# Patient Record
Sex: FEMALE | Race: BLACK OR AFRICAN AMERICAN | NOT HISPANIC OR LATINO | Employment: OTHER | URBAN - METROPOLITAN AREA
[De-identification: names, ages, dates, MRNs, and addresses within clinical notes are randomized per-mention and may not be internally consistent; named-entity substitution may affect disease eponyms.]

---

## 2021-06-21 ENCOUNTER — OFFICE VISIT (OUTPATIENT)
Dept: INTERNAL MEDICINE | Facility: CLINIC | Age: 71
End: 2021-06-21
Payer: COMMERCIAL

## 2021-06-21 VITALS
HEIGHT: 67 IN | BODY MASS INDEX: 23.39 KG/M2 | RESPIRATION RATE: 24 BRPM | DIASTOLIC BLOOD PRESSURE: 116 MMHG | HEART RATE: 80 BPM | OXYGEN SATURATION: 100 % | WEIGHT: 149 LBS | TEMPERATURE: 98.4 F | SYSTOLIC BLOOD PRESSURE: 197 MMHG

## 2021-06-21 DIAGNOSIS — K62.5 BRIGHT RED BLOOD PER RECTUM: ICD-10-CM

## 2021-06-21 DIAGNOSIS — H54.7 DECREASED VISION: ICD-10-CM

## 2021-06-21 DIAGNOSIS — Z12.31 ENCOUNTER FOR SCREENING MAMMOGRAM FOR MALIGNANT NEOPLASM OF BREAST: ICD-10-CM

## 2021-06-21 DIAGNOSIS — Z00.00 PREVENTATIVE HEALTH CARE: Primary | ICD-10-CM

## 2021-06-21 DIAGNOSIS — I10 ESSENTIAL HYPERTENSION: ICD-10-CM

## 2021-06-21 DIAGNOSIS — Z12.11 SPECIAL SCREENING FOR MALIGNANT NEOPLASMS, COLON: ICD-10-CM

## 2021-06-21 LAB
BASOPHILS # BLD AUTO: 0 10E9/L (ref 0–0.2)
BASOPHILS NFR BLD AUTO: 0.3 %
DIFFERENTIAL METHOD BLD: ABNORMAL
EOSINOPHIL # BLD AUTO: 0.1 10E9/L (ref 0–0.7)
EOSINOPHIL NFR BLD AUTO: 1.3 %
ERYTHROCYTE [DISTWIDTH] IN BLOOD BY AUTOMATED COUNT: 16 % (ref 10–15)
HCT VFR BLD AUTO: 38.4 % (ref 35–47)
HGB BLD-MCNC: 12.1 G/DL (ref 11.7–15.7)
LYMPHOCYTES # BLD AUTO: 2.7 10E9/L (ref 0.8–5.3)
LYMPHOCYTES NFR BLD AUTO: 34.4 %
MCH RBC QN AUTO: 26.4 PG (ref 26.5–33)
MCHC RBC AUTO-ENTMCNC: 31.5 G/DL (ref 31.5–36.5)
MCV RBC AUTO: 84 FL (ref 78–100)
MONOCYTES # BLD AUTO: 0.7 10E9/L (ref 0–1.3)
MONOCYTES NFR BLD AUTO: 9.4 %
NEUTROPHILS # BLD AUTO: 4.2 10E9/L (ref 1.6–8.3)
NEUTROPHILS NFR BLD AUTO: 54.6 %
PLATELET # BLD AUTO: 243 10E9/L (ref 150–450)
RBC # BLD AUTO: 4.59 10E12/L (ref 3.8–5.2)
WBC # BLD AUTO: 7.7 10E9/L (ref 4–11)

## 2021-06-21 PROCEDURE — 84443 ASSAY THYROID STIM HORMONE: CPT | Performed by: INTERNAL MEDICINE

## 2021-06-21 PROCEDURE — 80053 COMPREHEN METABOLIC PANEL: CPT | Performed by: INTERNAL MEDICINE

## 2021-06-21 PROCEDURE — 85025 COMPLETE CBC W/AUTO DIFF WBC: CPT | Performed by: INTERNAL MEDICINE

## 2021-06-21 PROCEDURE — 36415 COLL VENOUS BLD VENIPUNCTURE: CPT | Performed by: INTERNAL MEDICINE

## 2021-06-21 PROCEDURE — G0438 PPPS, INITIAL VISIT: HCPCS | Performed by: INTERNAL MEDICINE

## 2021-06-21 PROCEDURE — 80061 LIPID PANEL: CPT | Performed by: INTERNAL MEDICINE

## 2021-06-21 RX ORDER — LISINOPRIL 10 MG/1
10 TABLET ORAL DAILY
Qty: 90 TABLET | Refills: 1 | Status: ON HOLD | OUTPATIENT
Start: 2021-06-21 | End: 2022-01-19

## 2021-06-21 SDOH — HEALTH STABILITY: MENTAL HEALTH: HOW OFTEN DO YOU HAVE A DRINK CONTAINING ALCOHOL?: NOT ASKED

## 2021-06-21 SDOH — HEALTH STABILITY: MENTAL HEALTH: HOW OFTEN DO YOU HAVE 6 OR MORE DRINKS ON ONE OCCASION?: NOT ASKED

## 2021-06-21 SDOH — HEALTH STABILITY: MENTAL HEALTH: HOW MANY STANDARD DRINKS CONTAINING ALCOHOL DO YOU HAVE ON A TYPICAL DAY?: NOT ASKED

## 2021-06-21 ASSESSMENT — ENCOUNTER SYMPTOMS
CONSTIPATION: 0
PALPITATIONS: 0
SORE THROAT: 0
NERVOUS/ANXIOUS: 0
DIZZINESS: 0
HEMATURIA: 0
JOINT SWELLING: 0
FEVER: 0
HEADACHES: 0
DIARRHEA: 0
SHORTNESS OF BREATH: 0
PARESTHESIAS: 1
NAUSEA: 0
MYALGIAS: 0
HEARTBURN: 0
DYSURIA: 0
EYE PAIN: 0
BREAST MASS: 0
WEAKNESS: 0
FREQUENCY: 0
HEMATOCHEZIA: 1
ARTHRALGIAS: 0
CHILLS: 0
ABDOMINAL PAIN: 0
COUGH: 0

## 2021-06-21 ASSESSMENT — ACTIVITIES OF DAILY LIVING (ADL): CURRENT_FUNCTION: TRANSPORTATION REQUIRES ASSISTANCE

## 2021-06-21 ASSESSMENT — MIFFLIN-ST. JEOR: SCORE: 1210.55

## 2021-06-21 NOTE — LETTER
June 22, 2021      Rolanda Molina  [id number]   [facility] [address]        Dear ,    We are writing to inform you of your test results.    Labs are within acceptable limits.    Resulted Orders   CBC with platelets and differential   Result Value Ref Range    WBC 7.7 4.0 - 11.0 10e9/L    RBC Count 4.59 3.8 - 5.2 10e12/L    Hemoglobin 12.1 11.7 - 15.7 g/dL    Hematocrit 38.4 35.0 - 47.0 %    MCV 84 78 - 100 fl    MCH 26.4 (L) 26.5 - 33.0 pg    MCHC 31.5 31.5 - 36.5 g/dL    RDW 16.0 (H) 10.0 - 15.0 %    Platelet Count 243 150 - 450 10e9/L    % Neutrophils 54.6 %    % Lymphocytes 34.4 %    % Monocytes 9.4 %    % Eosinophils 1.3 %    % Basophils 0.3 %    Absolute Neutrophil 4.2 1.6 - 8.3 10e9/L    Absolute Lymphocytes 2.7 0.8 - 5.3 10e9/L    Absolute Monocytes 0.7 0.0 - 1.3 10e9/L    Absolute Eosinophils 0.1 0.0 - 0.7 10e9/L    Absolute Basophils 0.0 0.0 - 0.2 10e9/L    Diff Method Automated Method    TSH with free T4 reflex   Result Value Ref Range    TSH 3.98 0.40 - 4.00 mU/L   Comprehensive metabolic panel (BMP + Alb, Alk Phos, ALT, AST, Total. Bili, TP)   Result Value Ref Range    Sodium 141 133 - 144 mmol/L    Potassium 3.5 3.4 - 5.3 mmol/L    Chloride 109 94 - 109 mmol/L    Carbon Dioxide 24 20 - 32 mmol/L    Anion Gap 8 3 - 14 mmol/L    Glucose 97 70 - 99 mg/dL      Comment:      Fasting specimen    Urea Nitrogen 12 7 - 30 mg/dL    Creatinine 0.94 0.52 - 1.04 mg/dL    GFR Estimate 60 (L) >60 mL/min/[1.73_m2]      Comment:      Non  GFR Calc  Starting 12/18/2018, serum creatinine based estimated GFR (eGFR) will be   calculated using the Chronic Kidney Disease Epidemiology Collaboration   (CKD-EPI) equation.      GFR Estimate If Black 69 >60 mL/min/[1.73_m2]      Comment:       GFR Calc  Starting 12/18/2018, serum creatinine based estimated GFR (eGFR) will be   calculated using the Chronic Kidney Disease Epidemiology Collaboration   (CKD-EPI) equation.       Calcium 9.0 8.5 - 10.1 mg/dL    Bilirubin Total 0.4 0.2 - 1.3 mg/dL    Albumin 3.8 3.4 - 5.0 g/dL    Protein Total 7.8 6.8 - 8.8 g/dL    Alkaline Phosphatase 99 40 - 150 U/L    ALT 18 0 - 50 U/L    AST 19 0 - 45 U/L   Lipid Profile (Chol, Trig, HDL, LDL calc)   Result Value Ref Range    Cholesterol 199 <200 mg/dL    Triglycerides 157 (H) <150 mg/dL      Comment:      Borderline high:  150-199 mg/dl  High:             200-499 mg/dl  Very high:       >499 mg/dl  Fasting specimen      HDL Cholesterol 71 >49 mg/dL    LDL Cholesterol Calculated 97 <100 mg/dL      Comment:      Desirable:       <100 mg/dl    Non HDL Cholesterol 128 <130 mg/dL       If you have any questions or concerns, please call the clinic at the number listed above.       Sincerely,      Ira Leong MD

## 2021-06-21 NOTE — PROGRESS NOTES
"SUBJECTIVE:   Rolanda Molina is a 70 year old female who presents for Preventive Visit.    Establish care. Fasting.    Patient has been advised of split billing requirements and indicates understanding: Yes   Are you in the first 12 months of your Medicare coverage?  No    Healthy Habits:     In general, how would you rate your overall health?  Good    Frequency of exercise:  None    Do you usually eat at least 4 servings of fruit and vegetables a day, include whole grains    & fiber and avoid regularly eating high fat or \"junk\" foods?  Yes    Taking medications regularly:  Not Applicable    Medication side effects:  Not applicable    Ability to successfully perform activities of daily living:  Transportation requires assistance    Home Safety:  No safety concerns identified    Hearing Impairment:  Difficulty following a conversation in a noisy restaurant or crowded room, feel that people are mumbling or not speaking clearly and need to ask people to speak up or repeat themselves    In the past 6 months, have you been bothered by leaking of urine? Yes    In general, how would you rate your overall mental or emotional health?  Good      PHQ-2 Total Score: 0    Additional concerns today:  No    Do you feel safe in your environment? Yes    Have you ever done Advance Care Planning? (For example, a Health Directive, POLST, or a discussion with a medical provider or your loved ones about your wishes): No, advance care planning information given to patient to review.  Patient declined advance care planning discussion at this time.       Fall risk  Fallen 2 or more times in the past year?: No  Any fall with injury in the past year?: No    Cognitive Screening   1) Repeat 3 items (Leader, Season, Table)    2) Clock draw: NORMAL  3) 3 item recall: Recalls 1 object   Results: NORMAL clock, 1-2 items recalled: COGNITIVE IMPAIRMENT LESS LIKELY    Mini-CogTM Copyright [employer]OW[employer]. Licensed by the author for use in [facility] " Services; reprinted with permission (soob@.[facility]). All rights reserved.      Do you have sleep apnea, excessive snoring or daytime drowsiness?: no    Reviewed and updated as needed this visit by clinical staff                 Reviewed and updated as needed this visit by Provider                Social History     Tobacco Use     Smoking status: Not on file   Substance Use Topics     Alcohol use: Not on file         No flowsheet data found.            Current providers sharing in care for this patient include:   Patient Care Team:  No Ref-Primary, Physician as PCP - General    The following health maintenance items are reviewed in Epic and correct as of today:  There are no preventive care reminders to display for this patient.  BP Readings from Last 3 Encounters:   06/21/21 (!) 197/116    Wt Readings from Last 3 Encounters:   06/21/21 67.6 kg (149 lb)                     Review of Systems   Constitutional: Negative for chills and fever.   HENT: Positive for hearing loss. Negative for congestion, ear pain and sore throat.    Eyes: Positive for visual disturbance. Negative for pain.   Respiratory: Negative for cough and shortness of breath.    Cardiovascular: Negative for chest pain, palpitations and peripheral edema.   Gastrointestinal: Positive for hematochezia. Negative for abdominal pain, constipation, diarrhea, heartburn and nausea.   Breasts:  Negative for tenderness, breast mass and discharge.   Genitourinary: Negative for dysuria, frequency, genital sores, hematuria, pelvic pain, urgency, vaginal bleeding and vaginal discharge.   Musculoskeletal: Negative for arthralgias, joint swelling and myalgias.   Skin: Negative for rash.   Neurological: Positive for paresthesias. Negative for dizziness, weakness and headaches.   Psychiatric/Behavioral: Negative for mood changes. The patient is not nervous/anxious.      Has been 15 years since she has seen an MD. Has never had a mammogram or colonoscopy. Has had a Tdap in  the last 20 years refuses any kind of vaccination today.     Has occasional bright red blood per rectum when she has a BM on the paper when she wipes.     OBJECTIVE:   There were no vitals taken for this visit. There is no height or weight on file to calculate BMI.  Physical Exam  GENERAL: healthy, alert and no distress  EYES: Eyes grossly normal to inspection, PERRL and conjunctivae and sclerae normal  NECK: no adenopathy, no asymmetry, masses, or scars and thyroid normal to palpation  RESP: lungs clear to auscultation - no rales, rhonchi or wheezes  CV: regular rate and rhythm, normal S1 S2, no S3 or S4, no murmur, click or rub, no peripheral edema and peripheral pulses strong  ABDOMEN: soft, nontender, no hepatosplenomegaly, no masses and bowel sounds normal  MS: no gross musculoskeletal defects noted, no edema  SKIN: no suspicious lesions or rashes  NEURO: Normal strength and tone, mentation intact and speech normal  PSYCH: mentation appears normal, affect normal/bright      ASSESSMENT / PLAN:   1. Preventative health care     - *MA Screening Digital Bilateral; Future    2. Essential hypertension  Will start Lisinopril and get screening labs  - lisinopril (ZESTRIL) 10 MG tablet; Take 1 tablet (10 mg) by mouth daily  Dispense: 90 tablet; Refill: 1  - CBC with platelets and differential  - TSH with free T4 reflex  - Comprehensive metabolic panel (BMP + Alb, Alk Phos, ALT, AST, Total. Bili, TP)  - Lipid Profile (Chol, Trig, HDL, LDL calc)    3. Bright red blood per rectum  Unlikely to be anything major but she did agree to colonoscopy     4. Special screening for malignant neoplasms, colon     - GASTROENTEROLOGY ADULT REF PROCEDURE ONLY; Future    5. Decreased vision  Will refer to   - EYE ADULT REFERRAL; Future  - EYE ADULT REFERRAL; Future    6. Encounter for screening mammogram for malignant neoplasm of breast   Due for  - *MA Screening Digital Bilateral; Future    Patient has been advised of split billing  requirements and indicates understanding: Yes  COUNSELING:  Reviewed preventive health counseling, as reflected in patient instructions       Regular exercise       Healthy diet/nutrition    There is no height or weight on file to calculate BMI.        She has no history on file for tobacco.      Appropriate preventive services were discussed with this patient, including applicable screening as appropriate for cardiovascular disease, diabetes, osteopenia/osteoporosis, and glaucoma.  As appropriate for age/gender, discussed screening for colorectal cancer, prostate cancer, breast cancer, and cervical cancer. Checklist reviewing preventive services available has been given to the patient.    Reviewed patients plan of care and provided an AVS. The Basic Care Plan (routine screening as documented in Health Maintenance) for Rolanda meets the Care Plan requirement. This Care Plan has been established and reviewed with the Patient.    Counseling Resources:  ATP IV Guidelines  Pooled Cohorts Equation Calculator  Breast Cancer Risk Calculator  Breast Cancer: Medication to Reduce Risk  FRAX Risk Assessment  ICSI Preventive Guidelines  Dietary Guidelines for Americans, 2010  USDA's MyPlate  ASA Prophylaxis  Lung CA Screening    Ira Leong MD  [facility]    Identified Health Risks:

## 2021-06-22 LAB
ALBUMIN SERPL-MCNC: 3.8 G/DL (ref 3.4–5)
ALP SERPL-CCNC: 99 U/L (ref 40–150)
ALT SERPL W P-5'-P-CCNC: 18 U/L (ref 0–50)
ANION GAP SERPL CALCULATED.3IONS-SCNC: 8 MMOL/L (ref 3–14)
AST SERPL W P-5'-P-CCNC: 19 U/L (ref 0–45)
BILIRUB SERPL-MCNC: 0.4 MG/DL (ref 0.2–1.3)
BUN SERPL-MCNC: 12 MG/DL (ref 7–30)
CALCIUM SERPL-MCNC: 9 MG/DL (ref 8.5–10.1)
CHLORIDE SERPL-SCNC: 109 MMOL/L (ref 94–109)
CHOLEST SERPL-MCNC: 199 MG/DL
CO2 SERPL-SCNC: 24 MMOL/L (ref 20–32)
CREAT SERPL-MCNC: 0.94 MG/DL (ref 0.52–1.04)
GFR SERPL CREATININE-BSD FRML MDRD: 60 ML/MIN/{1.73_M2}
GLUCOSE SERPL-MCNC: 97 MG/DL (ref 70–99)
HDLC SERPL-MCNC: 71 MG/DL
LDLC SERPL CALC-MCNC: 97 MG/DL
NONHDLC SERPL-MCNC: 128 MG/DL
POTASSIUM SERPL-SCNC: 3.5 MMOL/L (ref 3.4–5.3)
PROT SERPL-MCNC: 7.8 G/DL (ref 6.8–8.8)
SODIUM SERPL-SCNC: 141 MMOL/L (ref 133–144)
TRIGL SERPL-MCNC: 157 MG/DL
TSH SERPL DL<=0.005 MIU/L-ACNC: 3.98 MU/L (ref 0.4–4)

## 2021-06-23 DIAGNOSIS — Z11.59 ENCOUNTER FOR SCREENING FOR OTHER VIRAL DISEASES: ICD-10-CM

## 2021-06-25 ENCOUNTER — HOSPITAL ENCOUNTER (OUTPATIENT)
Dept: MAMMOGRAPHY | Facility: CLINIC | Age: 71
Discharge: HOME | End: 2021-06-25
Attending: INTERNAL MEDICINE | Admitting: INTERNAL MEDICINE
Payer: COMMERCIAL

## 2021-06-25 DIAGNOSIS — Z00.00 PREVENTATIVE HEALTH CARE: ICD-10-CM

## 2021-06-25 DIAGNOSIS — Z12.31 ENCOUNTER FOR SCREENING MAMMOGRAM FOR MALIGNANT NEOPLASM OF BREAST: ICD-10-CM

## 2021-06-25 PROCEDURE — 77063 BREAST TOMOSYNTHESIS BI: CPT

## 2021-07-17 DIAGNOSIS — Z11.59 ENCOUNTER FOR SCREENING FOR OTHER VIRAL DISEASES: ICD-10-CM

## 2021-08-27 ENCOUNTER — LAB (OUTPATIENT)
Dept: LAB | Facility: CLINIC | Age: 71
End: 2021-08-27
Attending: INTERNAL MEDICINE
Payer: COMMERCIAL

## 2021-08-27 DIAGNOSIS — Z11.59 ENCOUNTER FOR SCREENING FOR OTHER VIRAL DISEASES: ICD-10-CM

## 2021-08-27 PROCEDURE — U0003 INFECTIOUS AGENT DETECTION BY NUCLEIC ACID (DNA OR RNA); SEVERE ACUTE RESPIRATORY SYNDROME CORONAVIRUS 2 (SARS-COV-2) (CORONAVIRUS DISEASE [COVID-19]), AMPLIFIED PROBE TECHNIQUE, MAKING USE OF HIGH THROUGHPUT TECHNOLOGIES AS DESCRIBED BY CMS-2020-01-R: HCPCS

## 2021-08-28 LAB — SARS-COV-2 RNA RESP QL NAA+PROBE: NEGATIVE

## 2021-08-30 ENCOUNTER — HOSPITAL ENCOUNTER (OUTPATIENT)
Facility: CLINIC | Age: 71
Discharge: HOME | End: 2021-08-30
Attending: INTERNAL MEDICINE | Admitting: INTERNAL MEDICINE
Payer: COMMERCIAL

## 2021-08-30 VITALS
TEMPERATURE: 97.3 F | OXYGEN SATURATION: 98 % | DIASTOLIC BLOOD PRESSURE: 104 MMHG | BODY MASS INDEX: 23.39 KG/M2 | RESPIRATION RATE: 16 BRPM | HEART RATE: 86 BPM | HEIGHT: 67 IN | WEIGHT: 149 LBS | SYSTOLIC BLOOD PRESSURE: 153 MMHG

## 2021-08-30 LAB — COLONOSCOPY: NORMAL

## 2021-08-30 PROCEDURE — 250N000011 HC RX IP 250 OP 636: Performed by: INTERNAL MEDICINE

## 2021-08-30 PROCEDURE — 88305 TISSUE EXAM BY PATHOLOGIST: CPT | Mod: TC | Performed by: INTERNAL MEDICINE

## 2021-08-30 PROCEDURE — 45382 COLONOSCOPY W/CONTROL BLEED: CPT | Performed by: INTERNAL MEDICINE

## 2021-08-30 PROCEDURE — 45385 COLONOSCOPY W/LESION REMOVAL: CPT | Performed by: INTERNAL MEDICINE

## 2021-08-30 PROCEDURE — 99153 MOD SED SAME PHYS/QHP EA: CPT | Performed by: INTERNAL MEDICINE

## 2021-08-30 PROCEDURE — 258N000003 HC RX IP 258 OP 636: Performed by: INTERNAL MEDICINE

## 2021-08-30 PROCEDURE — G0500 MOD SEDAT ENDO SERVICE >5YRS: HCPCS | Performed by: INTERNAL MEDICINE

## 2021-08-30 PROCEDURE — 45380 COLONOSCOPY AND BIOPSY: CPT | Mod: PT,XU | Performed by: INTERNAL MEDICINE

## 2021-08-30 RX ORDER — LIDOCAINE 40 MG/G
CREAM TOPICAL
Status: DISCONTINUED | OUTPATIENT
Start: 2021-08-30 | End: 2021-08-30 | Stop reason: HOSPADM

## 2021-08-30 RX ORDER — ONDANSETRON 2 MG/ML
4 INJECTION INTRAMUSCULAR; INTRAVENOUS
Status: DISCONTINUED | OUTPATIENT
Start: 2021-08-30 | End: 2021-08-30 | Stop reason: HOSPADM

## 2021-08-30 RX ORDER — FENTANYL CITRATE 50 UG/ML
25 INJECTION, SOLUTION INTRAMUSCULAR; INTRAVENOUS
Status: DISCONTINUED | OUTPATIENT
Start: 2021-08-30 | End: 2021-08-30 | Stop reason: HOSPADM

## 2021-08-30 RX ORDER — ONDANSETRON 4 MG/1
4 TABLET, ORALLY DISINTEGRATING ORAL EVERY 6 HOURS PRN
Status: CANCELLED | OUTPATIENT
Start: 2021-08-30

## 2021-08-30 RX ORDER — PROCHLORPERAZINE MALEATE 5 MG
5 TABLET ORAL EVERY 6 HOURS PRN
Status: CANCELLED | OUTPATIENT
Start: 2021-08-30

## 2021-08-30 RX ORDER — ONDANSETRON 2 MG/ML
4 INJECTION INTRAMUSCULAR; INTRAVENOUS EVERY 6 HOURS PRN
Status: CANCELLED | OUTPATIENT
Start: 2021-08-30

## 2021-08-30 RX ORDER — NALOXONE HYDROCHLORIDE 0.4 MG/ML
0.2 INJECTION, SOLUTION INTRAMUSCULAR; INTRAVENOUS; SUBCUTANEOUS
Status: CANCELLED | OUTPATIENT
Start: 2021-08-30

## 2021-08-30 RX ORDER — FENTANYL CITRATE 50 UG/ML
25-50 INJECTION, SOLUTION INTRAMUSCULAR; INTRAVENOUS
Status: COMPLETED | OUTPATIENT
Start: 2021-08-30 | End: 2021-08-30

## 2021-08-30 RX ORDER — FLUMAZENIL 0.1 MG/ML
0.2 INJECTION, SOLUTION INTRAVENOUS
Status: DISCONTINUED | OUTPATIENT
Start: 2021-08-30 | End: 2021-08-30 | Stop reason: HOSPADM

## 2021-08-30 RX ORDER — EPINEPHRINE 1 MG/ML
0.1 INJECTION, SOLUTION, CONCENTRATE INTRAVENOUS
Status: DISCONTINUED | OUTPATIENT
Start: 2021-08-30 | End: 2021-08-30 | Stop reason: HOSPADM

## 2021-08-30 RX ORDER — ATROPINE SULFATE 0.4 MG/ML
0.4 AMPUL (ML) INJECTION
Status: DISCONTINUED | OUTPATIENT
Start: 2021-08-30 | End: 2021-08-30 | Stop reason: HOSPADM

## 2021-08-30 RX ORDER — FLUMAZENIL 0.1 MG/ML
0.2 INJECTION, SOLUTION INTRAVENOUS
Status: CANCELLED | OUTPATIENT
Start: 2021-08-30 | End: 2021-08-30

## 2021-08-30 RX ORDER — NALOXONE HYDROCHLORIDE 0.4 MG/ML
0.4 INJECTION, SOLUTION INTRAMUSCULAR; INTRAVENOUS; SUBCUTANEOUS
Status: CANCELLED | OUTPATIENT
Start: 2021-08-30

## 2021-08-30 RX ORDER — SIMETHICONE 40MG/0.6ML
133 SUSPENSION, DROPS(FINAL DOSAGE FORM)(ML) ORAL
Status: DISCONTINUED | OUTPATIENT
Start: 2021-08-30 | End: 2021-08-30 | Stop reason: HOSPADM

## 2021-08-30 RX ADMIN — MIDAZOLAM 2 MG: 1 INJECTION INTRAMUSCULAR; INTRAVENOUS at 10:55

## 2021-08-30 RX ADMIN — MIDAZOLAM 2 MG: 1 INJECTION INTRAMUSCULAR; INTRAVENOUS at 11:18

## 2021-08-30 RX ADMIN — FENTANYL CITRATE 100 MCG: 50 INJECTION, SOLUTION INTRAMUSCULAR; INTRAVENOUS at 10:55

## 2021-08-30 RX ADMIN — SODIUM CHLORIDE 500 ML: 9 INJECTION, SOLUTION INTRAVENOUS at 10:54

## 2021-08-30 ASSESSMENT — MIFFLIN-ST. JEOR: SCORE: 1213.49

## 2021-08-30 NOTE — DISCHARGE INSTRUCTIONS
The patient has received a copy of the Provation  report the doctor has written and discharge instructions have been discussed with the patient and responsible adult.  All questions were addressed and answered prior to patient discharge.    Understanding Colon and Rectal Polyps     The colon has a smooth lining composed of millions of cells.     The colon (also called the large intestine) is a muscular tube that forms the last part of the digestive tract. It absorbs water and stores food waste. The colon is about 4 to 6 feet long. The rectum is the last 6 inches of the colon. The colon and rectum have a smooth lining composed of millions of cells. Changes in these cells can lead to growths in the colon that can become cancerous and should be removed.     When the Colon Lining Changes  Changes that occur in the cells that line the colon or rectum can lead to growths called polyps. Over a period of years, polyps can turn cancerous. Removing polyps early may prevent cancer from ever forming.      Polyps  Polyps are fleshy clumps of tissue that form on the lining of the colon or rectum. Small polyps are usually benign (not cancerous). However, over time, cells in a polyp can change and become cancerous. The larger a polyp grows, the more likely this is to happen. Also, certain types of polyps known as adenomatous polyps are considered premalignant. This means that they will almost always become cancerous if they re not removed.          Cancer  Almost all colorectal cancers start when polyp cells begin growing abnormally. As a cancerous tumor grows, it may involve more and more of the colon or rectum. In time, cancer can also grow beyond the colon or rectum and spread to nearby organs or to glands called lymph nodes. The cells can also travel to other parts of the body. This is known as metastasis. The earlier a cancerous tumor is removed, the better the chance of preventing its spread.        [id number] Malissa Gar,  [address] [place] [id number]. All rights reserved. This information is not intended as a substitute for professional medical care. Always follow your healthcare professional's instructions.    HIGH FIBER DIET  Fiber is present in all fruits, vegetables, cereals and grains. Fiber passes through the body undigested. A high fiber diet helps food move through the intestinal tract. The added bulk is helpful in preventing constipation. In people with diverticulosis it serves to clean out the pouches along the colon wall while preventing new ones from forming. A high fiber diet also reduces the risk of colon cancer, decreases blood cholesterol and prevents high blood sugar in people with diabetes.    The foods listed below are high in fiber and should be included in your diet. If you are not used to high fiber foods, start with 1 or 2 foods from this list. Every 3-4 days add a new one to your diet until you are eating 4 high fiber foods per day. This should give you 20-35 Gm of fiber/day. It is also important to drink a lot of water when you are on this diet (6-8 glasses a day). Water causes the fiber to swell and increases the benefit.    FOODS HIGH IN DIETARY FIBER:  BREADS: Made with 100% whole wheat flour; leora, wheat or rye crackers; tortillas, bran muffins  CEREALS: Whole grain cereal with bran (Chex, Raisin Bran, Corn Bran), oatmeal, rolled oats, granola, wheat flakes, brown rice  NUTS: Any nuts  FRUITS: All fresh fruits along with edible skins, (bananas, citrus fruit, mangoes, pears, prunes, raisins, apples, pineapple, apricot, melon, jams and marmalades), fruit juices (especially prune juice)  VEGETABLES: All types, preferably raw or lightly cooked: especially, celery, eggplant, potatoes, spinach, broccoli, brussel sprouts, winter squash, carrots, cauliflower, soybeans, lentils, fresh and dried beans of all kinds  OTHER: Popcorn, any spices      [id number] Malissa Gar, [address] [place] [address],  [employer] [id number]. All rights reserved. This information is not intended as a substitute for professional medical care. Always follow your healthcare professional's instructions.

## 2021-08-30 NOTE — PROVIDER NOTIFICATION
Dr. Vital notified, at pt bedside of elevated of BP.  Pt denies headache or any symptoms.  Okay to discharge home per  And take home BP medication.  Pt. And son updated to take BP medication, pt and son verbalizes understanding, states she usually takes it at noon.

## 2021-08-30 NOTE — LETTER
August 16, 2021      Rolanda Molina  [address]   [facility] [address]        Dear Rolanda,       Please be aware that coverage of these services is subject to the terms and limitations of your health insurance plan.  Call member services at your health plan with any benefit or coverage questions.    Thank you for choosing [facility]. You are scheduled for the following service(s):    Date:  Monday August 30, 2021             Procedure:  COLONOSCOPY  Doctor:        Federico Vital MD   Arrival Time:  10:10am *Enter and check in at the Main Hospital Entrance*  Procedure Time:  10:40am      Location:   [facility]        Endoscopy Department, First Floor         [address] [place], [place] [address] [phone] or [phone] () to reschedule        MIRALAX -GATORADE  PREP  Colonoscopy is the most accurate test to detect colon polyps and colon cancer; and the only test where polyps can be removed. During this procedure, a doctor examines the lining of your large intestine and rectum through a flexible tube.   Transportation  You must arrange for a ride for the day of your procedure with a responsible adult. A taxi , Uber, etc, is not an option unless you are accompanied by a responsible adult. If you fail to arrange transportation with a responsible adult, your procedure will be cancelled and rescheduled.    Purchase the  following supplies at your local pharmacy:  - 2 (two) bisacodyl tablets: each tablet contains 5 mg.  (Dulcolax  laxative NOT Dulcolax  stool softener)   - 1 (one) 8.3 oz bottle of Polyethylene Glycol (PEG) 3350 Powder   (MiraLAX , Smooth LAX , ClearLAX  or equivalent)  - 64 oz Gatorade    Regular Gatorade, Gatorade G2 , Powerade , Powerade Zero  or Pedialyte  is acceptable. Red colored flavors are not allowed; all other colors (yellow, green, orange, purple and blue) are okay. It is also okay to buy two 2.12 oz  packets of powdered Gatorade that can be mixed with water to a total volume of 64 oz of liquid.  - 1 (one) 10 oz bottle of Magnesium Citrate (Red colored flavors are not allowed)  It is also okay for you to use a 0.5 oz package of powdered magnesium citrate (17 g) mixed with 10 oz of water.      PREPARATION FOR COLONOSCOPY    7 days before:    Discontinue fiber supplements and medications containing iron. This includes Metamucil  and Fibercon ; and multivitamins with iron.    3 days before:    Begin a low-fiber diet. A low-fiber diet helps making the cleanout more effective.     Examples of a low-fiber diet include (but are not limited to): white bread, white rice, pasta, crackers, fish, chicken, eggs, ground beef, creamy peanut butter, cooked/steamed/boiled vegetables, canned fruit, bananas, melons, milk, plain yogurt cheese, salad dressing and other condiments.     The following are not allowed on a low-fiber diet: seeds, nuts, popcorn, bran, whole wheat, corn, quinoa, raw fruits and vegetables, berries and dried fruit, beans and lentils.    For additional details on low-fiber diet, please refer to the table on the last page.    2 days before:    Continue the low-fiber diet.     Drink at least 8 glasses of water throughout the day.     Stop eating solid foods at 11:45 pm.    1 day before:    In the morning: begin a clear liquid diet (liquids you can see through).     Examples of a clear liquid diet include: water, clear broth or bouillon, Gatorade, Pedialyte or Powerade, carbonated and non-carbonated soft drinks (Sprite , 7-Up , ginger ale), strained fruit juices without pulp (apple, white grape, white cranberry), Jell-O  and popsicles.     The following are not allowed on a clear liquid diet: red liquids, alcoholic beverages, dairy products (milk, creamer, and yogurt), protein shakes, creamy broths, juice with pulp and chewing tobacco.    At noon: take 2 (two) bisacodyl tablets     At 4 (and no later than 6pm):  start drinking the Miralax-Gatorade preparation (8.3 oz of Miralax mixed with 64 oz of Gatorade in a large pitcher). Drink 1(one) 8 oz glass every 15 minutes thereafter, until the mixture is gone.    COLON CLEANSING TIPS: drink adequate amounts of fluids before and after your colon cleansing to prevent dehydration. Stay near a toilet because you will have diarrhea. Even if you are sitting on the toilet, continue to drink the cleansing solution every 15 minutes. If you feel nauseous or vomit, rinse your mouth with water, take a 15 to 30-minute-break and then continue drinking the solution. You will be uncomfortable until the stool has flushed from your colon (in about 2 to 4 hours). You may feel chilled.    Day of your procedure  You may take all of your morning medications including blood pressure medications, blood thinners (if you have not been instructed to stop these by our office), methadone, anti-seizure medications with sips of water 3 hours prior to your procedure or earlier. Do not take insulin or vitamins prior to your procedure. Continue the clear liquid diet.       4 hours prior: drink 10 oz of magnesium citrate. It may be easier to drink it with a straw.    STOP consuming all liquids after that.     Do not take anything by mouth during this time.     Allow extra time to travel to your procedure as you may need to stop and use a restroom along the way.    You are ready for the procedure, if you followed all instructions and your stool is no longer formed, but clear or yellow liquid. If you are unsure whether your colon is clean, please call our office at [phone] before you leave for your appointment.    Bring the following to your procedure:  - Insurance Card/Photo ID.   - List of current medications including over-the-counter medications and supplements.   - Your rescue inhaler if you currently use one to control asthma.    Canceling or rescheduling your appointment:   If you must cancel or  reschedule your appointment, please call [phone] as soon as possible.      COLONOSCOPY PRE-PROCEDURE CHECKLIST    If you have diabetes, ask your regular doctor for diet and medication restrictions.  If you take an anticoagulant or anti-platelet medication (such as Coumadin , Lovenox , Pradaxa , Xarelto , Eliquis , etc.), please call your primary doctor for advice on holding this medication.  If you take aspirin you may continue to do so.  If you are or may be pregnant, please discuss the risks and benefits of this procedure with your doctor.        What happens during a colonoscopy?    Plan to spend up to two hours, starting at registration time, at the endoscopy center the day of your procedure. The colonoscopy takes an average of 15 to 30 minutes. Recovery time is about 30 minutes.      Before the exam:    You will change into a gown.    Your medical history and medication list will be reviewed with you, unless that has been done over the phone prior to the procedure.     A nurse will insert an intravenous (IV) line into your hand or arm.    The doctor will meet with you and will give you a consent form to sign.  During the exam:     Medicine will be given through the IV line to help you relax.     Your heart rate and oxygen levels will be monitored. If your blood pressure is low, you may be given fluids through the IV line.     The doctor will insert a flexible hollow tube, called a colonoscope, into your rectum. The scope will be advanced slowly through the large intestine (colon).    You may have a feeling of fullness or pressure.     If an abnormal tissue or a polyp is found, the doctor may remove it through the endoscope for closer examination, or biopsy. Tissue removal is painless    After the exam:           Any tissue samples removed during the exam will be sent to a lab for evaluation. It may take 5-7 working days for you to be notified of the results.     A nurse will provide you with complete  discharge instructions before you leave the endoscopy center. Be sure to ask the nurse for specific instructions if you take blood thinners such as Aspirin, Coumadin or Plavix.     The doctor will prepare a full report for you and for the physician who referred you for the procedure.     Your doctor will talk with you about the initial results of your exam.      Medication given during the exam will prohibit you from driving for the rest of the day.     Following the exam, you may resume your normal diet. Your first meal should be light, no greasy foods. Avoid alcohol until the next day.     You may resume your regular activities the day after the procedure.         LOW-FIBER DIET    Foods RECOMMENDED Foods to AVOID   Breads, Cereal, Rice and Pasta:   White bread, rolls, biscuits, croissant and tolu toast.   Waffles, Bulgarian toast and pancakes.   White rice, noodles, pasta, macaroni and peeled cooked potatoes.   Plain crackers and saltines.   Cooked cereals: farina, cream of rice.   Cold cereals: Puffed Rice , Rice Krispies , Corn Flakes  and Special K    Breads, Cereal, Rice and Pasta:   Breads or rolls with nuts, seeds or fruit.   Whole wheat, pumpernickel, rye breads and cornbread.   Potatoes with skin, brown or wild rice, and kasha (buckwheat).     Vegetables:   Tender cooked and canned vegetables without seeds: carrots, asparagus tips, green or wax beans, pumpkin, spinach, lima beans. Vegetables:   Raw or steamed vegetables.   Vegetables with seeds.   Sauerkraut.   Winter squash, peas, broccoli, Brussel sprouts, cabbage, onions, cauliflower, baked beans, peas and corn.   Fruits:   Strained fruit juice.   Canned fruit, except pineapple.   Ripe bananas and melon. Fruits:   Prunes and prune juice.   Raw fruits.   Dried fruits: figs, dates and raisins.   Milk/Dairy:   Milk: plain or flavored.   Yogurt, custard and ice cream.   Cheese and cottage cheese Milk/Dairy:     Meat and other proteins:   ground, well-cooked  tender beef, lamb, ham, veal, pork, fish, poultry and organ meats.   Eggs.   Peanut butter without nuts. Meat and other proteins:   Tough, fibrous meats with gristle.   Dry beans, peas and lentils.   Peanut butter with nuts.   Tofu.   Fats, Snack, Sweets, Condiments and Beverages:   Margarine, butter, oils, mayonnaise, sour cream and salad dressing, plain gravy.   Sugar, hard candy, clear jelly, honey and syrup.   Spices, cooked herbs, bouillon, broth and soups made with allowed vegetable, ketchup and mustard.   Coffee, tea and carbonated drinks.   Plain cakes, cookies and pretzels.   Gelatin, plain puddings, custard, ice cream, sherbet and popsicles. Fats, Snack, Sweets, Condiments and Beverages:   Nuts, seeds and coconut.   Jam, marmalade and preserves.   Pickles, olives, relish and horseradish.   All desserts containing nuts, seeds, dried fruit and coconut; or made from whole grains or bran.   Candy made with nuts or seeds.   Popcorn.         DIRECTIONS TO THE ENDOSCOPY DEPARTMENT    From the north ([facility])  Take [address] South, exit on [address] [address]. Get into the left hand thee, turn left (east), go one-half mile to [address] and turn left. Go north to the second stoplight, take a right on [address] and follow it to the Main Hospital entrance.    From the south ([facility])  Take 35N to the [address]E split and exit on [address] [address]. On [address] [address], turn left (west) to [address]. Turn right (north) on [address]. Go north to the second stoplight, take a right on [address] and follow it to the Main Hospital entrance.    From the east via 35E ([facility])  Take [address] south to [address] [address] exit. Turn right on [address] [address]. Go west to [address]. Turn right (north) on [address]. Go to the second stoplight, take a right on [address] to the Main Hospital entrance.    From the east via [address] ([facility])  [address] to [address]. Turn left (south) on [address] to [address], turn left (east) on [address] and follow it to the Main Hospital entrance.    From the west via [address] ([place], [place])  Take [address] east to [address]. Turn right (south) on [address] to [address], turn left (east) on [address] and follow it to the Main Hospital entrance.

## 2021-08-31 LAB
PATH REPORT.COMMENTS IMP SPEC: NORMAL
PATH REPORT.COMMENTS IMP SPEC: NORMAL
PATH REPORT.FINAL DX SPEC: NORMAL
PATH REPORT.GROSS SPEC: NORMAL
PATH REPORT.MICROSCOPIC SPEC OTHER STN: NORMAL
PATH REPORT.RELEVANT HX SPEC: NORMAL
PHOTO IMAGE: NORMAL

## 2021-08-31 PROCEDURE — 88305 TISSUE EXAM BY PATHOLOGIST: CPT | Mod: 26 | Performed by: PATHOLOGY

## 2022-01-17 ENCOUNTER — APPOINTMENT (OUTPATIENT)
Dept: GENERAL RADIOLOGY | Facility: CLINIC | Age: 72
End: 2022-01-17
Attending: EMERGENCY MEDICINE
Payer: COMMERCIAL

## 2022-01-17 ENCOUNTER — APPOINTMENT (OUTPATIENT)
Dept: CT IMAGING | Facility: CLINIC | Age: 72
End: 2022-01-17
Attending: EMERGENCY MEDICINE
Payer: COMMERCIAL

## 2022-01-17 ENCOUNTER — HOSPITAL ENCOUNTER (OUTPATIENT)
Facility: CLINIC | Age: 72
Setting detail: OBSERVATION
Discharge: HOME HEALTH | End: 2022-01-19
Attending: EMERGENCY MEDICINE | Admitting: INTERNAL MEDICINE
Payer: COMMERCIAL

## 2022-01-17 DIAGNOSIS — M62.81 GENERALIZED MUSCLE WEAKNESS: ICD-10-CM

## 2022-01-17 DIAGNOSIS — N17.9 AKI (ACUTE KIDNEY INJURY) (H): ICD-10-CM
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
## 2022-01-17 DIAGNOSIS — U07.1 INFECTION DUE TO 2019 NOVEL CORONAVIRUS: ICD-10-CM

## 2022-01-17 DIAGNOSIS — R31.9 URINARY TRACT INFECTION WITH HEMATURIA, SITE UNSPECIFIED: ICD-10-CM

## 2022-01-17 DIAGNOSIS — N39.0 URINARY TRACT INFECTION WITH HEMATURIA, SITE UNSPECIFIED: ICD-10-CM

## 2022-01-17 DIAGNOSIS — R74.01 TRANSAMINITIS: ICD-10-CM

## 2022-01-17 LAB
ALBUMIN SERPL-MCNC: 3.3 G/DL (ref 3.4–5)
ALBUMIN UR-MCNC: 70 MG/DL
ALP SERPL-CCNC: 107 U/L (ref 40–150)
ALT SERPL W P-5'-P-CCNC: 100 U/L (ref 0–50)
ANION GAP SERPL CALCULATED.3IONS-SCNC: 8 MMOL/L (ref 3–14)
APPEARANCE UR: ABNORMAL
AST SERPL W P-5'-P-CCNC: 194 U/L (ref 0–45)
BACTERIA #/AREA URNS HPF: ABNORMAL /HPF
BASOPHILS # BLD MANUAL: 0 10E3/UL (ref 0–0.2)
BASOPHILS NFR BLD MANUAL: 0 %
BILIRUB SERPL-MCNC: 0.8 MG/DL (ref 0.2–1.3)
BILIRUB UR QL STRIP: NEGATIVE
BUN SERPL-MCNC: 79 MG/DL (ref 7–30)
BURR CELLS BLD QL SMEAR: SLIGHT
CALCIUM SERPL-MCNC: 9.5 MG/DL (ref 8.5–10.1)
CHLORIDE BLD-SCNC: 107 MMOL/L (ref 94–109)
CO2 SERPL-SCNC: 20 MMOL/L (ref 20–32)
COLOR UR AUTO: YELLOW
CREAT SERPL-MCNC: 2.1 MG/DL (ref 0.52–1.04)
CRP SERPL-MCNC: 112 MG/L (ref 0–8)
D DIMER PPP FEU-MCNC: 1.99 UG/ML FEU (ref 0–0.5)
EOSINOPHIL # BLD MANUAL: 0 10E3/UL (ref 0–0.7)
EOSINOPHIL NFR BLD MANUAL: 0 %
ERYTHROCYTE [DISTWIDTH] IN BLOOD BY AUTOMATED COUNT: 18.3 % (ref 10–15)
ETHANOL SERPL-MCNC: <0.01 G/DL
FLUAV RNA SPEC QL NAA+PROBE: NEGATIVE
FLUBV RNA RESP QL NAA+PROBE: NEGATIVE
GFR SERPL CREATININE-BSD FRML MDRD: 24 ML/MIN/1.73M2
GLUCOSE BLD-MCNC: 128 MG/DL (ref 70–99)
GLUCOSE UR STRIP-MCNC: NEGATIVE MG/DL
HCT VFR BLD AUTO: 35.8 % (ref 35–47)
HGB BLD-MCNC: 12.8 G/DL (ref 11.7–15.7)
HGB UR QL STRIP: ABNORMAL
HOLD SPECIMEN: NORMAL
HYALINE CASTS: 1 /LPF
KETONES UR STRIP-MCNC: NEGATIVE MG/DL
LACTATE SERPL-SCNC: 1.8 MMOL/L (ref 0.7–2)
LEUKOCYTE ESTERASE UR QL STRIP: ABNORMAL
LYMPHOCYTES # BLD MANUAL: 1.9 10E3/UL (ref 0.8–5.3)
LYMPHOCYTES NFR BLD MANUAL: 26 %
MAGNESIUM SERPL-MCNC: 3.1 MG/DL (ref 1.6–2.3)
MCH RBC QN AUTO: 30.6 PG (ref 26.5–33)
MCHC RBC AUTO-ENTMCNC: 35.8 G/DL (ref 31.5–36.5)
MCV RBC AUTO: 86 FL (ref 78–100)
METAMYELOCYTES # BLD MANUAL: 0.1 10E3/UL
METAMYELOCYTES NFR BLD MANUAL: 2 %
MONOCYTES # BLD MANUAL: 0.7 10E3/UL (ref 0–1.3)
MONOCYTES NFR BLD MANUAL: 10 %
MUCOUS THREADS #/AREA URNS LPF: PRESENT /LPF
NEUTROPHILS # BLD MANUAL: 4.6 10E3/UL (ref 1.6–8.3)
NEUTROPHILS NFR BLD MANUAL: 62 %
NITRATE UR QL: NEGATIVE
PH UR STRIP: 5.5 [PH] (ref 5–7)
PLAT MORPH BLD: ABNORMAL
PLATELET # BLD AUTO: 319 10E3/UL (ref 150–450)
POTASSIUM BLD-SCNC: 3.9 MMOL/L (ref 3.4–5.3)
PROT SERPL-MCNC: 9.1 G/DL (ref 6.8–8.8)
RBC # BLD AUTO: 4.18 10E6/UL (ref 3.8–5.2)
RBC MORPH BLD: ABNORMAL
RBC URINE: 11 /HPF
SARS-COV-2 RNA RESP QL NAA+PROBE: POSITIVE
SODIUM SERPL-SCNC: 135 MMOL/L (ref 133–144)
SP GR UR STRIP: 1.02 (ref 1–1.03)
SQUAMOUS EPITHELIAL: 1 /HPF
T4 FREE SERPL-MCNC: 1.3 NG/DL (ref 0.76–1.46)
TROPONIN I SERPL HS-MCNC: 7 NG/L
TSH SERPL DL<=0.005 MIU/L-ACNC: 4.09 MU/L (ref 0.4–4)
UROBILINOGEN UR STRIP-MCNC: NORMAL MG/DL
WBC # BLD AUTO: 7.4 10E3/UL (ref 4–11)
WBC CLUMPS #/AREA URNS HPF: PRESENT /HPF
WBC URINE: 141 /HPF

## 2022-01-17 PROCEDURE — 84439 ASSAY OF FREE THYROXINE: CPT | Performed by: EMERGENCY MEDICINE

## 2022-01-17 PROCEDURE — 96361 HYDRATE IV INFUSION ADD-ON: CPT

## 2022-01-17 PROCEDURE — 250N000011 HC RX IP 250 OP 636: Performed by: INTERNAL MEDICINE

## 2022-01-17 PROCEDURE — 87636 SARSCOV2 & INF A&B AMP PRB: CPT | Performed by: EMERGENCY MEDICINE

## 2022-01-17 PROCEDURE — 258N000003 HC RX IP 258 OP 636: Performed by: INTERNAL MEDICINE

## 2022-01-17 PROCEDURE — 93005 ELECTROCARDIOGRAM TRACING: CPT

## 2022-01-17 PROCEDURE — 81001 URINALYSIS AUTO W/SCOPE: CPT | Performed by: EMERGENCY MEDICINE

## 2022-01-17 PROCEDURE — 83605 ASSAY OF LACTIC ACID: CPT | Performed by: EMERGENCY MEDICINE

## 2022-01-17 PROCEDURE — C9803 HOPD COVID-19 SPEC COLLECT: HCPCS

## 2022-01-17 PROCEDURE — 82077 ASSAY SPEC XCP UR&BREATH IA: CPT | Performed by: EMERGENCY MEDICINE

## 2022-01-17 PROCEDURE — 84443 ASSAY THYROID STIM HORMONE: CPT | Performed by: EMERGENCY MEDICINE

## 2022-01-17 PROCEDURE — 85027 COMPLETE CBC AUTOMATED: CPT | Performed by: EMERGENCY MEDICINE

## 2022-01-17 PROCEDURE — 80053 COMPREHEN METABOLIC PANEL: CPT | Performed by: EMERGENCY MEDICINE

## 2022-01-17 PROCEDURE — 83735 ASSAY OF MAGNESIUM: CPT | Performed by: EMERGENCY MEDICINE

## 2022-01-17 PROCEDURE — 84484 ASSAY OF TROPONIN QUANT: CPT | Performed by: EMERGENCY MEDICINE

## 2022-01-17 PROCEDURE — 70450 CT HEAD/BRAIN W/O DYE: CPT

## 2022-01-17 PROCEDURE — 86140 C-REACTIVE PROTEIN: CPT | Performed by: INTERNAL MEDICINE

## 2022-01-17 PROCEDURE — 250N000011 HC RX IP 250 OP 636: Performed by: EMERGENCY MEDICINE

## 2022-01-17 PROCEDURE — 258N000003 HC RX IP 258 OP 636: Performed by: EMERGENCY MEDICINE

## 2022-01-17 PROCEDURE — 71045 X-RAY EXAM CHEST 1 VIEW: CPT

## 2022-01-17 PROCEDURE — 99220 PR INITIAL OBSERVATION CARE,LEVEL III: CPT | Performed by: INTERNAL MEDICINE

## 2022-01-17 PROCEDURE — 87086 URINE CULTURE/COLONY COUNT: CPT | Performed by: EMERGENCY MEDICINE

## 2022-01-17 PROCEDURE — 85379 FIBRIN DEGRADATION QUANT: CPT | Performed by: INTERNAL MEDICINE

## 2022-01-17 PROCEDURE — 36415 COLL VENOUS BLD VENIPUNCTURE: CPT | Performed by: INTERNAL MEDICINE

## 2022-01-17 PROCEDURE — 36415 COLL VENOUS BLD VENIPUNCTURE: CPT | Performed by: EMERGENCY MEDICINE

## 2022-01-17 PROCEDURE — 96365 THER/PROPH/DIAG IV INF INIT: CPT

## 2022-01-17 PROCEDURE — 96372 THER/PROPH/DIAG INJ SC/IM: CPT | Performed by: INTERNAL MEDICINE

## 2022-01-17 PROCEDURE — 99285 EMERGENCY DEPT VISIT HI MDM: CPT | Mod: 25

## 2022-01-17 RX ORDER — ACETAMINOPHEN 325 MG/1
650 TABLET ORAL EVERY 6 HOURS PRN
Status: DISCONTINUED | OUTPATIENT
Start: 2022-01-17 | End: 2022-01-19 | Stop reason: HOSPADM

## 2022-01-17 RX ORDER — CEFTRIAXONE 1 G/1
1 INJECTION, POWDER, FOR SOLUTION INTRAMUSCULAR; INTRAVENOUS ONCE
Status: COMPLETED | OUTPATIENT
Start: 2022-01-17 | End: 2022-01-17

## 2022-01-17 RX ORDER — LANOLIN ALCOHOL/MO/W.PET/CERES
3 CREAM (GRAM) TOPICAL
Status: DISCONTINUED | OUTPATIENT
Start: 2022-01-17 | End: 2022-01-19 | Stop reason: HOSPADM

## 2022-01-17 RX ORDER — LIDOCAINE 40 MG/G
CREAM TOPICAL
Status: DISCONTINUED | OUTPATIENT
Start: 2022-01-17 | End: 2022-01-19 | Stop reason: HOSPADM

## 2022-01-17 RX ORDER — HEPARIN SODIUM 5000 [USP'U]/.5ML
5000 INJECTION, SOLUTION INTRAVENOUS; SUBCUTANEOUS EVERY 12 HOURS
Status: DISCONTINUED | OUTPATIENT
Start: 2022-01-17 | End: 2022-01-19 | Stop reason: HOSPADM

## 2022-01-17 RX ORDER — ONDANSETRON 2 MG/ML
4 INJECTION INTRAMUSCULAR; INTRAVENOUS EVERY 6 HOURS PRN
Status: DISCONTINUED | OUTPATIENT
Start: 2022-01-17 | End: 2022-01-19 | Stop reason: HOSPADM

## 2022-01-17 RX ORDER — ONDANSETRON 4 MG/1
4 TABLET, ORALLY DISINTEGRATING ORAL EVERY 6 HOURS PRN
Status: DISCONTINUED | OUTPATIENT
Start: 2022-01-17 | End: 2022-01-19 | Stop reason: HOSPADM

## 2022-01-17 RX ORDER — ACETAMINOPHEN 650 MG/1
650 SUPPOSITORY RECTAL EVERY 6 HOURS PRN
Status: DISCONTINUED | OUTPATIENT
Start: 2022-01-17 | End: 2022-01-19 | Stop reason: HOSPADM

## 2022-01-17 RX ORDER — SODIUM CHLORIDE 9 MG/ML
INJECTION, SOLUTION INTRAVENOUS CONTINUOUS
Status: DISCONTINUED | OUTPATIENT
Start: 2022-01-17 | End: 2022-01-17

## 2022-01-17 RX ORDER — CEFTRIAXONE 1 G/1
1 INJECTION, POWDER, FOR SOLUTION INTRAMUSCULAR; INTRAVENOUS EVERY 24 HOURS
Status: DISCONTINUED | OUTPATIENT
Start: 2022-01-18 | End: 2022-01-19

## 2022-01-17 RX ORDER — MAGNESIUM HYDROXIDE/ALUMINUM HYDROXICE/SIMETHICONE 120; 1200; 1200 MG/30ML; MG/30ML; MG/30ML
30 SUSPENSION ORAL EVERY 4 HOURS PRN
Status: DISCONTINUED | OUTPATIENT
Start: 2022-01-17 | End: 2022-01-19 | Stop reason: HOSPADM

## 2022-01-17 RX ADMIN — CEFTRIAXONE 1 G: 1 INJECTION, POWDER, FOR SOLUTION INTRAMUSCULAR; INTRAVENOUS at 21:56

## 2022-01-17 RX ADMIN — SODIUM CHLORIDE 1000 ML: 9 INJECTION, SOLUTION INTRAVENOUS at 19:22

## 2022-01-17 RX ADMIN — SODIUM CHLORIDE 500 ML: 9 INJECTION, SOLUTION INTRAVENOUS at 23:35

## 2022-01-17 RX ADMIN — HEPARIN SODIUM 5000 UNITS: 5000 INJECTION INTRAVENOUS; SUBCUTANEOUS at 23:36

## 2022-01-17 RX ADMIN — SODIUM CHLORIDE 1000 ML: 9 INJECTION, SOLUTION INTRAVENOUS at 20:08

## 2022-01-17 ASSESSMENT — ACTIVITIES OF DAILY LIVING (ADL)
ADLS_ACUITY_SCORE: 9

## 2022-01-17 ASSESSMENT — ENCOUNTER SYMPTOMS
VOMITING: 0
DIARRHEA: 1
DYSURIA: 0
FEVER: 0
HEMATURIA: 0
COUGH: 0
WEAKNESS: 1
DIZZINESS: 0
ABDOMINAL PAIN: 0
APPETITE CHANGE: 0

## 2022-01-17 NOTE — LETTER
*Please review asap, patient discharged last evening    Anabel Cobb RN, BSN CTS  Care Coordinator  [facility]   [phone]

## 2022-01-18 PROBLEM — R31.9 URINARY TRACT INFECTION WITH HEMATURIA, SITE UNSPECIFIED: Status: ACTIVE | Noted: 2022-01-18

## 2022-01-18 PROBLEM — N39.0 URINARY TRACT INFECTION WITH HEMATURIA, SITE UNSPECIFIED: Status: ACTIVE | Noted: 2022-01-18

## 2022-01-18 LAB
ANION GAP SERPL CALCULATED.3IONS-SCNC: 9 MMOL/L (ref 3–14)
ATRIAL RATE - MUSE: 87 BPM
BUN SERPL-MCNC: 57 MG/DL (ref 7–30)
CALCIUM SERPL-MCNC: 9.2 MG/DL (ref 8.5–10.1)
CHLORIDE BLD-SCNC: 112 MMOL/L (ref 94–109)
CO2 SERPL-SCNC: 19 MMOL/L (ref 20–32)
CREAT SERPL-MCNC: 1.21 MG/DL (ref 0.52–1.04)
CRP SERPL-MCNC: 103 MG/L (ref 0–8)
DIASTOLIC BLOOD PRESSURE - MUSE: NORMAL MMHG
ERYTHROCYTE [DISTWIDTH] IN BLOOD BY AUTOMATED COUNT: 16.7 % (ref 10–15)
GFR SERPL CREATININE-BSD FRML MDRD: 47 ML/MIN/1.73M2
GLUCOSE BLD-MCNC: 102 MG/DL (ref 70–99)
HCT VFR BLD AUTO: 37.4 % (ref 35–47)
HGB BLD-MCNC: 12.4 G/DL (ref 11.7–15.7)
INTERPRETATION ECG - MUSE: NORMAL
MCH RBC QN AUTO: 27.7 PG (ref 26.5–33)
MCHC RBC AUTO-ENTMCNC: 33.2 G/DL (ref 31.5–36.5)
MCV RBC AUTO: 84 FL (ref 78–100)
P AXIS - MUSE: 46 DEGREES
PLATELET # BLD AUTO: 290 10E3/UL (ref 150–450)
POTASSIUM BLD-SCNC: 4 MMOL/L (ref 3.4–5.3)
PR INTERVAL - MUSE: 130 MS
QRS DURATION - MUSE: 72 MS
QT - MUSE: 362 MS
QTC - MUSE: 435 MS
R AXIS - MUSE: 16 DEGREES
RBC # BLD AUTO: 4.48 10E6/UL (ref 3.8–5.2)
SODIUM SERPL-SCNC: 140 MMOL/L (ref 133–144)
SYSTOLIC BLOOD PRESSURE - MUSE: NORMAL MMHG
T AXIS - MUSE: 46 DEGREES
VENTRICULAR RATE- MUSE: 87 BPM
WBC # BLD AUTO: 6.5 10E3/UL (ref 4–11)

## 2022-01-18 PROCEDURE — 99225 PR SUBSEQUENT OBSERVATION CARE,LEVEL II: CPT | Performed by: INTERNAL MEDICINE

## 2022-01-18 PROCEDURE — 99207 PR CDG-MDM COMPONENT: MEETS MODERATE - DOWN CODED: CPT | Performed by: INTERNAL MEDICINE

## 2022-01-18 PROCEDURE — 250N000011 HC RX IP 250 OP 636: Performed by: INTERNAL MEDICINE

## 2022-01-18 PROCEDURE — 85048 AUTOMATED LEUKOCYTE COUNT: CPT | Performed by: INTERNAL MEDICINE

## 2022-01-18 PROCEDURE — G0378 HOSPITAL OBSERVATION PER HR: HCPCS

## 2022-01-18 PROCEDURE — 96376 TX/PRO/DX INJ SAME DRUG ADON: CPT

## 2022-01-18 PROCEDURE — 96372 THER/PROPH/DIAG INJ SC/IM: CPT | Performed by: INTERNAL MEDICINE

## 2022-01-18 PROCEDURE — 82310 ASSAY OF CALCIUM: CPT | Performed by: INTERNAL MEDICINE

## 2022-01-18 PROCEDURE — 86140 C-REACTIVE PROTEIN: CPT | Performed by: INTERNAL MEDICINE

## 2022-01-18 PROCEDURE — 36415 COLL VENOUS BLD VENIPUNCTURE: CPT | Performed by: INTERNAL MEDICINE

## 2022-01-18 RX ADMIN — CEFTRIAXONE 1 G: 1 INJECTION, POWDER, FOR SOLUTION INTRAMUSCULAR; INTRAVENOUS at 22:22

## 2022-01-18 RX ADMIN — HEPARIN SODIUM 5000 UNITS: 5000 INJECTION INTRAVENOUS; SUBCUTANEOUS at 11:03

## 2022-01-18 ASSESSMENT — ACTIVITIES OF DAILY LIVING (ADL)
ADLS_ACUITY_SCORE: 11
ADLS_ACUITY_SCORE: 9
ADLS_ACUITY_SCORE: 15
ADLS_ACUITY_SCORE: 11
ADLS_ACUITY_SCORE: 15
ADLS_ACUITY_SCORE: 11

## 2022-01-18 ASSESSMENT — MIFFLIN-ST. JEOR: SCORE: 1026.6

## 2022-01-18 NOTE — ED NOTES
[facility]  ED Nurse Handoff Report    Rolanda Molina is a 71 year old female   ED Chief complaint: Generalized Weakness  . ED Diagnosis:   Final diagnoses:   None     Allergies:   Allergies   Allergen Reactions     Penicillins      Swelling of arms and legs       Code Status: Full Code  Activity level - Baseline/Home:  Independent. Activity Level - Current:   Assist X 1. Lift room needed: No. Bariatric: No   Needed: No   Isolation: Yes. Infection: Not Applicable  COVID r/o and special precautions.     Vital Signs:   Vitals:    01/17/22 1908 01/17/22 2000   BP: (!) 135/103 134/58   Pulse: 98 90   Resp:  16   Temp: 97.4  F (36.3  C)    TempSrc: Oral    SpO2: 99% 99%       Cardiac Rhythm:  ,      Pain level:    Patient confused: Yes. Patient Falls Risk: Yes.   Elimination Status: Has voided   Patient Report - Initial Complaint: Gen weakness, fall. Focused Assessment: AMS, covid, DOLORES   Tests Performed:   Labs Ordered and Resulted from Time of ED Arrival to Time of ED Departure   COMPREHENSIVE METABOLIC PANEL - Abnormal       Result Value    Sodium 135      Potassium 3.9      Chloride 107      Carbon Dioxide (CO2) 20      Anion Gap 8      Urea Nitrogen 79 (*)     Creatinine 2.10 (*)     Calcium 9.5      Glucose 128 (*)     Alkaline Phosphatase 107       (*)      (*)     Protein Total 9.1 (*)     Albumin 3.3 (*)     Bilirubin Total 0.8      GFR Estimate 24 (*)    MAGNESIUM - Abnormal    Magnesium 3.1 (*)    TSH WITH FREE T4 REFLEX - Abnormal    TSH 4.09 (*)    INFLUENZA A/B & SARS-COV2 PCR MULTIPLEX - Abnormal    Influenza A PCR Negative      Influenza B PCR Negative      SARS CoV2 PCR Positive (*)    CBC WITH PLATELETS AND DIFFERENTIAL - Abnormal    WBC Count 7.4      RBC Count 4.18      Hemoglobin 12.8      Hematocrit 35.8      MCV 86      MCH 30.6      MCHC 35.8      RDW 18.3 (*)     Platelet Count 319     DIFFERENTIAL - Abnormal    % Neutrophils 62      % Lymphocytes 26       % Monocytes 10      % Eosinophils 0      % Basophils 0      % Metamyelocytes 2      Absolute Neutrophils 4.6      Absolute Lymphocytes 1.9      Absolute Monocytes 0.7      Absolute Eosinophils 0.0      Absolute Basophils 0.0      Absolute Metamyelocytes 0.1 (*)     RBC Morphology Confirmed RBC Indices      Platelet Assessment        Value: Automated Count Confirmed. Platelet morphology is normal.    [place] Cells Slight (*)    LACTIC ACID WHOLE BLOOD - Normal    Lactic Acid 1.8     TROPONIN I - Normal    Troponin I High Sensitivity 7     ETHYL ALCOHOL LEVEL - Normal    Alcohol ethyl <0.01     T4 FREE - Normal    Free T4 1.30     ROUTINE UA WITH MICROSCOPIC REFLEX TO CULTURE     XR Chest Port 1 View    (Results Pending)   Head CT w/o contrast    (Results Pending)     . Abnormal Results: See above.   Treatments provided: IVF  Family Comments: NA  OBS brochure/video discussed/provided to patient:  No  ED Medications:   Medications   0.9% sodium chloride BOLUS (1,000 mLs Intravenous New Bag 1/17/22 1922)     Followed by   sodium chloride 0.9% infusion (has no administration in time range)   0.9% sodium chloride BOLUS (1,000 mLs Intravenous New Bag 1/17/22 2008)     Drips infusing:  Yes  For the majority of the shift, the patient's behavior Green. Interventions performed were NA.    Sepsis treatment initiated: No     Patient tested for COVID 19 prior to admission: YES    ED Nurse Name/Phone Number: Josephine Schwartz RN,   8:25 PM    RECEIVING UNIT ED HANDOFF REVIEW    Above ED Nurse Handoff Report was reviewed: Yes  Reviewed by: Veor Gr RN on January 18, 2022 at 11:02 AM

## 2022-01-18 NOTE — H&P
History and Physical     Rolanda Molina MRN# [id number]   YOB: 1950 Age: 71 year old      Date of Admission:  1/17/2022    Primary care provider: No Ref-Primary, Physician          Assessment and Plan:     Summary of Stay: Rolanda Molina is a 71 year old female with a history of htn admitted on 1/17/2022 with generalized weakness with DOLORES, UA consistent with UTI and she is positive for COVID without respiratory compromise    She is not vaccinated    She's been feeling poorly for the past week or a little longer.  Began with a headache and tactile fevers.  No cp/cough/sob.  No loss of taste/smell.  2-3 days ago she's developed lots of n/v/d without abdominal pain.  There's no hematemesis, stools are not bloody but they are very dark.  She's had poor po as well     She's gotten weaker and weaker and fell on the day of admission and couldn't get up, her grandchild had to help her.  She did not pass out or hit her head.  She's not sure why she fell     She denies any dysuria/urgency/frequency to me    In the ER VS notable for mild hypertension,l she's afebrile, sats  % but slightly tachyepneic in the mid 20's.  BMP with DOLORES 79/2.1, moderately elevated LFTs ast/alt 194/100.  Trop wnl 4. UA is quite inflammatory       Problem List:   DOLORES  Looks pre-renal based on bun/creat ratio.  ACE I may have played a role as well.   -stop lisinopril  -give total of 1 L NS  -recheck bmp in am   -follow I/O    COVID  She has no cough/sob/hypoxia.  She is having n/v/d which might be her manifestation of the illness She denies knowing of any exposure,  She is not vaccinated but wants to be now. She does not meet criteria for dex/remdesivir, CXR is not impressive but per rads read is consistent with COVID  -monitor for symptoms, currently doesn't seem to have many although weakness might be a part of it.  CRP and D-dimer are high 112/2.0 respectively but without any respiratory sx or hypoxia do not feel CT scan  warranted  -judicious IVF or risk unmasking respiratory failure    UTI  She is asx but high risk given recent diarrhea.  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 She denies significant drinking to me and etoh     Hypertension pta regimen lisinopril 10 mg every day  -hold for now in light of DOLORES    DVT Prophylaxis: Pneumatic Compression Devices  Code Status: Full Code  Functional Status: lives alone in an apt, independent in ADLs  Ornelas: will monitor for retention with bladder scan  Access:  PIV          We are operating under sub optimal conditions in the setting of a world wide pandemic, hospitals are running at full capacity with limited ICU bed availability. We are providing the best possible patient care with limited resources.           Chief Complaint:     Generalized weakness and a fall        History of Present Illness:   : Rolanda Molina is a 71 year old female with a history of htn admitted on 1/17/2022 with generalized weakness with DOLORES, UA consistent with UTI and she is positive for COVID without respiratory compromise    She is not vaccinated    She's been feeling poorly for the past week or a little longer.  Began with a headache and tactile fevers.  No cp/cough/sob.  No loss of taste/smell.  2-3 days ago she's developed lots of n/v/d without abdominal pain.  There's no hematemesis, stools are not bloody but they are very dark.  She's had poor po as well     She's gotten weaker and weaker and fell on the day of admission and couldn't get up, her grandchild had to help her.  She did not pass out or hit her head.  She's not sure why she fell     She denies any dysuria/urgency/frequency to me    In the ER  VS notable for mild hypertension,l she's afebrile, sats  % but slightly tachyepneic in the mid 20's.  BMP with DOLORES 79/2.1, moderately elevated LFTs ast/alt 194/100.  Trop wnl 4. UA is quite inflammatory     The history is obtained in discussion with the ER provider Dr Vergara and the patient with fair reliability     Epic and Care everywhere were extensively reviewed        Past Medical History:     Past Medical History:   Diagnosis Date     Hypertension              Past Surgical History:     Past Surgical History:   Procedure Laterality Date     COLONOSCOPY N/A 8/30/2021    Procedure: Colonoscopy, With Polypectomy And Biopsy;  Surgeon: Federico Vital MD;  Location:  GI             Social History:     Social History     Tobacco Use     Smoking status: Current Every Day Smoker     Smokeless tobacco: Never Used     Tobacco comment: starting smoking at age 26   Substance Use Topics     Alcohol use: Yes      Code Status: Full Code  Functional Status: lives alone in an apt, independent in ADLs       Family History:     Family History   Problem Relation Age of Onset     Diabetes Mother         Type II     Thyroid Cancer Mother      Colon Cancer No family hx of             Allergies:     Allergies   Allergen Reactions     Penicillins      Swelling of arms and legs             Medications:     Lisinopril           Review of Systems:     A Comprehensive greater than 10 system review of systems was carried out.  Pertinent positives and negatives are noted above.  Otherwise negative for contributory information.           Physical Exam:   Blood pressure (!) 146/104, pulse 85, temperature 97.4  F (36.3  C), temperature source Oral, resp. rate 25, SpO2 100 %.  Exam:    General:  Pleasant nad looks stated age  HEENT:  Head nc/at sclera clear PERRL O/P:  Mask in place  Neck is supple  Lungs: cta b nl effort   CV:  RRR no m/r/g no le edema  Abd:  S/nt/nd no r/g  Neuro:  Cn 2-12 grossly intact and hudson  Alert and  oriented affect appropriate   Skin:  W/d no c/c               Data:          Lab Results   Component Value Date     01/17/2022     06/21/2021    Lab Results   Component Value Date    CHLORIDE 107 01/17/2022    CHLORIDE 109 06/21/2021    Lab Results   Component Value Date    BUN 79 01/17/2022    BUN 12 06/21/2021      Lab Results   Component Value Date    POTASSIUM 3.9 01/17/2022    POTASSIUM 3.5 06/21/2021    Lab Results   Component Value Date    CO2 20 01/17/2022    CO2 24 06/21/2021    Lab Results   Component Value Date    CR 2.10 01/17/2022    CR 0.94 06/21/2021        Lab Results   Component Value Date    WBC 7.4 01/17/2022    HGB 12.8 01/17/2022    HCT 35.8 01/17/2022    MCV 86 01/17/2022     01/17/2022     Lab Results   Component Value Date    DD 1.99 (H) 01/17/2022     Lab Results   Component Value Date     (H) 01/17/2022     (H) 01/17/2022    ALKPHOS 107 01/17/2022    BILITOTAL 0.8 01/17/2022       D dimer 2.0         Imaging:     Recent Results (from the past 24 hour(s))   XR Chest Port 1 View    Narrative    EXAM: XR CHEST PORT 1 VIEW  LOCATION: [facility]  DATE/TIME: 1/17/2022 8:30 PM    INDICATION: Weakness. COVID positive.  COMPARISON: None.      Impression    IMPRESSION: Mild bilateral mid to lower lung opacities typical of COVID. No pleural effusion or pneumothorax. Normal heart size.       Head CT w/o contrast    Narrative    EXAM: CT HEAD W/O CONTRAST  LOCATION: [facility]  DATE/TIME: 1/17/2022 8:59 PM    INDICATION: Injury. Pain. Fell from standing.  COMPARISON: None.  TECHNIQUE: Routine CT Head without IV contrast. Multiplanar reformats. Dose reduction techniques were used.    FINDINGS:  INTRACRANIAL CONTENTS: No intracranial hemorrhage, extraaxial collection, or mass effect.  No CT evidence of acute infarct. Severe presumed chronic small vessel ischemic changes. Generalized atrophy of moderate degree. Cavum  septum pellucidum et cavum   vergae. Chronic appearing lacunar infarcts in the basal ganglia bilaterally. Prominent ventricles relative to sulci, however there is slight prominence of the sylvian fissures. This can be seen in a variant of normal pressure hydrocephalus called   D.E.S.H. Correlate clinically with any signs or symptoms of NPH.    VISUALIZED ORBITS/SINUSES/MASTOIDS: No intraorbital abnormality. No paranasal sinus mucosal disease. No middle ear or mastoid effusion.    BONES/SOFT TISSUES: No acute abnormality.      Impression    IMPRESSION:  1.  Negative for acute intracranial process.    2.  Chronic ischemic changes and moderate ventricular enlargement as above.

## 2022-01-18 NOTE — PHARMACY-ADMISSION MEDICATION HISTORY
Admission medication history interview status for this patient is complete. See [facility] admission navigator for allergy information, prior to admission medications and immunization status.     Medication history interview done, indicate source(s): Patient  Medication history resources (including written lists, pill bottles, clinic record):None    Changes made to PTA medication list:  Added: none  Changed: none  Reported as Not Taking: none  Removed: none    Actions taken by pharmacist (provider contacted, etc):None     Additional medication history information:None    Medication reconciliation/reorder completed by provider prior to medication history?  N   (Y/N)     Prior to Admission medications    Medication Sig Last Dose Taking? Auth Provider   lisinopril (ZESTRIL) 10 MG tablet Take 1 tablet (10 mg) by mouth daily 1/17/2022 at Unknown time Yes Ira Leong MD

## 2022-01-18 NOTE — ED TRIAGE NOTES
BIBA from home, lives by self  Increased weakness x3 days  Fall today around 1200, no injury. Grandchildren helped pt up. Normally able to ambulate without assistance.

## 2022-01-18 NOTE — UTILIZATION REVIEW
"  Admission Status; Secondary Review Determination         Under the authority of the Utilization Management Committee, the utilization review process indicated a secondary review on the above patient.  The review outcome is based on review of the medical records, discussions with staff, and applying clinical experience noted on the date of the review.          (x) Observation Status Appropriate - This patient does not meet hospital inpatient criteria and is placed in observation status. If this patient's primary payer is Medicare and was admitted as an inpatient, Condition Code 44 should be used and patient status changed to \"observation\".     RATIONALE FOR DETERMINATION   71 year old female with a history of htn admitted on 1/17/2022 with generalized weakness with DOLORES, UA consistent with UTI and she is positive for COVID without respiratory symptoms.  She is not vaccinated  There was concern for acute kidney injury on admission with creatinine above 2 with IV fluid overnight creatinine this morning is 1.2 which is close to her baseline of 0.94.  Continues to be on room air without significant respiratory symptoms.  There is concern for abnormal UA and possible UTI however she has no evidence of systemic inflammatory response or symptoms per attending physician.  The severity of illness, intensity of service provided, expected LOS and risk for adverse outcome make the care appropriate for further observation; however, doesn't meet criteria for hospital inpatient admission. Dr Sepulveda  notified of this determination.    This document was produced using voice recognition software.      The information on this document is developed by the utilization review team in order for the business office to ensure compliance.  This only denotes the appropriateness of proper admission status and does not reflect the quality of care rendered.         The definitions of Inpatient Status and Observation Status used in making the " determination above are those provided in the CMS Coverage Manual, Chapter 1 and Chapter 6, section 70.4.      Sincerely,     TANJA FAN MD    System Medical Director  Utilization Management  [facility].

## 2022-01-18 NOTE — PROGRESS NOTES
A/O x4, incontinent of urine. Denies flank pain, dysuria. No SOB, infrequent/mild cough per patient. COVID+ precautions. Pt transferring to room 624.

## 2022-01-18 NOTE — PROGRESS NOTES
[facility]    Hospitalist Progress Note    Date of Service (when I saw the patient): 01/18/2022    Assessment & Plan   Rolanda Molina is a 71 year old female who was admitted on 1/17/2022.  Summary of Stay: Rolanda Molina is a 71 year old female with a history of htn admitted on 1/17/2022 with generalized weakness with DOLORES, UA consistent with UTI and she is positive for COVID without respiratory compromise     She is not vaccinated     She's been feeling poorly for the past week or a little longer.  Began with a headache and tactile fevers.  No cp/cough/sob.  No loss of taste/smell.  2-3 days ago she's developed lots of n/v/d without abdominal pain.  There's no hematemesis, stools are not bloody but they are very dark.  She's had poor po as well      She's gotten weaker and weaker and fell on the day of admission and couldn't get up, her grandchild had to help her.  She did not pass out or hit her head.  She's not sure why she fell      She denies any dysuria/urgency/frequency to me     In the ER VS notable for mild hypertension,l she's afebrile, sats  % but slightly tachyepneic in the mid 20's.  BMP with DOLORES 79/2.1, moderately elevated LFTs ast/alt 194/100.  Trop wnl 4. UA is quite inflammatory         Problem List:   DOLORES  Looks pre-renal based on bun/creat ratio.  ACE I may have played a role as well.   -stop lisinopril  -given  total of 1 L NS  -recheck bmp in am   -follow I/O    Creatinine improved from 2.10 on admission to 1.21 today  Encourage p.o. fluid intake    Stopped IV fluids       COVID  She has no cough/sob/hypoxia.  She is having n/v/d which might be her manifestation of the illness She denies knowing of any exposure,  She is not vaccinated but wants to be now. She does not meet criteria for dex/remdesivir, CXR is not impressive but per rads read is consistent with COVID  -monitor for symptoms, currently doesn't seem to have many although weakness might be a part  of it.  CRP and D-dimer are high 112/2.0 respectively but without any respiratory sx or hypoxia do not feel CT scan warranted  -judicious IVF or risk unmasking respiratory failure     UTI  She is asx but high risk given recent diarrhea.  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 She denies significant drinking to me and etoh      Hypertension pta regimen lisinopril 10 mg every day  -hold for now in light of DOLORES     DVT Prophylaxis: Pneumatic Compression Devices  Code Status: Full Code  Functional Status: lives alone in an apt, independent in ADLs  Ornelas: will monitor for retention with bladder scan  Access:  PIV    Discharge disposition; in the next 1 to 2 days    Ingris Sepulveda MD  [phone] (P)      Interval History   Patient is resting comfortably in bed.  Denies any cough or shortness of breath currently.  No acute events since yesterday    -Data reviewed today: I reviewed all new labs and imaging results over the last 24 hours. I personally reviewed all the imaging since admission    Physical Exam   Temp: 97.4  F (36.3  C) Temp src: Temporal BP: 118/89 Pulse: 83   Resp: 18 SpO2: 99 % O2 Device: None (Room air)    Vitals:    01/18/22 1144   Weight: 50.5 kg (111 lb 4.8 oz)     Vital Signs with Ranges  Temp:  [97.4  F (36.3  C)-98.1  F (36.7  C)] 97.4  F (36.3  C)  Pulse:  [77-98] 83  Resp:  [16-26] 18  BP: (118-146)/() 118/89  SpO2:  [99 %-100 %] 99 %  No intake/output data recorded.    Constitutional: Awake, alert, cooperative, no apparent distress  Respiratory: Clear to auscultation bilaterally, no crackles or wheezing  Cardiovascular: Regular rate and rhythm, normal  S1 and S2, and no murmur noted  GI: Normal bowel sounds, soft, non-distended, non-tender  Skin/Integumen: No rashes, no cyanosis, no edema  Other:     Medications       cefTRIAXone  1 g Intravenous Q24H     heparin ANTICOAGULANT  5,000 Units Subcutaneous Q12H     sodium chloride (PF)  3 mL Intracatheter Q8H       Data   Recent Labs   Lab 01/18/22  0832 01/17/22  1916   WBC 6.5 7.4   HGB 12.4 12.8   MCV 84 86    319    135   POTASSIUM 4.0 3.9   CHLORIDE 112* 107   CO2 19* 20   BUN 57* 79*   CR 1.21* 2.10*   ANIONGAP 9 8   DONNA 9.2 9.5   * 128*   ALBUMIN  --  3.3*   PROTTOTAL  --  9.1*   BILITOTAL  --  0.8   ALKPHOS  --  107   ALT  --  100*   AST  --  194*       Recent Results (from the past 24 hour(s))   XR Chest Port 1 View    Narrative    EXAM: XR CHEST PORT 1 VIEW  LOCATION: [facility]  DATE/TIME: 1/17/2022 8:30 PM    INDICATION: Weakness. COVID positive.  COMPARISON: None.      Impression    IMPRESSION: Mild bilateral mid to lower lung opacities typical of COVID. No pleural effusion or pneumothorax. Normal heart size.       Head CT w/o contrast    Narrative    EXAM: CT HEAD W/O CONTRAST  LOCATION: [facility]  DATE/TIME: 1/17/2022 8:59 PM    INDICATION: Injury. Pain. Fell from standing.  COMPARISON: None.  TECHNIQUE: Routine CT Head without IV contrast. Multiplanar reformats. Dose reduction techniques were used.    FINDINGS:  INTRACRANIAL CONTENTS: No intracranial hemorrhage, extraaxial collection, or mass effect.  No CT evidence of acute infarct. Severe presumed chronic small vessel ischemic changes. Generalized atrophy of moderate degree. Cavum septum pellucidum et cavum   vergae. Chronic appearing lacunar infarcts in the basal ganglia bilaterally. Prominent ventricles relative to sulci, however there is slight prominence of the sylvian fissures. This can be seen in a variant of normal pressure hydrocephalus called   D.E.S.H. Correlate clinically  with any signs or symptoms of NPH.    VISUALIZED ORBITS/SINUSES/MASTOIDS: No intraorbital abnormality. No paranasal sinus mucosal disease. No middle ear or mastoid effusion.    BONES/SOFT TISSUES: No acute abnormality.      Impression    IMPRESSION:  1.  Negative for acute intracranial process.    2.  Chronic ischemic changes and moderate ventricular enlargement as above.

## 2022-01-18 NOTE — PLAN OF CARE
Pt arrived from ED on a cart, assisted into bed with assist of 2 and gait belt. VS wnl and 99% on r/a. SL in right AC, external cath in place on arrival, hooked up to wall cannister. . PCDs applied to her calves. Oriented to room, equipment, orders and floor routines. Provided lunch.  saw pt and ordered PT eval since pt c/o being weak. Abx due at 2200. Can go home if safe to be up and around.

## 2022-01-18 NOTE — ED PROVIDER NOTES
History   Chief Complaint:  Generalized Weakness     The history is provided by the patient and the EMS personnel.      Rolanda Molina is a 71 year old female with history of hypertension who presents with generalized weakness. Three days ago, the patient began experiencing increasing weakness. Today she fell but did not hit her head, experience head trauma or sustain any injuries. She is unsure why she fell and states she did not feel dizzy prior to the fall. She had to be helped off the ground by her grandchildren. Additionally, she notes she has had dark diarrhea for the past week. She denies any fever, cough, change in appetite, emesis, abdominal pain, dysuria or hematuria. Of note, she lives alone. She states she almost always takes her medications. She is not Covid vaccinated and has not had Covid.     Review of Systems   Constitutional: Negative for appetite change and fever.   Respiratory: Negative for cough.    Gastrointestinal: Positive for diarrhea. Negative for abdominal pain and vomiting.   Genitourinary: Negative for dysuria and hematuria.   Neurological: Positive for weakness (generalized). Negative for dizziness.   All other systems reviewed and are negative.    Allergies:  Penicillins    Medications:  Zestril    Past Medical History:    Hypertension    Past Surgical History:    Colonoscopy    Family History:    Mother: Type 2 diabetes, Thyroid cancer    Social History:  The patient was accompanied to the ED by EMS.    Physical Exam     Patient Vitals for the past 24 hrs:   BP Temp Temp src Pulse Resp SpO2   01/17/22 2045 (!) 146/104 -- -- 85 25 100 %   01/17/22 2030 (!) 144/99 -- -- 84 26 100 %   01/17/22 2000 134/58 -- -- 90 16 99 %   01/17/22 1908 (!) 135/103 97.4  F (36.3  C) Oral 98 -- 99 %       Physical Exam  VS: Reviewed per above  HENT: Mucous membranes moist, no nuchal rigidity  EYES: sclera anicteric  CV: Rate as noted, regular rhythm.   RESP: Effort normal. Breath sounds are normal  bilaterally.  GI: no tenderness/rebound/guarding, not distended.  NEURO: GCS 14 (mild disorientation per her RN), cranial nerves II through XII are intact, 5 out of 5 strength in all 4 extremities, sensation is intact light touch in all 4 extremities  MSK: No deformity of the extremities  SKIN: Warm and dry      Emergency Department Course   ECG:  ECG taken at 1928, ECG read at 1936  Normal sinus rhythm  Normal ECG  Rate 87 bpm. KY interval 130 ms. QRS duration 72 ms. QT/QTc 362/435 ms. P-R-T axes 46 16 46.    Imaging:  Head CT w/o contrast   Final Result   IMPRESSION:   1.  Negative for acute intracranial process.      2.  Chronic ischemic changes and moderate ventricular enlargement as above.      XR Chest Port 1 View   Final Result   IMPRESSION: Mild bilateral mid to lower lung opacities typical of COVID. No pleural effusion or pneumothorax. Normal heart size.                Laboratory:  UA with Microscopic: Blood Small (A), Protein Albumin 70 (A), Leukocyte Esterase Large (A),  (H), RBC 11 (H), Bacteria Few (A), Mucous Urine Present (A) o/w Negative    CBC: WBC 7.4, HGB 12.8,   CMP: Glucose 128 (H), Urea Nitrogen 79 (H), GFR 24 (L), Albumin 3.3 (L), Protein Total 9.1 (H),  (H),  (H) o/w WNL (Creatinine 2.10 (H))    Troponin (Collected 1916): 7    Lactic acid (Collected 1916): 1.8    Magnesium: 3.1 ((H)    TSH with Free T4 Reflex: 4.09 (H)    T4 Free: 1.30    Alcohol level blood: <0.01    Manual Differential: Absolute Metamyelocytes 0.1 (H), [place] Cells Slight (A)    Symptomatic COVID-19 Virus (Coronavirus) by PCR Nasopharyngeal swab: Positive (A)    Influenza A/B Antigen: Negative    Urine Culture Aerobic Bacterial: Pending    Emergency Department Course:  Reviewed:  I reviewed nursing notes, vitals, past medical history and care everywhere    Assessments:  1906 I obtained history and examined the patient as noted above.     1914 I spoke with the patient's daughter regarding the  patient.    2116 I rechecked and updated the patient regarding the imaging results, laboratory results and the plan for care.    Consults:   2029 I spoke with the hospitalist, Dr. Saldana, regarding placement for the patient.    Interventions:  1922 NS 1L IV Bolus  2008 NS 1L IV Bolus    Disposition:  The patient was admitted to the hospital under the care of Dr. Saldana.     Impression & Plan   Covid-19  Rolanda Molina was evaluated during a global COVID-19 pandemic, which necessitated consideration that the patient might be at risk for infection with the SARS-CoV-2 virus that causes COVID-19.   Applicable protocols for evaluation were followed during the patient's care.   COVID-19 was considered as part of the patient's evaluation. The plan for testing is:  a test was obtained during this visit.    Medical Decision Making:  Patient presents to the ER for evaluation of generalized weakness, diarrhea, fall, poor p.o. intake.  On arrival vital signs are reassuring.  On exam I do not see external signs of trauma.  She is awake and alert.  I did not detect any confusion although her nurse felt that she was slightly disoriented with serial interactions.  COVID testing is positive.  I suspect this is the cause of her symptoms.  Labs reveal DOLORES, likely prerenal in nature due to history of diarrhea and poor p.o. intake from family.  There is also nonspecific transaminitis.  No abdominal pain.  Urinalysis is concerning for UTI and that she was given Rocephin.  Chest x-ray shows bilateral opacities concerning for COVID-19 infection.  Patient denies chest pain or shortness of breath or cough.  CT of the head is unremarkable.  IV fluids were initially started but she only received 600 cc approximately.  After discussion with hospitalist service, plan for stopping further fluids in the ER at this time due to superimposed COVID-19 infection.    Diagnosis:    ICD-10-CM    1. Infection due to 2019 novel coronavirus  U07.1    2. DOLORES  (acute kidney injury) (H)  N17.9    3. Fall, initial encounter  W19.XXXA    4. Generalized muscle weakness  M62.81    5. Transaminitis  R74.01    6. Urinary tract infection with hematuria, site unspecified  N39.0     R31.9        Scribe Disclosure:  I, Luis Alberto Sarkar, am serving as a scribe at 7:04 PM on 1/17/2022 to document services personally performed by Tyler Vergara MD based on my observations and the provider's statements to me.      Tyler Vergara MD  01/17/22 213

## 2022-01-18 NOTE — ED NOTES
Bed: ED15  Expected date: 1/17/22  Expected time: 6:58 PM  Means of arrival: Ambulance  Comments:  BV3  71F  Increasing weakness/fall

## 2022-01-19 ENCOUNTER — APPOINTMENT (OUTPATIENT)
Dept: PHYSICAL THERAPY | Facility: CLINIC | Age: 72
End: 2022-01-19
Attending: INTERNAL MEDICINE
Payer: COMMERCIAL

## 2022-01-19 VITALS
RESPIRATION RATE: 20 BRPM | WEIGHT: 111.3 LBS | DIASTOLIC BLOOD PRESSURE: 81 MMHG | SYSTOLIC BLOOD PRESSURE: 135 MMHG | TEMPERATURE: 97 F | BODY MASS INDEX: 17.89 KG/M2 | HEART RATE: 82 BPM | OXYGEN SATURATION: 100 % | HEIGHT: 66 IN

## 2022-01-19 LAB
ANION GAP SERPL CALCULATED.3IONS-SCNC: 6 MMOL/L (ref 3–14)
BUN SERPL-MCNC: 30 MG/DL (ref 7–30)
CALCIUM SERPL-MCNC: 9.5 MG/DL (ref 8.5–10.1)
CHLORIDE BLD-SCNC: 110 MMOL/L (ref 94–109)
CO2 SERPL-SCNC: 21 MMOL/L (ref 20–32)
CREAT SERPL-MCNC: 0.86 MG/DL (ref 0.52–1.04)
CRP SERPL-MCNC: 109 MG/L (ref 0–8)
GFR SERPL CREATININE-BSD FRML MDRD: 71 ML/MIN/1.73M2
GLUCOSE BLD-MCNC: 110 MG/DL (ref 70–99)
POTASSIUM BLD-SCNC: 4.8 MMOL/L (ref 3.4–5.3)
SODIUM SERPL-SCNC: 137 MMOL/L (ref 133–144)

## 2022-01-19 PROCEDURE — 97530 THERAPEUTIC ACTIVITIES: CPT | Mod: GP

## 2022-01-19 PROCEDURE — G0378 HOSPITAL OBSERVATION PER HR: HCPCS

## 2022-01-19 PROCEDURE — 99217 PR OBSERVATION CARE DISCHARGE: CPT | Performed by: INTERNAL MEDICINE

## 2022-01-19 PROCEDURE — 96376 TX/PRO/DX INJ SAME DRUG ADON: CPT

## 2022-01-19 PROCEDURE — 80048 BASIC METABOLIC PNL TOTAL CA: CPT | Performed by: INTERNAL MEDICINE

## 2022-01-19 PROCEDURE — 99225 PR SUBSEQUENT OBSERVATION CARE,LEVEL II: CPT | Performed by: INTERNAL MEDICINE

## 2022-01-19 PROCEDURE — 97162 PT EVAL MOD COMPLEX 30 MIN: CPT | Mod: GP

## 2022-01-19 PROCEDURE — 250N000011 HC RX IP 250 OP 636: Performed by: INTERNAL MEDICINE

## 2022-01-19 PROCEDURE — 86140 C-REACTIVE PROTEIN: CPT | Performed by: INTERNAL MEDICINE

## 2022-01-19 PROCEDURE — 36415 COLL VENOUS BLD VENIPUNCTURE: CPT | Performed by: INTERNAL MEDICINE

## 2022-01-19 PROCEDURE — 97116 GAIT TRAINING THERAPY: CPT | Mod: GP

## 2022-01-19 PROCEDURE — 96372 THER/PROPH/DIAG INJ SC/IM: CPT | Performed by: INTERNAL MEDICINE

## 2022-01-19 RX ORDER — CEFUROXIME AXETIL 500 MG/1
500 TABLET ORAL 2 TIMES DAILY
Qty: 8 TABLET | Refills: 0 | Status: SHIPPED | OUTPATIENT
Start: 2022-01-19 | End: 2022-01-23

## 2022-01-19 RX ORDER — CEFTRIAXONE 1 G/1
1 INJECTION, POWDER, FOR SOLUTION INTRAMUSCULAR; INTRAVENOUS EVERY 24 HOURS
Status: DISCONTINUED | OUTPATIENT
Start: 2022-01-19 | End: 2022-01-19

## 2022-01-19 RX ORDER — CEFTRIAXONE 1 G/1
1 INJECTION, POWDER, FOR SOLUTION INTRAMUSCULAR; INTRAVENOUS EVERY 24 HOURS
Status: COMPLETED | OUTPATIENT
Start: 2022-01-19 | End: 2022-01-19

## 2022-01-19 RX ADMIN — CEFTRIAXONE 1 G: 1 INJECTION, POWDER, FOR SOLUTION INTRAMUSCULAR; INTRAVENOUS at 16:32

## 2022-01-19 RX ADMIN — HEPARIN SODIUM 5000 UNITS: 5000 INJECTION INTRAVENOUS; SUBCUTANEOUS at 12:23

## 2022-01-19 RX ADMIN — HEPARIN SODIUM 5000 UNITS: 5000 INJECTION INTRAVENOUS; SUBCUTANEOUS at 00:17

## 2022-01-19 NOTE — PLAN OF CARE
"PRIMARY DIAGNOSIS: \"GENERIC\" NURSING  OUTPATIENT/OBSERVATION GOALS TO BE MET BEFORE DISCHARGE:  1. ADLs back to baseline: No    Activity and level of assistance: Up with maximum assistance. A-2 GB, and walker.  2. Pain status: Pain free.    3. Return to near baseline physical activity: No     Discharge Planner Nurse   Safe discharge environment identified: No  Barriers to discharge: Yes       Entered by: Natalia Norman 01/19/2022 8:00 AM     Please review provider order for any additional goals.   Nurse to notify provider when observation goals have been met and patient is ready for discharge.    Pt A&Ox3, VSS on RA at %. Denied pain. Has an infreq. Dry nonproductive cough. Rocephin infused.   "

## 2022-01-19 NOTE — PROGRESS NOTES
01/19/22 0900   Quick Adds   Quick Adds Certification   Type of Visit Initial PT Evaluation       Present no   Living Environment   People in home alone   Current Living Arrangements apartment   Home Accessibility no concerns   Transportation Anticipated family or friend will provide   Living Environment Comments Patient lives alone in an apartment.  She sleeps in a standard bed.  Patient has a tub with a grab bar.  Patient does not drive but states she has grandchildren that assist with transportation.   Self-Care   Usual Activity Tolerance moderate   Current Activity Tolerance fair   Regular Exercise No   Equipment Currently Used at Home none   Activity/Exercise/Self-Care Comment Patient reports independence with dressing, bathing, and toileting tasks.  Patient ambulates in the home without assistive device.  Patient still gets her own groceries.  Patient does her own cooking.       Disability/Function   Walking or Climbing Stairs Difficulty no   Dressing/Bathing Difficulty no   Toileting issues no   Doing Errands Independently Difficulty (such as shopping) no   Fall history within last six months yes   Number of times patient has fallen within last six months 1   General Information   Onset of Illness/Injury or Date of Surgery 01/17/22   Referring Physician Ingris Sepulveda MD   Patient/Family Therapy Goals Statement (PT) Return home   Pertinent History of Current Problem (include personal factors and/or comorbidities that impact the POC) 71 year old female with a history of htn admitted on 1/17/2022 with generalized weakness with DOLORES, UA consistent with UTI and she is positive for COVID without respiratory compromise.   Existing Precautions/Restrictions fall   Cognition   Orientation Status (Cognition) oriented x 4   Affect/Mental Status (Cognition) WFL   Follows Commands (Cognition) WFL   Pain Assessment   Patient Currently in Pain No   Integumentary/Edema   Integumentary/Edema Comments  Age-related skin changes   Posture    Posture Forward head position;Protracted shoulders   Range of Motion (ROM)   ROM Quick Adds ROM WFL   Strength   Manual Muscle Testing Quick Adds Deficits observed during functional mobility   Strength Comments Generalized lower extremity weakness, grossly 4/5   Bed Mobility   Comment (Bed Mobility) Bed mobility not formally assessed as patient starting and ending session seated in recliner chair.   Transfers   Transfer Safety Comments Patient performs x6 sit<>stand transfers from recliner chair with FWW and CGA.  Patient cued for safe hand placement pushing from chair armrests.  Patient with difficulty achieving full upright posture despite verbal and tactile cues for hip extension.  Patient initally pushing posterior legs against chair when standing.  Patient with poor eccentric control during stand>sit despite cues to reach back for chair armrest.  Patient reporting increased lower extremity fatigue with repeated sit<>stand transfers but vitals remain stable on room air.   Gait/Stairs (Locomotion)   Distance in Feet (Required for LE Total Joints) 25'   Comment (Gait/Stairs) Patient ambulating 25' in room with FWW and CGA.  Patient demonstrating step-to gait pattern with decreased step length (shorter on right lower extremity) and significantly slowed gait speed.  Gait distance limited by fatigue.     Balance   Balance Comments Impaired dynamic balance   Sensory Examination   Sensory Perception patient reports no sensory changes   Clinical Impression   Criteria for Skilled Therapeutic Intervention yes, treatment indicated   PT Diagnosis (PT) Impaired gait   Influenced by the following impairments Generalized lower extremity weakness, impaired dynamic balance, poor activity tolerance   Functional limitations due to impairments Impaired independence with bed mobility, transfers, and gait   Clinical Presentation Evolving/Changing   Clinical Presentation Rationale Clinical  judgement, PMH, social support, insight into deficits   Clinical Decision Making (Complexity) moderate complexity   Therapy Frequency (PT) 5x/week   Predicted Duration of Therapy Intervention (days/wks) 5 days   Planned Therapy Interventions (PT) balance training;bed mobility training;gait training;home exercise program;patient/family education;strengthening;transfer training;progressive activity/exercise   Anticipated Equipment Needs at Discharge (PT) walker, rolling  (Order at discharge)   Risk & Benefits of therapy have been explained evaluation/treatment results reviewed;care plan/treatment goals reviewed;risks/benefits reviewed;participants voiced agreement with care plan;participants included;patient   PT Discharge Planning    PT Discharge Recommendation (DC Rec) Transitional Care Facility;home with assist;home with home care physical therapy   PT Rationale for DC Rec Patient presents significantly below independent baseline, requires Ax1 for transfers and gait due to lower extremity weakness, impaired balance, and poor activity tolerance. Patient lives alone but states her sister is flying in from out of state and may be able to stay with her for several weeks. Recommend continued IP PT and TCU at discharge to progress independence with functional mobility tasks.  Discharge home may be possible if patient has 24/7 assistance x1 available, FWW, and HH PT to progress safety with ADLs and mobility tasks.  Patient would requires HH PT because she is not a community ambulator so leaving the home does pose significant and taxing effort.   Therapy Certification   Start of care date 01/19/22   Certification date from 01/19/22   Certification date to 01/26/22   Medical Diagnosis Impaired gait   Total Evaluation Time   Total Evaluation Time (Minutes) 10

## 2022-01-19 NOTE — UTILIZATION REVIEW
Concurrent stay review; Secondary Review Determination     Under the authority of the Utilization Management Committee, the utilization review process indicated a secondary review on the above patient.  The review outcome is based on review of the medical records, discussions with staff, and applying clinical experience noted on the date of the review.          (x) Observation Status Appropriate - Concurrent stay review    RATIONALE FOR DETERMINATION   72 yo female who is unvaccinated for covid-19 who presented with weakness and dehydration, found to have covid-19 infection. No signs or symptoms of sepsis nor hypoxia. Reviewed 1/18/22 by physician in utilization review.     As of 1/19/2022: no clinical changes. Her respiratory status has remained stable and still hasn't needed supplemental oxygen nor met criteria for remdesivir or dexamethasone treatment. Her main problem is weakness due to viral infection and need for home with 24 hour home assistance vs TCU placement. Medically stable for discharge when disposition finalized.      Patient is clinically improving and there is no clear indication to change patient's status to inpatient. The severity of illness, intensity of service provided, expected LOS and risk for adverse outcome make the care appropriate for observation.    This document was produced using voice recognition software     The information on this document is developed by the utilization review team in order for the business office to ensure compliance.  This only denotes the appropriateness of proper admission status and does not reflect the quality of care rendered.         The definitions of Inpatient Status and Observation Status used in making the determination above are those provided in the CMS Coverage Manual, Chapter 1 and Chapter 6, section 70.4.      Sincerely,   Joelle Garcia MD  Utilization Review  Physician Advisor  [facility].

## 2022-01-19 NOTE — PROGRESS NOTES
[facility]      OUTPATIENT PHYSICAL THERAPY EVALUATION  PLAN OF TREATMENT FOR OUTPATIENT REHABILITATION  (COMPLETE FOR INITIAL CLAIMS ONLY)  Patient's Last Name, First Name, M.I.  YOB: 1950  Rolanda Molina                        Provider's Name  [facility] Medical Record No.  [id number]                               Onset Date:  01/17/22   Start of Care Date:  01/19/22      Type:     _X_PT   ___OT   ___SLP Medical Diagnosis:  Impaired gait                        PT Diagnosis:  Impaired gait   Visits from SOC:  1   _________________________________________________________________________________  Plan of Treatment/Functional Goals    Planned Interventions: balance training,bed mobility training,gait training,home exercise program,patient/family education,strengthening,transfer training,progressive activity/exercise     Goals: See Physical Therapy Goals on Care Plan in [employer] electronic health record.    Therapy Frequency: 5x/week  Predicted Duration of Therapy Intervention: 5 days  _________________________________________________________________________________    I CERTIFY THE NEED FOR THESE SERVICES FURNISHED UNDER        THIS PLAN OF TREATMENT AND WHILE UNDER MY CARE     (Physician co-signature of this document indicates review and certification of the therapy plan).                Certification date from: 01/19/22, Certification date to: 01/26/22    Referring Physician: Ingris Sepulveda MD            Initial Assessment        See Physical Therapy evaluation dated 01/19/22 in Epic electronic health record.

## 2022-01-19 NOTE — ACP (ADVANCE CARE PLANNING)
SPIRITUAL HEALTH SERVICES Progress Note   RH Advance Directive Education    Responded to a consult order for Advance Care Planning (ACP) education for Rolanda Molina.  ACP materials provided.  Pt preparing to discharge, per RN.    Red Iniguez M.Div., [facility]  Staff   Phone [phone]

## 2022-01-19 NOTE — PROGRESS NOTES
Care Management Discharge Note    Discharge Date: 01/19/2022       Discharge Disposition: Home Care      Discharge Transportation: family or friend will provide    Private pay costs discussed: Not applicable      Additional Information:  Pt discharging today needing home care services. Pt covid + would be unable to find TCU placement. Home care referral sent to [facility]. AVS updated and spoke with daughter Bushra who will be able to pick pt up today after work, her cell phone number is [phone].    Anabel Cobb RN, BSN CTS  Care Coordinator  [facility]   [phone]

## 2022-01-19 NOTE — DISCHARGE INSTRUCTIONS
Your home care referral was sent to [facility]. They will call you to set up your initial visit. Their contact number is [phone].

## 2022-01-19 NOTE — PLAN OF CARE
Evening RN ([phone])    Patient vital signs are at baseline: Yes  Patient able to ambulate as they were prior to admission or with assist devices provided by therapies during their stay:  No,  Reason:  Requiring A2 for safe transfers short distances.  Patient MUST void prior to discharge:  Yes  Patient able to tolerate oral intake:  Yes  Pain has adequate pain control using Oral analgesics:  Yes      Pt A/O x4.  VSS and afebrile.  Pt denies pain.  CMS intact.  Skin ok.  Up A2 with walker and gait belt - fairly weak with any movement, short distances only.  Voiding adequately.  Tolerating regular diet well.  IV Rocephin as antibiotic.  Plan is home on discharge.  Will continue to monitor.

## 2022-01-19 NOTE — PLAN OF CARE
"PRIMARY DIAGNOSIS: \"GENERIC\" NURSING  OUTPATIENT/OBSERVATION GOALS TO BE MET BEFORE DISCHARGE:  ADLs back to baseline: No  Activity and level of assistance: Up with maximum assistance. A-2 w/ GB and walker.   1. Pain status: Pain free.    2. Return to near baseline physical activity: No     Discharge Planner Nurse   Safe discharge environment identified: No  Barriers to discharge: Yes       Entered by: Natalia Norman 01/19/2022 7:57 AM     Please review provider order for any additional goals.   Nurse to notify provider when observation goals have been met and patient is ready for discharge.  "

## 2022-01-19 NOTE — PROGRESS NOTES
[facility]    Hospitalist Progress Note    Date of Service (when I saw the patient): 01/19/2022    Assessment & Plan   Rolanda Molina is a 71 year old female who was admitted on 1/17/2022.  Summary of Stay: Rolanda Molina is a 71 year old female with a history of htn admitted on 1/17/2022 with generalized weakness with DOLORES, UA consistent with UTI and she is positive for COVID without respiratory compromise     She is not vaccinated     She's been feeling poorly for the past week or a little longer.  Began with a headache and tactile fevers.  No cp/cough/sob.  No loss of taste/smell.  2-3 days ago she's developed lots of n/v/d without abdominal pain.  There's no hematemesis, stools are not bloody but they are very dark.  She's had poor po as well      She's gotten weaker and weaker and fell on the day of admission and couldn't get up, her grandchild had to help her.  She did not pass out or hit her head.  She's not sure why she fell      She denies any dysuria/urgency/frequency to me     In the ER VS notable for mild hypertension,l she's afebrile, sats  % but slightly tachyepneic in the mid 20's.  BMP with DOLORES 79/2.1, moderately elevated LFTs ast/alt 194/100.  Trop wnl 4. UA is quite inflammatory         Problem List:   DOLORES  Looks pre-renal based on bun/creat ratio.  ACE I may have played a role as well.   -stop lisinopril  -given  total of 1 L NS  -recheck bmp in am   -follow I/O    Creatinine improved from 2.10 on admission to 1.21-0.8  today  Encourage p.o. fluid intake    Stopped IV fluids       COVID  She has no cough/sob/hypoxia.  She is having n/v/d which might be her manifestation of the illness She denies knowing of any exposure,  She is not vaccinated but wants to be now. She does not meet criteria for dex/remdesivir, CXR is not impressive but per rads read is consistent with COVID  -monitor for symptoms, currently doesn't seem to have many although weakness might be a  part of it.  CRP and D-dimer are high 112/2.0 respectively but without any respiratory sx or hypoxia do not feel CT scan warranted  -judicious IVF or risk unmasking respiratory failure     UTI  She is asx but high risk given recent diarrhea.  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 She denies significant drinking to me and etoh      Hypertension pta regimen lisinopril 10 mg every day  -hold for now in light of DOLORES     DVT Prophylaxis: Pneumatic Compression Devices  Code Status: Full Code  Functional Status: lives alone in an apt, independent in ADLs  Ornelas: will monitor for retention with bladder scan  Access:  PIV    Discharge disposition; pt needs 24/7 asistance with family called and updated family and the daughter said she will discuss it with her siblings and let us know about their plan     Home with family versus TCU pending family's decision     Ingris Sepulveda MD  [phone] (P)      Interval History   Patient is resting comfortably in bed.  Denies any cough or shortness of breath currently.  No acute events since yesterday. Very weak and needs help and asistance with walking     -Data reviewed today: I reviewed all new labs and imaging results over the last 24 hours. I personally reviewed all the imaging since admission    Physical Exam   Temp: 97.3  F (36.3  C) Temp src: Temporal BP: 123/69 Pulse: 86   Resp: 20 SpO2: 100 % O2 Device: None (Room air)    Vitals:    01/18/22 1144   Weight: 50.5 kg (111 lb 4.8 oz)     Vital Signs with Ranges  Temp:  [97.3  F (36.3  C)-97.8  F (36.6  C)] 97.3  F (36.3  C)  Pulse:  [86-94] 86  Resp:  [18-22] 20  BP: (123-136)/(69-84)  123/69  SpO2:  [96 %-100 %] 100 %  I/O last 3 completed shifts:  In: 240 [P.O.:240]  Out: 250 [Urine:250]    Constitutional: Awake, alert, cooperative, no apparent distress  Respiratory: Clear to auscultation bilaterally, no crackles or wheezing  Cardiovascular: Regular rate and rhythm, normal S1 and S2, and no murmur noted  GI: Normal bowel sounds, soft, non-distended, non-tender  Skin/Integumen: No rashes, no cyanosis, no edema  Other:     Medications       cefTRIAXone  1 g Intravenous Q24H     heparin ANTICOAGULANT  5,000 Units Subcutaneous Q12H     sodium chloride (PF)  3 mL Intracatheter Q8H       Data   Recent Labs   Lab 01/19/22  0731 01/18/22  0832 01/17/22  1916   WBC  --  6.5 7.4   HGB  --  12.4 12.8   MCV  --  84 86   PLT  --  290 319    140 135   POTASSIUM 4.8 4.0 3.9   CHLORIDE 110* 112* 107   CO2 21 19* 20   BUN 30 57* 79*   CR 0.86 1.21* 2.10*   ANIONGAP 6 9 8   DONNA 9.5 9.2 9.5   * 102* 128*   ALBUMIN  --   --  3.3*   PROTTOTAL  --   --  9.1*   BILITOTAL  --   --  0.8   ALKPHOS  --   --  107   ALT  --   --  100*   AST  --   --  194*       No results found for this or any previous visit (from the past 24 hour(s)).

## 2022-01-20 ENCOUNTER — PATIENT OUTREACH (OUTPATIENT)
Dept: CARE COORDINATION | Facility: CLINIC | Age: 72
End: 2022-01-20
Payer: MEDICARE

## 2022-01-20 ENCOUNTER — TELEPHONE (OUTPATIENT)
Dept: INTERNAL MEDICINE | Facility: CLINIC | Age: 72
End: 2022-01-20
Payer: MEDICARE

## 2022-01-20 LAB — BACTERIA UR CULT: ABNORMAL

## 2022-01-20 NOTE — PROGRESS NOTES
Pt discharged at 1830. IV removed. Bleeding stopped. VSS. Patient toileted and dressed. Paperwork signed. ACP documents given and explained. Medications sent with. Family pickup.

## 2022-01-20 NOTE — PROGRESS NOTES
Clinic Care Coordination Contact  [facility]/Voicemail       Clinical Data: Care Coordinator Outreach  Outreach attempted x 1.  Left message on patient's voicemail with call back information and requested return call.    Plan: Care Coordinator will try to reach patient again in 1-2 business days.    ALY Levi. Public Health  Community Health Worker  [facility]  Clinic Care Coordination  [phone]

## 2022-01-20 NOTE — LETTER
[facility] CARE COORDINATION  [facility]  January 21, 2022    Rolanda Molina  [address]   [place] [address]      Dear Rolanda,    I am a clinic community health worker who works with Ira Leong MD at the [facility]. I wanted to introduce myself and provide you with my contact information for you to be able to call me with any questions or concerns. Below is a description of clinic care coordination and how I can further assist you.      The clinic care coordination team is made up of a registered nurse,  and community health worker who understand the health care system. The goal of clinic care coordination is to help you manage your health and improve access to the health care system in the most efficient manner. The team can assist you in meeting your health care goals by providing education, coordinating services, strengthening the communication among your providers and supporting you with any resource needs.    Please feel free to contact me at [phone] with any questions or concerns. We are focused on providing you with the highest-quality healthcare experience possible and that all starts with you.     Sincerely,     ALY Levi. Public Health  Community Health Worker  [facility]  Clinic Care Coordination  [phone]

## 2022-01-20 NOTE — TELEPHONE ENCOUNTER
Margarita RN with [facility] [phone]  Patient was discharge and a RN can not get to see patient until 1-. Need verbal ok

## 2022-01-20 NOTE — DISCHARGE SUMMARY
[facility]  Discharge Summary        Rolanda Molina MRN# [id number]   YOB: 1950 Age: 71 year old     Date of Admission:  1/17/2022  Date of Discharge:  1/20/2022  Admitting Physician:  Ingris Sepulveda MD  Discharge Physician: Ingris Sepulveda MD  Discharging Service: Hospitalist     Primary Provider: Ira Leong  Primary Care Physician Phone Number: [phone]         Discharge Diagnoses/Problem Oriented Hospital Course (Providers):    Rolanda Molina was admitted on 1/17/2022 by Ingris Sepulveda MD and I would refer you to their history and physical.  The following problems were addressed during her hospitalization:    Roalnda Molina is a 71 year old female who was admitted on 1/17/2022.  Summary of Stay: Rolanda Molina is a 71 year old female with a history of htn admitted on 1/17/2022 with generalized weakness with DOLORES, UA consistent with UTI and she is positive for COVID without respiratory compromise     She is not vaccinated     She's been feeling poorly for the past week or a little longer.  Began with a headache and tactile fevers.  No cp/cough/sob.  No loss of taste/smell.  2-3 days ago she's developed lots of n/v/d without abdominal pain.  There's no hematemesis, stools are not bloody but they are very dark.  She's had poor po as well      She's gotten weaker and weaker and fell on the day of admission and couldn't get up, her grandchild had to help her.  She did not pass out or hit her head.  She's not sure why she fell      She denies any dysuria/urgency/frequency to me     In the ER VS notable for mild hypertension,l she's afebrile, sats  % but slightly tachyepneic in the mid 20's.  BMP with DOLORES 79/2.1, moderately elevated LFTs ast/alt 194/100.  Trop wnl 4. UA is quite inflammatory         Problem List:   DOLORES  Looks pre-renal based on bun/creat ratio.  ACE I may have played a role as well.   -stop lisinopril  -given  total of 1 L NS  -recheck bmp in am    -follow I/O     Creatinine improved from 2.10 on admission to 1.21-0.8  today  Encourage p.o. fluid intake     Stopped IV fluids. Stopped lisinopril on discharge         COVID  She has no cough/sob/hypoxia.  She is having n/v/d which might be her manifestation of the illness She denies knowing of any exposure,  She is not vaccinated but wants to be now. She does not meet criteria for dex/remdesivir, CXR is not impressive but per rads read is consistent with COVID  -monitor for symptoms, currently doesn't seem to have many although weakness might be a part of it.  CRP and D-dimer are high 112/2.0 respectively but without any respiratory sx or hypoxia do not feel CT scan warranted  remains asymptomatic from COVID stand point      UTI  She is asx but high risk given recent diarrhea.  This also could be contributing to her weakness  -ceftriaxone x 3 days  Urine culture results 50-100K ecoli   prieto discharge with oral ceftin for total 1 week course      Generalized weakness MF in origin, dehydration from n/v, uremia, uti, and covid  -hopefully to improve with above measures  -PT consultation requested recommended 24/7 asist with family . Pt will go home with family's help      Hepatitis  Likely virally mediated although in a pattern suggestive of alcohol toxicity. She denies significant drinking to me and etoh      Hypertension pta regimen lisinopril 10 mg every day  -hold for now in light of DOLORES  Stopped lisinopril on discharge      DVT Prophylaxis: Pneumatic Compression Devices  Code Status: Full Code  Functional Status: lives alone in an apt, independent in ADLs  Ornelas: will monitor for retention with bladder scan  Access:  PIV     Discharge disposition; home today with family in stable condition     Ingris Sepulveda MD  [phone] (P)             Code Status:      Full Code        Brief Hospital Stay Summary Sent Home With Patient in AVS:        Reason for your hospital stay      DOLORES , covid infection and UTI                  Important Results:      See below         Pending Results:        Unresulted Labs Ordered in the Past 30 Days of this Admission     Date and Time Order Name Status Description    1/17/2022  8:40 PM Urine Culture Preliminary             Discharge Instructions and Follow-Up:      Follow-up Appointments     Follow-up and recommended labs and tests       F/u with PCP in 1week. Recheck BMP in 1week               Discharge Disposition:      Discharged to home        Discharge Medications:        Discharge Medication List as of 1/19/2022  4:15 PM      START taking these medications    Details   aspirin (ASA) 325 MG EC tablet Take 1 tablet (325 mg) by mouth daily, Disp-27 tablet, R-0, E-Prescribe      cefuroxime (CEFTIN) 500 MG tablet Take 1 tablet (500 mg) by mouth 2 times daily for 4 days, Disp-8 tablet, R-0, E-Prescribe         STOP taking these medications       lisinopril (ZESTRIL) 10 MG tablet Comments:   Reason for Stopping:                 Allergies:         Allergies   Allergen Reactions     Penicillins      Swelling of arms and legs           Consultations This Hospital Stay:      Consultation during this admission received from PT               Discharge Time:      Greater than 30 minutes.        Image Results From This Hospital Stay (For Non-EPIC Providers):        Results for orders placed or performed during the hospital encounter of 01/17/22   XR Chest Port 1 View    Narrative    EXAM: XR CHEST PORT 1 VIEW  LOCATION: [facility]  DATE/TIME: 1/17/2022 8:30 PM    INDICATION: Weakness. COVID positive.  COMPARISON: None.      Impression    IMPRESSION: Mild bilateral mid to lower lung opacities typical of COVID. No pleural effusion or pneumothorax. Normal heart size.       Head CT w/o contrast    Narrative    EXAM: CT HEAD W/O CONTRAST  LOCATION: [facility]  DATE/TIME: 1/17/2022 8:59 PM    INDICATION: Injury. Pain. Fell from standing.  COMPARISON:  None.  TECHNIQUE: Routine CT Head without IV contrast. Multiplanar reformats. Dose reduction techniques were used.    FINDINGS:  INTRACRANIAL CONTENTS: No intracranial hemorrhage, extraaxial collection, or mass effect.  No CT evidence of acute infarct. Severe presumed chronic small vessel ischemic changes. Generalized atrophy of moderate degree. Cavum septum pellucidum et cavum   vergae. Chronic appearing lacunar infarcts in the basal ganglia bilaterally. Prominent ventricles relative to sulci, however there is slight prominence of the sylvian fissures. This can be seen in a variant of normal pressure hydrocephalus called   D.E.S.H. Correlate clinically with any signs or symptoms of NPH.    VISUALIZED ORBITS/SINUSES/MASTOIDS: No intraorbital abnormality. No paranasal sinus mucosal disease. No middle ear or mastoid effusion.    BONES/SOFT TISSUES: No acute abnormality.      Impression    IMPRESSION:  1.  Negative for acute intracranial process.    2.  Chronic ischemic changes and moderate ventricular enlargement as above.             Most Recent Lab Results In EPIC (For Non-EPIC Providers):    Most Recent 3 CBC's:  Recent Labs   Lab Test 01/18/22  0832 01/17/22 1916 06/21/21  1010   WBC 6.5 7.4 7.7   HGB 12.4 12.8 12.1   MCV 84 86 84    319 243      Most Recent 3 BMP's:  Recent Labs   Lab Test 01/19/22  0731 01/18/22  0832 01/17/22 1916    140 135   POTASSIUM 4.8 4.0 3.9   CHLORIDE 110* 112* 107   CO2 21 19* 20   BUN 30 57* 79*   CR 0.86 1.21* 2.10*   ANIONGAP 6 9 8   DONNA 9.5 9.2 9.5   * 102* 128*     Most Recent 3 Troponin's:No lab results found.    Invalid input(s): TROP, TROPONINIES  Most Recent 3 INR's:No lab results found.  Most Recent 2 LFT's:  Recent Labs   Lab Test 01/17/22  1916 06/21/21  1010   * 19   * 18   ALKPHOS 107 99   BILITOTAL 0.8 0.4     Most Recent Cholesterol Panel:  Recent Labs   Lab Test 06/21/21  1010   CHOL 199   LDL 97   HDL 71   TRIG 157*     Most Recent  6 Bacteria Isolates From Any Culture (See EPIC Reports for Culture Details):No lab results found.  Most Recent TSH, T4 and HgbA1c:   Recent Labs   Lab Test 01/17/22 1916   TSH 4.09*   T4 1.30

## 2022-01-20 NOTE — PLAN OF CARE
Physical Therapy Discharge Summary    Reason for therapy discharge:    Discharged to transitional care facility.    Progress towards therapy goal(s). See goals on Care Plan in [facility] electronic health record for goal details.  Goals not met.  Barriers to achieving goals:   discharge from facility.    Therapy recommendation(s):    Continued therapy is recommended.  Rationale/Recommendations:  to cont with  IP PT  plan.

## 2022-01-21 NOTE — PROGRESS NOTES
Clinic Care Coordination Contact  [facility]/Voicemail       Clinical Data: Care Coordinator Outreach  Outreach attempted x 2.  Left message on patient's voicemail with call back information and requested return call.    Plan: Care Coordinator will send care coordination introduction letter with care coordinator contact information and explanation of care coordination services via mail. Care Coordinator will do no further outreaches at this time.      ALY Levi. Public Health  Community Health Worker  [facility]  Clinic Care Coordination  [phone]

## 2022-01-23 ENCOUNTER — MEDICAL CORRESPONDENCE (OUTPATIENT)
Dept: HEALTH INFORMATION MANAGEMENT | Facility: CLINIC | Age: 72
End: 2022-01-23
Payer: MEDICARE

## 2022-01-24 ENCOUNTER — TELEPHONE (OUTPATIENT)
Dept: INTERNAL MEDICINE | Facility: CLINIC | Age: 72
End: 2022-01-24
Payer: MEDICARE

## 2022-01-24 NOTE — TELEPHONE ENCOUNTER
[facility] calling for verbal orders. SKILLED NURSING 2/first week and then 1/wk for 3weeks and 1/month for 1 time.  HHA 1/week for 6 weeks.  Arsalan [phone] ok to leave message

## 2022-01-24 NOTE — TELEPHONE ENCOUNTER
Call to Arsalan [phone] at [place]. Detailed message left with Dr. Leong's okay for orders as seen below.     Ly VALDEZ RN   [facility]

## 2022-01-24 NOTE — TELEPHONE ENCOUNTER
Please see message below from [facility] asking for verbal orders for skilled nursing.     Last office visit with Dr. Leong was on 6/21/2021.     Ly VALDEZ RN   [facility]

## 2022-01-28 ENCOUNTER — TELEPHONE (OUTPATIENT)
Dept: INTERNAL MEDICINE | Facility: CLINIC | Age: 72
End: 2022-01-28
Payer: MEDICARE

## 2022-01-28 NOTE — TELEPHONE ENCOUNTER
Received a call from Marli with [facility] requesting orders for PT:    2 times a week x 2 weeks  1 time a week x 2 weeks    Balance, strengthening and endurance.    Verbal approval given per G RN protocol.    Jaimie Dalton RN

## 2022-02-01 ENCOUNTER — TELEPHONE (OUTPATIENT)
Dept: INTERNAL MEDICINE | Facility: CLINIC | Age: 72
End: 2022-02-01
Payer: MEDICARE

## 2022-02-03 ENCOUNTER — TELEPHONE (OUTPATIENT)
Dept: INTERNAL MEDICINE | Facility: CLINIC | Age: 72
End: 2022-02-03
Payer: MEDICARE

## 2022-02-03 NOTE — TELEPHONE ENCOUNTER
[facility]     OCCUAPTIONAL THERAPY 2x 1wk, 1x every 2wk for 2 wk, 1x 2wk     Shirlene call back number [phone]

## 2022-02-07 ENCOUNTER — TELEPHONE (OUTPATIENT)
Dept: INTERNAL MEDICINE | Facility: CLINIC | Age: 72
End: 2022-02-07
Payer: MEDICARE

## 2022-02-08 ENCOUNTER — MEDICAL CORRESPONDENCE (OUTPATIENT)
Dept: HEALTH INFORMATION MANAGEMENT | Facility: CLINIC | Age: 72
End: 2022-02-08

## 2022-02-08 DIAGNOSIS — Z53.9 DIAGNOSIS NOT YET DEFINED: Primary | ICD-10-CM

## 2022-02-08 PROCEDURE — 99207 PR MD CERTIFICATION HHA PATIENT: CPT | Performed by: INTERNAL MEDICINE

## 2022-02-11 ENCOUNTER — TELEPHONE (OUTPATIENT)
Dept: INTERNAL MEDICINE | Facility: CLINIC | Age: 72
End: 2022-02-11
Payer: MEDICARE

## 2022-02-14 ENCOUNTER — MEDICAL CORRESPONDENCE (OUTPATIENT)
Dept: HEALTH INFORMATION MANAGEMENT | Facility: CLINIC | Age: 72
End: 2022-02-14
Payer: MEDICARE

## 2022-02-23 ENCOUNTER — PATIENT OUTREACH (OUTPATIENT)
Dept: CARE COORDINATION | Facility: CLINIC | Age: 72
End: 2022-02-23
Payer: MEDICARE

## 2022-02-23 NOTE — PROGRESS NOTES
Clinic Care Coordination Contact  Care Team Conversations    2-23, CHW:    The CHW received a VM from the patient wanting to schedule a doctor's visit.     Returned call, however UTC. CHW left VM with CC contact number and the 24/7 [facility] scheduling line.    ALY Levi. [facility]  Community Health Worker  [facility]  Clinic Care Coordination  [phone]

## 2022-03-10 ENCOUNTER — TELEPHONE (OUTPATIENT)
Dept: INTERNAL MEDICINE | Facility: CLINIC | Age: 72
End: 2022-03-10
Payer: MEDICARE

## 2022-03-10 NOTE — TELEPHONE ENCOUNTER
Called Caitlyn back for more information. She states that patient has not been answering the phone or door for home visits. She has spoken to patient's daughter and patient is fine, but not interested in skilled nursing visits. Caitlyn states their plan is to see patient next week to discharge from skilled nursing.     Routed to provider to review.     Joelle Johnson RN  [facility]

## 2022-03-10 NOTE — TELEPHONE ENCOUNTER
Caitlyn from [facility] calls for verbal orders for missed Skilled Nursing Visit this week.  Will resume next week.  Ok to leave a detailed message at [phone].

## 2022-03-11 ENCOUNTER — TELEPHONE (OUTPATIENT)
Dept: INTERNAL MEDICINE | Facility: CLINIC | Age: 72
End: 2022-03-11
Payer: MEDICARE

## 2022-03-14 ENCOUNTER — MEDICAL CORRESPONDENCE (OUTPATIENT)
Dept: HEALTH INFORMATION MANAGEMENT | Facility: CLINIC | Age: 72
End: 2022-03-14
Payer: MEDICARE

## 2022-06-20 ENCOUNTER — TRANSFERRED RECORDS (OUTPATIENT)
Dept: HEALTH INFORMATION MANAGEMENT | Facility: CLINIC | Age: 72
End: 2022-06-20

## 2023-06-05 ENCOUNTER — OFFICE VISIT (OUTPATIENT)
Dept: OPHTHALMOLOGY | Facility: CLINIC | Age: 73
End: 2023-06-05
Payer: COMMERCIAL

## 2023-06-05 ENCOUNTER — TELEPHONE (OUTPATIENT)
Dept: OPHTHALMOLOGY | Facility: CLINIC | Age: 73
End: 2023-06-05

## 2023-06-05 DIAGNOSIS — H52.03 HYPEROPIA OF BOTH EYES WITH ASTIGMATISM AND PRESBYOPIA: Primary | ICD-10-CM

## 2023-06-05 DIAGNOSIS — H25.813 COMBINED FORMS OF AGE-RELATED CATARACT OF BOTH EYES: ICD-10-CM

## 2023-06-05 DIAGNOSIS — H52.203 HYPEROPIA OF BOTH EYES WITH ASTIGMATISM AND PRESBYOPIA: Primary | ICD-10-CM

## 2023-06-05 DIAGNOSIS — H52.4 HYPEROPIA OF BOTH EYES WITH ASTIGMATISM AND PRESBYOPIA: Primary | ICD-10-CM

## 2023-06-05 DIAGNOSIS — H02.834 DERMATOCHALASIS OF BOTH UPPER EYELIDS: ICD-10-CM

## 2023-06-05 DIAGNOSIS — H02.831 DERMATOCHALASIS OF BOTH UPPER EYELIDS: ICD-10-CM

## 2023-06-05 PROCEDURE — 92004 COMPRE OPH EXAM NEW PT 1/>: CPT | Performed by: OPHTHALMOLOGY

## 2023-06-05 PROCEDURE — 92015 DETERMINE REFRACTIVE STATE: CPT | Performed by: OPHTHALMOLOGY

## 2023-06-05 ASSESSMENT — CONF VISUAL FIELD
OD_INFERIOR_TEMPORAL_RESTRICTION: 0
OD_INFERIOR_NASAL_RESTRICTION: 0
OS_SUPERIOR_TEMPORAL_RESTRICTION: 0
OD_SUPERIOR_NASAL_RESTRICTION: 0
METHOD: COUNTING FINGERS
OS_SUPERIOR_NASAL_RESTRICTION: 0
OS_INFERIOR_NASAL_RESTRICTION: 0
OS_NORMAL: 1
OD_NORMAL: 1
OS_INFERIOR_TEMPORAL_RESTRICTION: 0
OD_SUPERIOR_TEMPORAL_RESTRICTION: 0

## 2023-06-05 ASSESSMENT — REFRACTION_WEARINGRX
OD_CYLINDER: +0.75
OS_AXIS: 016
OD_SPHERE: +1.00
OS_CYLINDER: +1.00
OD_ADD: +2.50
OD_AXIS: 159
OS_ADD: +2.50
SPECS_TYPE: PAL
OS_SPHERE: +0.75

## 2023-06-05 ASSESSMENT — TONOMETRY
IOP_METHOD: ICARE
OD_IOP_MMHG: 12
OS_IOP_MMHG: 14

## 2023-06-05 ASSESSMENT — CUP TO DISC RATIO
OD_RATIO: 0.3
OS_RATIO: 0.4

## 2023-06-05 ASSESSMENT — REFRACTION_MANIFEST
OD_SPHERE: +1.50
OS_CYLINDER: +1.00
OD_AXIS: 145
OS_ADD: +2.50
OD_ADD: +2.50
OS_SPHERE: +0.75
OS_AXIS: 155
OD_CYLINDER: +0.75

## 2023-06-05 ASSESSMENT — VISUAL ACUITY
OD_CC: 20/20
OS_CC+: -1
OS_CC: 20/25
CORRECTION_TYPE: GLASSES
METHOD: SNELLEN - LINEAR

## 2023-06-05 ASSESSMENT — SLIT LAMP EXAM - LIDS
COMMENTS: 2+ DERMATOCHALASIS
COMMENTS: 2+ DERMATOCHALASIS

## 2023-06-05 NOTE — PROGRESS NOTES
HPI    Patient here for exam, states last exam was about 10 years ago. Feels she could use updated glasses rx. No drop use.     denies family history of ocular conditions   denies history of ocular surgeries    Here with granddaughter    Last edited by Mike Mckoy MD on 6/5/2023  3:07 PM.         Review of systems for the eyes was negative other than the pertinent positives/negatives listed in the HPI.      Assessment & Plan    HPI:  Rolanda Molina is a 72 year old female with history of hyperopia with astigmatism and presbyopia who presents for an eye exam. Her last exam was ~10 years ago. Her glasses are scratched but otherwise doing well. She does not drive    POHx: hyperopia with astigmatism and presbyopia  PMHx: denies  Current Medications: aspirin (ASA) 325 MG EC tablet, Take 1 tablet (325 mg) by mouth daily    No current facility-administered medications on file prior to visit.    FHx: denies family history of ocular conditions   PSHx: denies history of ocular surgeries       Current Eye Medications:      Assessment & Plan:  (H52.03,  H52.203,  H52.4) Hyperopia of both eyes with astigmatism and presbyopia  (primary encounter diagnosis)  Patient has minimal change in hyperopia but a copy of today's glasses prescription was given.  The patient may wish to update the glasses if the lenses are scratched or the frames are too small.  Presbyopia is difficulty seeing up close and is treated with bifocals or over the counter reading glasses      (H25.813) Combined forms of age-related cataract of both eyes  Mild cataracts are present and may account for some of the patient's visual complaints. No treatment currently recommended. The patient will monitor for vision changes and contact us with any decrease in vision. Recheck in one year.     (H02.831,  H02.834) Dermatochalasis of both upper eyelids  Observe for now      Return in about 1 year (around 6/5/2024) for Annual Visit.        Mike WEST  MD Ean     Attending Physician Attestation:  Complete documentation of historical and exam elements from today's encounter can be found in the full encounter summary report (not reduplicated in this progress note).  I personally obtained the chief complaint(s) and history of present illness.  I confirmed and edited as necessary the review of systems, past medical/surgical history, family history, social history, and examination findings as documented by others; and I examined the patient myself.  I personally reviewed the relevant tests, images, and reports as documented above.  I formulated and edited as necessary the assessment and plan and discussed the findings and management plan with the patient and family. - Mike Mckoy MD

## 2023-06-05 NOTE — NURSING NOTE
Chief Complaints and History of Present Illnesses   Patient presents with     Annual Eye Exam       Chief Complaint(s) and History of Present Illness(es)     Annual Eye Exam           Comments    Patient here for exam, states last exam was about 10 years ago. Feels she could use updated glasses rx. No drop use.                  Kelsi Harris, COT

## 2023-06-05 NOTE — TELEPHONE ENCOUNTER
,Left Voicemail (1st Attempt) for the patient to call back and schedule the following:    Appointment type: return  Provider: dr. wick  Return date: 6/5/2024  Specialty phone number: [phone]   Additonal Notes: Return in about 1 year (around 6/5/2024) for Annual Visit.    Brielle joshua Procedure   Orthopedics, Podiatry, Sports Medicine, Ent ,Eye , Audiology, Adult Endocrine & Diabetes, Nutrition & Medication Therapy Management Specialties   [facility]

## 2023-06-06 ENCOUNTER — PATIENT OUTREACH (OUTPATIENT)
Dept: CARE COORDINATION | Facility: CLINIC | Age: 73
End: 2023-06-06
Payer: COMMERCIAL

## 2023-12-05 ENCOUNTER — TELEPHONE (OUTPATIENT)
Dept: OPHTHALMOLOGY | Facility: CLINIC | Age: 73
End: 2023-12-05
Payer: COMMERCIAL

## 2023-12-05 NOTE — TELEPHONE ENCOUNTER
Left Voicemail (2nd Attempt) for the patient to call back and schedule the following:    Appointment type: return  Provider: dr. Mckoy   Return date: 6/5/2024  Specialty phone number: [phone]   Additonal Notes: : Return in about 1 year (around 6/5/2024) for Annual Visit. [     Brielle joshua Procedure   Orthopedics, Podiatry, Sports Medicine, Ent ,Eye , Audiology, Adult Endocrine & Diabetes, Nutrition & Medication Therapy Management Specialties   [facility]

## 2024-10-07 ENCOUNTER — HOSPITAL ENCOUNTER (EMERGENCY)
Facility: CLINIC | Age: 74
Discharge: HOME | End: 2024-10-07
Attending: EMERGENCY MEDICINE | Admitting: EMERGENCY MEDICINE
Payer: COMMERCIAL

## 2024-10-07 VITALS
HEIGHT: 67 IN | HEART RATE: 112 BPM | WEIGHT: 150 LBS | TEMPERATURE: 97.6 F | OXYGEN SATURATION: 99 % | DIASTOLIC BLOOD PRESSURE: 108 MMHG | BODY MASS INDEX: 23.54 KG/M2 | RESPIRATION RATE: 16 BRPM | SYSTOLIC BLOOD PRESSURE: 162 MMHG

## 2024-10-07 DIAGNOSIS — H10.31 ACUTE BACTERIAL CONJUNCTIVITIS OF RIGHT EYE: ICD-10-CM

## 2024-10-07 LAB
ALBUMIN SERPL BCG-MCNC: 4.6 G/DL (ref 3.5–5.2)
ALP SERPL-CCNC: 110 U/L (ref 40–150)
ALT SERPL W P-5'-P-CCNC: 11 U/L (ref 0–50)
ANION GAP SERPL CALCULATED.3IONS-SCNC: 14 MMOL/L (ref 7–15)
AST SERPL W P-5'-P-CCNC: 21 U/L (ref 0–45)
ATRIAL RATE - MUSE: 103 BPM
BASOPHILS # BLD AUTO: 0 10E3/UL (ref 0–0.2)
BASOPHILS NFR BLD AUTO: 1 %
BILIRUB SERPL-MCNC: 0.5 MG/DL
BUN SERPL-MCNC: 12.4 MG/DL (ref 8–23)
CALCIUM SERPL-MCNC: 9.8 MG/DL (ref 8.8–10.4)
CHLORIDE SERPL-SCNC: 99 MMOL/L (ref 98–107)
CREAT SERPL-MCNC: 1.2 MG/DL (ref 0.51–0.95)
DIASTOLIC BLOOD PRESSURE - MUSE: NORMAL MMHG
EGFRCR SERPLBLD CKD-EPI 2021: 47 ML/MIN/1.73M2
EOSINOPHIL # BLD AUTO: 0 10E3/UL (ref 0–0.7)
EOSINOPHIL NFR BLD AUTO: 1 %
ERYTHROCYTE [DISTWIDTH] IN BLOOD BY AUTOMATED COUNT: 15 % (ref 10–15)
FLUAV RNA SPEC QL NAA+PROBE: NEGATIVE
FLUBV RNA RESP QL NAA+PROBE: NEGATIVE
GLUCOSE SERPL-MCNC: 112 MG/DL (ref 70–99)
HCO3 SERPL-SCNC: 26 MMOL/L (ref 22–29)
HCT VFR BLD AUTO: 47.1 % (ref 35–47)
HGB BLD-MCNC: 15.1 G/DL (ref 11.7–15.7)
IMM GRANULOCYTES # BLD: 0.1 10E3/UL
IMM GRANULOCYTES NFR BLD: 1 %
INTERPRETATION ECG - MUSE: NORMAL
LYMPHOCYTES # BLD AUTO: 2.9 10E3/UL (ref 0.8–5.3)
LYMPHOCYTES NFR BLD AUTO: 38 %
MCH RBC QN AUTO: 25.5 PG (ref 26.5–33)
MCHC RBC AUTO-ENTMCNC: 32.1 G/DL (ref 31.5–36.5)
MCV RBC AUTO: 79 FL (ref 78–100)
MONOCYTES # BLD AUTO: 0.7 10E3/UL (ref 0–1.3)
MONOCYTES NFR BLD AUTO: 9 %
NEUTROPHILS # BLD AUTO: 4 10E3/UL (ref 1.6–8.3)
NEUTROPHILS NFR BLD AUTO: 52 %
NRBC # BLD AUTO: 0 10E3/UL
NRBC BLD AUTO-RTO: 0 /100
P AXIS - MUSE: 45 DEGREES
PLATELET # BLD AUTO: 246 10E3/UL (ref 150–450)
POTASSIUM SERPL-SCNC: 3.8 MMOL/L (ref 3.4–5.3)
PR INTERVAL - MUSE: 146 MS
PROT SERPL-MCNC: 8.3 G/DL (ref 6.4–8.3)
QRS DURATION - MUSE: 66 MS
QT - MUSE: 338 MS
QTC - MUSE: 442 MS
R AXIS - MUSE: -19 DEGREES
RBC # BLD AUTO: 5.93 10E6/UL (ref 3.8–5.2)
RSV RNA SPEC NAA+PROBE: NEGATIVE
SARS-COV-2 RNA RESP QL NAA+PROBE: NEGATIVE
SODIUM SERPL-SCNC: 139 MMOL/L (ref 135–145)
SYSTOLIC BLOOD PRESSURE - MUSE: NORMAL MMHG
T AXIS - MUSE: 28 DEGREES
TROPONIN T SERPL HS-MCNC: <6 NG/L
VENTRICULAR RATE- MUSE: 103 BPM
WBC # BLD AUTO: 7.7 10E3/UL (ref 4–11)

## 2024-10-07 PROCEDURE — 87637 SARSCOV2&INF A&B&RSV AMP PRB: CPT | Performed by: EMERGENCY MEDICINE

## 2024-10-07 PROCEDURE — 82947 ASSAY GLUCOSE BLOOD QUANT: CPT | Performed by: EMERGENCY MEDICINE

## 2024-10-07 PROCEDURE — 99284 EMERGENCY DEPT VISIT MOD MDM: CPT | Performed by: EMERGENCY MEDICINE

## 2024-10-07 PROCEDURE — 36415 COLL VENOUS BLD VENIPUNCTURE: CPT | Performed by: EMERGENCY MEDICINE

## 2024-10-07 PROCEDURE — 85025 COMPLETE CBC W/AUTO DIFF WBC: CPT | Performed by: EMERGENCY MEDICINE

## 2024-10-07 PROCEDURE — 84484 ASSAY OF TROPONIN QUANT: CPT | Performed by: EMERGENCY MEDICINE

## 2024-10-07 PROCEDURE — 93005 ELECTROCARDIOGRAM TRACING: CPT | Performed by: EMERGENCY MEDICINE

## 2024-10-07 RX ORDER — POLYMYXIN B SULFATE AND TRIMETHOPRIM 1; 10000 MG/ML; [USP'U]/ML
1-2 SOLUTION OPHTHALMIC EVERY 4 HOURS
Qty: 10 ML | Refills: 0 | Status: SHIPPED | OUTPATIENT
Start: 2024-10-07

## 2024-10-07 ASSESSMENT — VISUAL ACUITY
OS: 20/50
OD: 20/40

## 2024-10-07 ASSESSMENT — COLUMBIA-SUICIDE SEVERITY RATING SCALE - C-SSRS
1. IN THE PAST MONTH, HAVE YOU WISHED YOU WERE DEAD OR WISHED YOU COULD GO TO SLEEP AND NOT WAKE UP?: NO
6. HAVE YOU EVER DONE ANYTHING, STARTED TO DO ANYTHING, OR PREPARED TO DO ANYTHING TO END YOUR LIFE?: NO
2. HAVE YOU ACTUALLY HAD ANY THOUGHTS OF KILLING YOURSELF IN THE PAST MONTH?: NO

## 2024-10-07 ASSESSMENT — ACTIVITIES OF DAILY LIVING (ADL)
ADLS_ACUITY_SCORE: 38

## 2024-10-07 NOTE — DISCHARGE INSTRUCTIONS
Please make an appointment to follow up with [facility] (phone: [phone]) in 7-10 days.

## 2024-10-07 NOTE — ED TRIAGE NOTES
Patient presents with her granddaughter. Patient uses a roller walker at baseline. Patient reports body weakness and poor appetite. Patient reports redness and swelling in the right eye. Granddaughter reports the last time she had these symptoms she had COVID.

## 2024-10-07 NOTE — ED PROVIDER NOTES
"ED Provider Note  [facility]      History     Chief Complaint   Patient presents with    Flu Symptoms     Right eye reddened and sore, generalized weakness, and poor appetite. Granddaughter reports last time she had these symptoms it was COVID.      HPI  Rolanda Molina is a 74 year old female with a history of hypertension who presents to the emergency department with her granddaughter with flulike symptoms.  Patient reports that her right eye has reddened and become sore.  She also endorses generalized weakness and poor appetite.  Her granddaughter reports the last time she had the symptoms she was found to have COVID.    Past Medical History  Past Medical History:   Diagnosis Date    Hypertension      Past Surgical History:   Procedure Laterality Date    COLONOSCOPY N/A 8/30/2021    Procedure: Colonoscopy, With Polypectomy And Biopsy;  Surgeon: Federico Vital MD;  Location:  GI     aspirin (ASA) 325 MG EC tablet      Allergies   Allergen Reactions    Penicillins      Swelling of arms and legs     Family History  Family History   Problem Relation Age of Onset    Diabetes Mother         Type II    Thyroid Cancer Mother     Colon Cancer No family hx of      Social History   Social History     Tobacco Use    Smoking status: Every Day    Smokeless tobacco: Never    Tobacco comments:     starting smoking at age 26   Substance Use Topics    Alcohol use: Yes    Drug use: Never      A medically appropriate review of systems was performed with pertinent positives and negatives noted in the HPI, and all other systems negative.    Physical Exam   Height: 167.6 cm (5' 6\")  Weight: 68 kg (150 lb)  Physical Exam  Constitutional:       General: She is not in acute distress.     Appearance: Normal appearance. She is not diaphoretic.   HENT:      Head: Atraumatic.      Mouth/Throat:      Mouth: Mucous membranes are moist.   Eyes:      General: No scleral icterus.     Conjunctiva/sclera: " Conjunctivae normal.   Cardiovascular:      Rate and Rhythm: Normal rate.      Heart sounds: Normal heart sounds.   Pulmonary:      Effort: No respiratory distress.      Breath sounds: Normal breath sounds.   Abdominal:      General: Abdomen is flat.   Musculoskeletal:      Cervical back: Neck supple.   Skin:     General: Skin is warm.      Findings: No rash.   Neurological:      Mental Status: She is alert.             ED Course, Procedures, & Data      Procedures            No results found for any visits on 10/07/24.  Medications - No data to display  Labs Ordered and Resulted from Time of ED Arrival to Time of ED Departure - No data to display  No orders to display          Critical care was not performed.     Medical Decision Making  The patient's presentation was of high complexity (an acute health issue posing potential threat to life or bodily function).    The patient's evaluation involved:  ordering and/or review of 3+ test(s) in this encounter (see separate area of note for details)    The patient's management necessitated moderate risk (prescription drug management including medications given in the ED).    Assessment & Plan      74 year old female with a history of hypertension who presents to the emergency department with her granddaughter with flulike symptoms.  Vitals are stable and afebrile including normal pulse ox and 99% on room air.  IV established, labs sent which revealed normal CBC electrolytes, negative troponin and negative influenza and COVID swab.  EKG obtained which revealed normal sinus rhythm without acute ischemic changes.  Patient discharged home with prescription for antibiotic drops and plan to follow-up through primary care referral center for further evaluation care.    I have reviewed the nursing notes. I have reviewed the findings, diagnosis, plan and need for follow up with the patient.    New Prescriptions    No medications on file       Final diagnoses:   Acute bacterial  conjunctivitis of right eye       Ahsan Zhang MD  [facility] EMERGENCY DEPARTMENT  10/7/2024     Leatha, Ahsan KAYE MD  10/09/24 3598

## 2024-10-18 ENCOUNTER — HOSPITAL ENCOUNTER (INPATIENT)
Facility: CLINIC | Age: 74
LOS: 6 days | Discharge: NURSING, REHAB OR LONG-TERM CARE | DRG: 124 | End: 2024-10-24
Attending: EMERGENCY MEDICINE | Admitting: INTERNAL MEDICINE
Payer: COMMERCIAL

## 2024-10-18 ENCOUNTER — APPOINTMENT (OUTPATIENT)
Dept: GENERAL RADIOLOGY | Facility: CLINIC | Age: 74
DRG: 124 | End: 2024-10-18
Attending: EMERGENCY MEDICINE
Payer: COMMERCIAL

## 2024-10-18 ENCOUNTER — APPOINTMENT (OUTPATIENT)
Dept: CT IMAGING | Facility: CLINIC | Age: 74
DRG: 124 | End: 2024-10-18
Attending: EMERGENCY MEDICINE
Payer: COMMERCIAL

## 2024-10-18 DIAGNOSIS — U07.1 INFECTION DUE TO 2019 NOVEL CORONAVIRUS: ICD-10-CM

## 2024-10-18 DIAGNOSIS — I67.1 CEREBRAL ANEURYSM, NONRUPTURED: ICD-10-CM

## 2024-10-18 DIAGNOSIS — M62.81 GENERALIZED MUSCLE WEAKNESS: ICD-10-CM

## 2024-10-18 DIAGNOSIS — Z11.52 ENCOUNTER FOR SCREENING FOR COVID-19: Primary | ICD-10-CM

## 2024-10-18 DIAGNOSIS — H40.89 OTHER GLAUCOMA OF RIGHT EYE: ICD-10-CM

## 2024-10-18 DIAGNOSIS — R10.13 DYSPEPSIA: ICD-10-CM

## 2024-10-18 DIAGNOSIS — I63.19 CEREBROVASCULAR ACCIDENT (CVA) DUE TO EMBOLISM OF OTHER PRECEREBRAL ARTERY (H): ICD-10-CM

## 2024-10-18 DIAGNOSIS — I10 HYPERTENSION, UNSPECIFIED TYPE: ICD-10-CM
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
## 2024-10-18 DIAGNOSIS — H57.89 REDNESS OF RIGHT EYE: ICD-10-CM

## 2024-10-18 DIAGNOSIS — N17.9 AKI (ACUTE KIDNEY INJURY) (H): ICD-10-CM

## 2024-10-18 DIAGNOSIS — R41.0 DELIRIUM: ICD-10-CM

## 2024-10-18 LAB
ALBUMIN SERPL BCG-MCNC: 4.9 G/DL (ref 3.5–5.2)
ALBUMIN UR-MCNC: NEGATIVE MG/DL
ALP SERPL-CCNC: 115 U/L (ref 40–150)
ALT SERPL W P-5'-P-CCNC: 11 U/L (ref 0–50)
ANION GAP SERPL CALCULATED.3IONS-SCNC: 15 MMOL/L (ref 7–15)
APPEARANCE UR: ABNORMAL
AST SERPL W P-5'-P-CCNC: 15 U/L (ref 0–45)
BACTERIA #/AREA URNS HPF: ABNORMAL /HPF
BASOPHILS # BLD AUTO: 0 10E3/UL (ref 0–0.2)
BASOPHILS NFR BLD AUTO: 0 %
BILIRUB SERPL-MCNC: 0.4 MG/DL
BILIRUB UR QL STRIP: NEGATIVE
BUN SERPL-MCNC: 7.5 MG/DL (ref 8–23)
CALCIUM SERPL-MCNC: 10.5 MG/DL (ref 8.8–10.4)
CHLORIDE SERPL-SCNC: 99 MMOL/L (ref 98–107)
COLOR UR AUTO: ABNORMAL
CREAT SERPL-MCNC: 0.84 MG/DL (ref 0.51–0.95)
EGFRCR SERPLBLD CKD-EPI 2021: 72 ML/MIN/1.73M2
EOSINOPHIL # BLD AUTO: 0 10E3/UL (ref 0–0.7)
EOSINOPHIL NFR BLD AUTO: 0 %
ERYTHROCYTE [DISTWIDTH] IN BLOOD BY AUTOMATED COUNT: 15.2 % (ref 10–15)
FLUAV RNA SPEC QL NAA+PROBE: NEGATIVE
FLUBV RNA RESP QL NAA+PROBE: NEGATIVE
GLUCOSE SERPL-MCNC: 118 MG/DL (ref 70–99)
GLUCOSE UR STRIP-MCNC: NEGATIVE MG/DL
HCO3 SERPL-SCNC: 25 MMOL/L (ref 22–29)
HCT VFR BLD AUTO: 49.1 % (ref 35–47)
HGB BLD-MCNC: 15.2 G/DL (ref 11.7–15.7)
HGB UR QL STRIP: NEGATIVE
IMM GRANULOCYTES # BLD: 0 10E3/UL
IMM GRANULOCYTES NFR BLD: 0 %
KETONES UR STRIP-MCNC: NEGATIVE MG/DL
LACTATE SERPL-SCNC: 1.4 MMOL/L (ref 0.7–2)
LACTATE SERPL-SCNC: 2.4 MMOL/L (ref 0.7–2)
LEUKOCYTE ESTERASE UR QL STRIP: NEGATIVE
LYMPHOCYTES # BLD AUTO: 1.7 10E3/UL (ref 0.8–5.3)
LYMPHOCYTES NFR BLD AUTO: 24 %
MCH RBC QN AUTO: 25 PG (ref 26.5–33)
MCHC RBC AUTO-ENTMCNC: 31 G/DL (ref 31.5–36.5)
MCV RBC AUTO: 81 FL (ref 78–100)
MONOCYTES # BLD AUTO: 0.4 10E3/UL (ref 0–1.3)
MONOCYTES NFR BLD AUTO: 6 %
MUCOUS THREADS #/AREA URNS LPF: PRESENT /LPF
NEUTROPHILS # BLD AUTO: 5 10E3/UL (ref 1.6–8.3)
NEUTROPHILS NFR BLD AUTO: 70 %
NITRATE UR QL: NEGATIVE
NRBC # BLD AUTO: 0 10E3/UL
NRBC BLD AUTO-RTO: 0 /100
PH UR STRIP: 7 [PH] (ref 5–7)
PLATELET # BLD AUTO: 206 10E3/UL (ref 150–450)
POTASSIUM SERPL-SCNC: 4 MMOL/L (ref 3.4–5.3)
PROCALCITONIN SERPL IA-MCNC: 0.04 NG/ML
PROT SERPL-MCNC: 9.5 G/DL (ref 6.4–8.3)
RBC # BLD AUTO: 6.09 10E6/UL (ref 3.8–5.2)
RBC URINE: 1 /HPF
RSV RNA SPEC NAA+PROBE: NEGATIVE
SARS-COV-2 RNA RESP QL NAA+PROBE: NEGATIVE
SODIUM SERPL-SCNC: 139 MMOL/L (ref 135–145)
SP GR UR STRIP: 1.01 (ref 1–1.03)
SQUAMOUS EPITHELIAL: 1 /HPF
UROBILINOGEN UR STRIP-MCNC: NORMAL MG/DL
WBC # BLD AUTO: 7.1 10E3/UL (ref 4–11)
WBC URINE: 6 /HPF

## 2024-10-18 PROCEDURE — 99285 EMERGENCY DEPT VISIT HI MDM: CPT | Mod: 25 | Performed by: EMERGENCY MEDICINE

## 2024-10-18 PROCEDURE — 81001 URINALYSIS AUTO W/SCOPE: CPT | Performed by: EMERGENCY MEDICINE

## 2024-10-18 PROCEDURE — 250N000013 HC RX MED GY IP 250 OP 250 PS 637

## 2024-10-18 PROCEDURE — 120N000002 HC R&B MED SURG/OB UMMC

## 2024-10-18 PROCEDURE — 99222 1ST HOSP IP/OBS MODERATE 55: CPT | Mod: 4UV | Performed by: OPHTHALMOLOGY

## 2024-10-18 PROCEDURE — 85025 COMPLETE CBC W/AUTO DIFF WBC: CPT | Performed by: EMERGENCY MEDICINE

## 2024-10-18 PROCEDURE — 250N000011 HC RX IP 250 OP 636

## 2024-10-18 PROCEDURE — 71046 X-RAY EXAM CHEST 2 VIEWS: CPT

## 2024-10-18 PROCEDURE — 84145 PROCALCITONIN (PCT): CPT | Performed by: EMERGENCY MEDICINE

## 2024-10-18 PROCEDURE — 36415 COLL VENOUS BLD VENIPUNCTURE: CPT | Performed by: EMERGENCY MEDICINE

## 2024-10-18 PROCEDURE — 250N000013 HC RX MED GY IP 250 OP 250 PS 637: Performed by: EMERGENCY MEDICINE

## 2024-10-18 PROCEDURE — 70450 CT HEAD/BRAIN W/O DYE: CPT

## 2024-10-18 PROCEDURE — 250N000013 HC RX MED GY IP 250 OP 250 PS 637: Performed by: INTERNAL MEDICINE

## 2024-10-18 PROCEDURE — 96374 THER/PROPH/DIAG INJ IV PUSH: CPT | Performed by: EMERGENCY MEDICINE

## 2024-10-18 PROCEDURE — 250N000009 HC RX 250

## 2024-10-18 PROCEDURE — 99222 1ST HOSP IP/OBS MODERATE 55: CPT | Performed by: INTERNAL MEDICINE

## 2024-10-18 PROCEDURE — 80053 COMPREHEN METABOLIC PANEL: CPT | Performed by: EMERGENCY MEDICINE

## 2024-10-18 PROCEDURE — G0378 HOSPITAL OBSERVATION PER HR: HCPCS

## 2024-10-18 PROCEDURE — 83605 ASSAY OF LACTIC ACID: CPT | Performed by: EMERGENCY MEDICINE

## 2024-10-18 PROCEDURE — 99285 EMERGENCY DEPT VISIT HI MDM: CPT | Performed by: EMERGENCY MEDICINE

## 2024-10-18 PROCEDURE — 87637 SARSCOV2&INF A&B&RSV AMP PRB: CPT | Performed by: EMERGENCY MEDICINE

## 2024-10-18 RX ORDER — HYDRALAZINE HYDROCHLORIDE 10 MG/1
10 TABLET, FILM COATED ORAL EVERY 4 HOURS PRN
Status: DISCONTINUED | OUTPATIENT
Start: 2024-10-18 | End: 2024-10-24 | Stop reason: HOSPADM

## 2024-10-18 RX ORDER — LOSARTAN POTASSIUM 25 MG/1
25 TABLET ORAL DAILY
Status: DISCONTINUED | OUTPATIENT
Start: 2024-10-18 | End: 2024-10-20

## 2024-10-18 RX ORDER — DORZOLAMIDE HYDROCHLORIDE AND TIMOLOL MALEATE 20; 5 MG/ML; MG/ML
1 SOLUTION/ DROPS OPHTHALMIC 2 TIMES DAILY
Status: DISCONTINUED | OUTPATIENT
Start: 2024-10-18 | End: 2024-10-18

## 2024-10-18 RX ORDER — AMOXICILLIN 250 MG
1 CAPSULE ORAL 2 TIMES DAILY PRN
Status: DISCONTINUED | OUTPATIENT
Start: 2024-10-18 | End: 2024-10-24 | Stop reason: HOSPADM

## 2024-10-18 RX ORDER — ACETAZOLAMIDE 250 MG/1
250 TABLET ORAL
Status: DISCONTINUED | OUTPATIENT
Start: 2024-10-19 | End: 2024-10-22

## 2024-10-18 RX ORDER — ONDANSETRON 2 MG/ML
4 INJECTION INTRAMUSCULAR; INTRAVENOUS EVERY 6 HOURS PRN
Status: DISCONTINUED | OUTPATIENT
Start: 2024-10-18 | End: 2024-10-24 | Stop reason: HOSPADM

## 2024-10-18 RX ORDER — CALCIUM CARBONATE 750 MG/1
750 TABLET, CHEWABLE ORAL DAILY
COMMUNITY
End: 2024-10-18

## 2024-10-18 RX ORDER — BRIMONIDINE TARTRATE 2 MG/ML
1 SOLUTION/ DROPS OPHTHALMIC 3 TIMES DAILY
Status: DISCONTINUED | OUTPATIENT
Start: 2024-10-18 | End: 2024-10-18

## 2024-10-18 RX ORDER — BRIMONIDINE TARTRATE 2 MG/ML
1 SOLUTION/ DROPS OPHTHALMIC 3 TIMES DAILY
Status: DISCONTINUED | OUTPATIENT
Start: 2024-10-19 | End: 2024-10-24 | Stop reason: HOSPADM

## 2024-10-18 RX ORDER — OMEGA-3 FATTY ACIDS/FISH OIL 300-1000MG
400 CAPSULE ORAL EVERY 6 HOURS PRN
COMMUNITY

## 2024-10-18 RX ORDER — AMOXICILLIN 250 MG
2 CAPSULE ORAL 2 TIMES DAILY PRN
Status: DISCONTINUED | OUTPATIENT
Start: 2024-10-18 | End: 2024-10-24 | Stop reason: HOSPADM

## 2024-10-18 RX ORDER — PROPARACAINE HYDROCHLORIDE 5 MG/ML
2 SOLUTION/ DROPS OPHTHALMIC ONCE
Status: COMPLETED | OUTPATIENT
Start: 2024-10-18 | End: 2024-10-18

## 2024-10-18 RX ORDER — DORZOLAMIDE HYDROCHLORIDE AND TIMOLOL MALEATE 20; 5 MG/ML; MG/ML
1 SOLUTION/ DROPS OPHTHALMIC 2 TIMES DAILY
Status: DISCONTINUED | OUTPATIENT
Start: 2024-10-19 | End: 2024-10-24 | Stop reason: HOSPADM

## 2024-10-18 RX ORDER — LATANOPROST 50 UG/ML
1 SOLUTION/ DROPS OPHTHALMIC AT BEDTIME
Status: DISCONTINUED | OUTPATIENT
Start: 2024-10-18 | End: 2024-10-18

## 2024-10-18 RX ORDER — ONDANSETRON 4 MG/1
4 TABLET, ORALLY DISINTEGRATING ORAL EVERY 6 HOURS PRN
Status: DISCONTINUED | OUTPATIENT
Start: 2024-10-18 | End: 2024-10-24 | Stop reason: HOSPADM

## 2024-10-18 RX ORDER — ACETAMINOPHEN 500 MG
1000 TABLET ORAL ONCE
Status: COMPLETED | OUTPATIENT
Start: 2024-10-18 | End: 2024-10-18

## 2024-10-18 RX ORDER — HYDRALAZINE HYDROCHLORIDE 20 MG/ML
10 INJECTION INTRAMUSCULAR; INTRAVENOUS EVERY 4 HOURS PRN
Status: DISCONTINUED | OUTPATIENT
Start: 2024-10-18 | End: 2024-10-24 | Stop reason: HOSPADM

## 2024-10-18 RX ORDER — ACETAZOLAMIDE 500 MG/5ML
500 INJECTION, POWDER, LYOPHILIZED, FOR SOLUTION INTRAVENOUS ONCE
Status: COMPLETED | OUTPATIENT
Start: 2024-10-18 | End: 2024-10-18

## 2024-10-18 RX ORDER — LIDOCAINE 40 MG/G
CREAM TOPICAL
Status: DISCONTINUED | OUTPATIENT
Start: 2024-10-18 | End: 2024-10-24 | Stop reason: HOSPADM

## 2024-10-18 RX ORDER — LATANOPROST 50 UG/ML
1 SOLUTION/ DROPS OPHTHALMIC AT BEDTIME
Status: DISCONTINUED | OUTPATIENT
Start: 2024-10-19 | End: 2024-10-24 | Stop reason: HOSPADM

## 2024-10-18 RX ORDER — CALCIUM CARBONATE 500 MG/1
1000 TABLET, CHEWABLE ORAL 4 TIMES DAILY PRN
Status: DISCONTINUED | OUTPATIENT
Start: 2024-10-18 | End: 2024-10-24 | Stop reason: HOSPADM

## 2024-10-18 RX ADMIN — BRIMONIDINE TARTRATE 1 DROP: 2 SOLUTION/ DROPS OPHTHALMIC at 15:34

## 2024-10-18 RX ADMIN — BRIMONIDINE TARTRATE 1 DROP: 2 SOLUTION/ DROPS OPHTHALMIC at 22:49

## 2024-10-18 RX ADMIN — ACETAZOLAMIDE 500 MG: 500 INJECTION, POWDER, LYOPHILIZED, FOR SOLUTION INTRAVENOUS at 15:00

## 2024-10-18 RX ADMIN — LOSARTAN POTASSIUM 25 MG: 25 TABLET, FILM COATED ORAL at 16:50

## 2024-10-18 RX ADMIN — DORZOLAMIDE HYDROCHLORIDE AND TIMOLOL MALEATE 1 DROP: 20; 5 SOLUTION OPHTHALMIC at 22:49

## 2024-10-18 RX ADMIN — ACETAMINOPHEN 1000 MG: 500 TABLET ORAL at 10:49

## 2024-10-18 RX ADMIN — LATANOPROST 1 DROP: 50 SOLUTION OPHTHALMIC at 22:51

## 2024-10-18 ASSESSMENT — ACTIVITIES OF DAILY LIVING (ADL)
ADLS_ACUITY_SCORE: 23
ADLS_ACUITY_SCORE: 23
ADLS_ACUITY_SCORE: 38
ADLS_ACUITY_SCORE: 23
ADLS_ACUITY_SCORE: 38
ADLS_ACUITY_SCORE: 38
ADLS_ACUITY_SCORE: 23
ADLS_ACUITY_SCORE: 23
ADLS_ACUITY_SCORE: 38
ADLS_ACUITY_SCORE: 38
ADLS_ACUITY_SCORE: 23
ADLS_ACUITY_SCORE: 38

## 2024-10-18 ASSESSMENT — COLUMBIA-SUICIDE SEVERITY RATING SCALE - C-SSRS
6. HAVE YOU EVER DONE ANYTHING, STARTED TO DO ANYTHING, OR PREPARED TO DO ANYTHING TO END YOUR LIFE?: NO
2. HAVE YOU ACTUALLY HAD ANY THOUGHTS OF KILLING YOURSELF IN THE PAST MONTH?: NO
1. IN THE PAST MONTH, HAVE YOU WISHED YOU WERE DEAD OR WISHED YOU COULD GO TO SLEEP AND NOT WAKE UP?: NO

## 2024-10-18 NOTE — ED TRIAGE NOTES
Right eye redness, drainage for over a week. Pt seen last week and using drops that do not seem to help. Pt C/O tooth ache pain (does not wear her dentures      Triage Assessment (Adult)       Row Name 10/18/24 1006          Triage Assessment    Airway WDL WDL        Respiratory WDL    Respiratory WDL WDL        Skin Circulation/Temperature WDL    Skin Circulation/Temperature WDL WDL        Cardiac WDL    Cardiac WDL WDL        Peripheral/Neurovascular WDL    Peripheral Neurovascular WDL WDL        Cognitive/Neuro/Behavioral WDL    Cognitive/Neuro/Behavioral WDL WDL

## 2024-10-18 NOTE — PROGRESS NOTES
8A Admission Note    Reason for admission: Hypertension, R eye pain/blindness  Primary team notified of pt arrival.  [place] [facility] [place]ia: [place]   Accompanied by: Daughter  Belongings: Placed in closet; valuables sent home with family  Admission Required Doc Completed: Yes  Teaching: Orientation to unit and call light- call light within reach, use of console, meal times, when to call for the RN, and enforced importance of safety.  IV Access: Yes  Telemetry: No  Ht./Wt.: Completed  Code Status verified on armband: Yes  2 RN Skin Assessment Completed with: Saniya RN  Suction/Ambu bag/Flowmeter at bedside: Yes/No    Pt status:     Temp:  [98.1  F (36.7  C)-98.6  F (37  C)] 98.6  F (37  C)  Pulse:  [64-72] 72  BP: (172-197)/() 172/115  SpO2:  [99 %-100 %] 99 %

## 2024-10-18 NOTE — H&P
"[facility]    History and Physical - Hospitalist Service, GOLD TEAM        Date of Admission:  10/18/2024    Assessment & Plan      This is a very kind 74-year-old -American female presented to the [facility] [place] with right eye redness and light sensitivity.  Patient's daughter is present at bedside and reports that patient is \"not acting right\" and is confused.  Patient has a pertinent chronic past medical history positive for hypertension.  No PTA antihypertensive regimen identified via medication reconciliation.  Upon evaluation in the emergency department patient's systolic blood pressure was found to be over 200 mmHg.  Patient answers questions appropriately.  Patient reports her right eye vision is blurry.  Ophthalmology is actively evaluating patient and providing recommendations.  Of note, CT head demonstrates ventriculomegaly with effacement of cerebral sulci.  This may represent normal pressure hydrocephalus in the appropriate clinical setting.  Patient will be admitted to the internal medicine hospitalist service for further evaluation and management.    # Right eye redness  # Blurry vision  # Photosensitivity  # Consider glaucoma  - Ophthalmology evaluating patient; very much appreciate recommendations    # Hypertensive emergency  - Hydralazine IV as needed systolic blood pressure greater than 180 mmHg  - Add losartan 25 mg p.o. daily  - Continue to closely monitor blood pressure  - Make further medication adjustments as necessary    # Delirium  # Ventriculomegaly with effacement of cerebral sulci  # Consider normal pressure hydrocephalus  - Neurology consultation          Diet: Regular Diet Adult    DVT Prophylaxis: Pneumatic Compression Devices  Ornelas Catheter: Not present  Lines: None     Cardiac Monitoring: None  Code Status:  Full resuscitation status    Clinically Significant Risk Factors Present on Admission        " "         # Drug Induced Platelet Defect: home medication list includes an antiplatelet medication   # Hypertension: Noted on problem list                    Disposition Plan     Medically Ready for Discharge: Anticipated in 2-4 Days           Conner Adamson DO, MHS  Hospitalist Service, GOLD TEAM   [facility]  Securely message with [employer] (more info)  Text page via [facility] Paging/Directory   See signed in provider for up to date coverage information    ______________________________________________________________________    Chief Complaint   Right eye redness, blurry vision, photosensitivity.    History is obtained from the patient, patient's daughter, & electronic medical record.    History of Present Illness   This is a very kind 74-year-old -American female presented to the [facility] with right eye redness and light sensitivity.  Patient's daughter is present at bedside and reports that patient is \"not acting right\" and is confused.  Patient has a pertinent chronic past medical history positive for hypertension.  No PTA antihypertensive regimen identified via medication reconciliation.  Upon evaluation in the emergency department patient's systolic blood pressure was found to be over 200 mmHg.  Patient answers questions appropriately.  Patient reports her right eye vision is blurry.  Ophthalmology is actively evaluating patient and providing recommendations.  Of note, CT head demonstrates ventriculomegaly with effacement of cerebral sulci.  This may represent normal pressure hydrocephalus in the appropriate clinical setting.  Patient will be admitted to the internal medicine hospitalist service for further evaluation and management.    Past Medical History    Past Medical History:   Diagnosis Date    Hypertension        Past Surgical History   Past Surgical History:   Procedure Laterality Date    COLONOSCOPY N/A 8/30/2021    " Procedure: Colonoscopy, With Polypectomy And Biopsy;  Surgeon: Federico Vital MD;  Location:  GI       Prior to Admission Medications   Prior to Admission Medications   Prescriptions Last Dose Informant Patient Reported? Taking?   aspirin (ASA) 325 MG EC tablet   No No   Sig: Take 1 tablet (325 mg) by mouth daily   calcium carbonate 750 MG CHEW 10/17/2024  Yes Yes   Sig: Take 750 mg by mouth daily.   dextromethorphan (TUSSIN COUGH) 15 MG/5ML syrup   Yes Yes   Sig: Take 10 mLs by mouth 4 times daily as needed for cough.   ibuprofen (ADVIL/MOTRIN) 200 MG capsule 10/18/2024  Yes Yes   Sig: Take 400 mg by mouth every 6 hours as needed for moderate pain or mild pain.   polymixin b-trimethoprim (POLYTRIM) 43282-3.1 UNIT/ML-% ophthalmic solution 10/17/2024  No Yes   Sig: Place 1-2 drops into the right eye every 4 hours.      Facility-Administered Medications: None        Physical Exam   Vital Signs: Temp: 98.1  F (36.7  C) Temp src: Oral BP: (!) 197/124 Pulse: 64     SpO2: 99 %      Weight: 150 lbs 1.6 oz    GENERAL: Alert and oriented x 3; no acute distress; well-nourished.  HEENT: Normocephalic; atraumatic; right eye injected.  CV: RRR; normal S1, S2; no rubs, murmurs, or gallops.  RESP: Lung fields clear to aucultation B/L; no wheezing or crepitations.  GI: Abdomen is soft, nontender, nondistended; no organomegaly; normal bowel sounds.  : Deferred genital examination.   MSK: No clubbing, cyanosis, or edema.  DERM: Skin is intact; no rash, lesions, or skin breakdown.  NEURO: No focal deficits appreciated; strength & sensorium are grossly intact.  PSYCH: No active hallucinations; affect, insight appear within normal limits.    Medical Decision Making       55 MINUTES SPENT BY ME on the date of service doing chart review, history, exam, documentation & further activities per the note.      Data     I have personally reviewed the following data over the past 24 hrs:    7.1  \   15.2   / 206     139 99 7.5 (L) /  118  (H)   4.0 25 0.84 \     ALT: 11 AST: 15 AP: 115 TBILI: 0.4   ALB: 4.9 TOT PROTEIN: 9.5 (H) LIPASE: N/A     Procal: 0.04 CRP: N/A Lactic Acid: 1.4         Imaging results reviewed over the past 24 hrs:   Recent Results (from the past 24 hour(s))   CT Head w/o Contrast    Narrative    EXAM: CT HEAD W/O CONTRAST  10/18/2024 2:47 PM     HISTORY:  delirium       COMPARISON:  Head CT 1/17/2022    TECHNIQUE: Using multidetector thin collimation helical acquisition  technique, axial, coronal and sagittal CT images from the skull base  to the vertex were obtained without intravenous contrast.   (topogram) image(s) also obtained and reviewed. Dose reduction  techniques were performed.    FINDINGS:  No intracranial hemorrhage, mass effect, or midline shift. No acute  loss of gray-white matter differentiation in the cerebral hemispheres.  Ventricles are proportionate to the cerebral sulci. Clear basal  cisterns. Advanced periventricular and subcortical hypoattenuation,  consistent with chronic small vessel ischemic disease. Chronic lacunar  infarcts in bilateral basal ganglia. Mild to moderate ventriculomegaly  with effacement of cerebral sulci at the vertex.    The bony calvaria and the bones of the skull base are normal. The  visualized portions of the paranasal sinuses and mastoid air cells are  clear. Grossly normal orbits.       Impression    IMPRESSION:   1. No acute intracranial pathology.   2. Advanced chronic small vessel ischemic disease and cerebral  atrophy.  3. Ventriculomegaly with effacement of cerebral sulci. This may  represent normal pressure hydrocephalus in the appropriate clinical  setting.    TODD BENITEZ MD         SYSTEM ID:  [id number]   XR Chest 2 Views    Narrative    XR CHEST 2 VIEWS 10/18/2024 2:57 PM    HISTORY: not feeling well - pna?    COMPARISON: 1/17/2022      Impression    IMPRESSION: No acute cardiopulmonary process.    MIKE LIND MD         SYSTEM ID:  [id number]

## 2024-10-18 NOTE — CONSULTS
"  OPHTHALMOLOGY CONSULT NOTE      Patient: Rolanda Molina  Consulted by: [facility] [place] [facility]  Reason for Consult: Severe vision loss right eye, red eye  Date of initial evaluation: 10/18/24    ASSESSMENT/PLAN:     Rolanda Molina is a 74 year old female who presents with     # Markedly elevated IOP with NLP vision, right eye   # Subacute angle closure suspected, right eye   # Corneal edema, right eye   - NLP vision is of unknown duration, though symptom onset has been about one week. This patient only presented complaining of blurriness with no light perception vision found on objective testing  - Not in a state of significant pain at this time in spite of elevated pressures, complains only of \"scratchiness\"  - Given multiple rounds of maximum topical therapy and Diamox 500 mg IV, without significant response in intraocular pressure. IOP remains above reportable range on Tonopen  - Head CT without retro-orbital hemorrhage. B-scan with no vitreous debris  - Assessment: This is a mixed picture - acute pressure elevation to this degree would normally be exquisitely painful and her corneal edema suggests acuity. Therefore, there is likely an acute on chronic component to this presentation given the severity of vision loss and the discrepancy between the subjective complaint: \"blurred vision with minimal discomfort;\" and objective findings: NLP vision, corneal edema, and elevated IOP beyond readable range by Tonopen.  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 Considered mannitol IV versus procedural/surgical intervention to lower the pressure, but there does not seem to be a likelihood of visual recovery in this case given delayed presentation with symptom onset over one week ago - risk therefore would outweigh benefit.     RECOMMENDATIONS:  - Cosopt BID, both eyes  - " Brimonidine 0.2% TID, both eyes  - Latanoprost at bedtime, both eyes  - Diamox Sequels 250 mg BID PO  - Full time glasses wear, monocular precautions  - Follow up in acute clinic Monday    # Ocular hypertension, left eye  # Hyperopia, left eye  - IOP elevated to 32-38 10/18/24 by Tonopen  - C:D 0.4, no visual field loss by confrontation  - Recommend topical drops as above and follow up in acute clinic for further assessment and LPI  - Follow up subsequently in comprehensive clinic for cataract evaluation     It is our pleasure to participate in this patient's care and treatment. Please contact us with any further questions or concerns.    Patient discussed with Dr. Reeves.    Khai Mcintosh MD  Resident Physician, PGY-2  Department of Ophthalmology      HISTORY OF PRESENTING ILLNESS:     Rolanda Molina is a 74 year old female with history of hyperopia who presents for evaluation of vision loss and redness in right eye.    She was seen in the ED on 10/7 and diagnosed with an acute bacterial conjunctivitis of the right eye. Intraocular pressure is not recorded from that visit. She used topical antibiotic eyedrops for the past 10 days without significant relief of right eye redness. Within the past few days she began to notice vision in the right eye was declining, but she did not immediately seek care. Notable, she states she had some headaches during this period but she did not have severe eye pain, nausea, or vomiting.    She does not have a personal history of glaucoma. She does not take any medications with anticholinergic properties or suspects for precipitating angle closure. She states that she had a mother and sister with glaucoma but neither of them went blind.     She was found to have quite elevated blood pressure today, with systolics in the 200-210 range. Daughter also states she is more confused than usual.    Review of systems were otherwise negative except for that which has been stated  above.      OCULAR/MEDICAL/SURGICAL HISTORIES:     Past Ocular History:  Last eye exam: 2023  Prior eye surgery/laser: None  Contact lens wear: None  Glasses: None  Eyedrops: None    Family History:  Mother and sister with glaucoma    Social History:  Noncontributory    Past Medical History:   Diagnosis Date    Hypertension        Past Surgical History:   Procedure Laterality Date    COLONOSCOPY N/A 8/30/2021    Procedure: Colonoscopy, With Polypectomy And Biopsy;  Surgeon: Federico Vital MD;  Location:  GI       EXAMINATION:     Base Eye Exam       Neuro/Psych       Oriented x3: Yes    Mood/Affect: Normal                  Slit Lamp and Fundus Exam       External Exam         Right Left    External Protective ptosis Normal              Slit Lamp Exam         Right Left    Lids/Lashes 2+ Dermatochalasis 2+ Dermatochalasis    Conjunctiva/Sclera 2+ injection White and quiet    Cornea Arcus Senilism diffuse coneal edema Arcus Senilis    Anterior Chamber Deep centrally, shallow peripherally, no obvious hypopyon Deep and quiet    Iris Mid-fixed dilated Round and reactive-> pharm dilated    Lens 2-3+ Nuclear sclerosis cataract, 2+ Cortical cataract 1+ Nuclear sclerosis cataract, 2+ Cortical cataract              Fundus Exam         Right Left    Disc Hazy view Large nerve    C/D Ratio  0.4    Macula  Normal    Vessels  Normal    Periphery  Normal                    Labs/Studies/Imaging Performed:    Results for orders placed or performed during the hospital encounter of 10/18/24   CT Head w/o Contrast    Impression    IMPRESSION:   1. No acute intracranial pathology.   2. Advanced chronic small vessel ischemic disease and cerebral  atrophy.  3. Ventriculomegaly with effacement of cerebral sulci. This may  represent normal pressure hydrocephalus in the appropriate clinical  setting.    TODD BENITEZ MD         SYSTEM ID:  [id number]   XR Chest 2 Views    Impression    IMPRESSION: No acute cardiopulmonary  process.    MIKE LIND MD         SYSTEM ID:  [id number]

## 2024-10-18 NOTE — ED PROVIDER NOTES
"ED Provider Note  [facility]      History     Chief Complaint   Patient presents with    Eye Problem     Right eye redness, drainage for over a week. Pt seen last week and using drops that do not seem to help. Pt C/O tooth ache pain (does not wear her dentures)     HPI  Rolanda Molina is a 74 year old  female with no ongoing medical problems was evaluated 10 days ago for right eye redness and was treated with ophthalmic antibiotics which patient has been taking. she had flulike symptoms at that time now presents with the continuation of eye redness light sensitivity. denies fever headache reports \"tooth pain despite not having any of her native teeth. daughter at bedside reports that she is \"not acting right\" and is confused.    Past Medical History  Past Medical History:   Diagnosis Date    Hypertension      Past Surgical History:   Procedure Laterality Date    COLONOSCOPY N/A 8/30/2021    Procedure: Colonoscopy, With Polypectomy And Biopsy;  Surgeon: Federico Vital MD;  Location:  GI     calcium carbonate 750 MG CHEW  dextromethorphan (TUSSIN COUGH) 15 MG/5ML syrup  ibuprofen (ADVIL/MOTRIN) 200 MG capsule  polymixin b-trimethoprim (POLYTRIM) 23639-0.1 UNIT/ML-% ophthalmic solution  aspirin (ASA) 325 MG EC tablet      Allergies   Allergen Reactions    Penicillins      Swelling of arms and legs     Family History  Family History   Problem Relation Age of Onset    Diabetes Mother         Type II    Thyroid Cancer Mother     Colon Cancer No family hx of      Social History   Social History     Tobacco Use    Smoking status: Every Day    Smokeless tobacco: Never    Tobacco comments:     starting smoking at age 26   Substance Use Topics    Alcohol use: Yes    Drug use: Never      A medically appropriate review of systems was performed with pertinent positives and negatives noted in the HPI, and all other systems negative.    Physical Exam   BP: (!) 196/89  Pulse: 64  Temp: 98.1  F " "(36.7  C)  Height: 170.2 cm (5' 7\")  Weight: 68.1 kg (150 lb 1.6 oz)  SpO2: 100 %  Physical Exam   patient alert oriented and occasionally will repeat herself and give inconsistent history  right eye severely injected   photosensitive to light   no purulent drainage on lids or lashes   clear drainage on lids and lashes   right eye visual acuity to light and motion only unable to see 2200 left eye 20/50 visual acuity without redness and positive reactive pupil   pulse regular rate and rhythm   lungs clear   moving all 4 extremities in bed    ED Course, Procedures, & Data      Procedures           IV Antibiotics given and/or elevated Lactate of 2.4 and no sepsis note found - Delete this reminder and enter the sepsis note or '.edcms' before signing chart.>>>     Results for orders placed or performed during the hospital encounter of 10/18/24   CT Head w/o Contrast     Status: None    Narrative    EXAM: CT HEAD W/O CONTRAST  10/18/2024 2:47 PM     HISTORY:  delirium       COMPARISON:  Head CT 1/17/2022    TECHNIQUE: Using multidetector thin collimation helical acquisition  technique, axial, coronal and sagittal CT images from the skull base  to the vertex were obtained without intravenous contrast.   (topogram) image(s) also obtained and reviewed. Dose reduction  techniques were performed.    FINDINGS:  No intracranial hemorrhage, mass effect, or midline shift. No acute  loss of gray-white matter differentiation in the cerebral hemispheres.  Ventricles are proportionate to the cerebral sulci. Clear basal  cisterns. Advanced periventricular and subcortical hypoattenuation,  consistent with chronic small vessel ischemic disease. Chronic lacunar  infarcts in bilateral basal ganglia. Mild to moderate ventriculomegaly  with effacement of cerebral sulci at the vertex.    The bony calvaria and the bones of the skull base are normal. The  visualized portions of the paranasal sinuses and mastoid air cells are  clear. " Grossly normal orbits.       Impression    IMPRESSION:   1. No acute intracranial pathology.   2. Advanced chronic small vessel ischemic disease and cerebral  atrophy.  3. Ventriculomegaly with effacement of cerebral sulci. This may  represent normal pressure hydrocephalus in the appropriate clinical  setting.    TODD BENITEZ MD         SYSTEM ID:  [id number]   XR Chest 2 Views     Status: None    Narrative    XR CHEST 2 VIEWS 10/18/2024 2:57 PM    HISTORY: not feeling well - pna?    COMPARISON: 1/17/2022      Impression    IMPRESSION: No acute cardiopulmonary process.    MIKE LIND MD         SYSTEM ID:  [id number]   Comprehensive metabolic panel     Status: Abnormal   Result Value Ref Range    Sodium 139 135 - 145 mmol/L    Potassium 4.0 3.4 - 5.3 mmol/L    Carbon Dioxide (CO2) 25 22 - 29 mmol/L    Anion Gap 15 7 - 15 mmol/L    Urea Nitrogen 7.5 (L) 8.0 - 23.0 mg/dL    Creatinine 0.84 0.51 - 0.95 mg/dL    GFR Estimate 72 >60 mL/min/1.73m2    Calcium 10.5 (H) 8.8 - 10.4 mg/dL    Chloride 99 98 - 107 mmol/L    Glucose 118 (H) 70 - 99 mg/dL    Alkaline Phosphatase 115 40 - 150 U/L    AST 15 0 - 45 U/L    ALT 11 0 - 50 U/L    Protein Total 9.5 (H) 6.4 - 8.3 g/dL    Albumin 4.9 3.5 - 5.2 g/dL    Bilirubin Total 0.4 <=1.2 mg/dL   Procalcitonin     Status: Normal   Result Value Ref Range    Procalcitonin 0.04 <0.50 ng/mL   Lactic acid whole blood with 1x repeat in 2 hr when >2     Status: Abnormal   Result Value Ref Range    Lactic Acid, Initial 2.4 (H) 0.7 - 2.0 mmol/L   UA with Microscopic reflex to Culture     Status: Abnormal    Specimen: Urine, Midstream   Result Value Ref Range    Color Urine Light Yellow Colorless, Straw, Light Yellow, Yellow    Appearance Urine Slightly Cloudy (A) Clear    Glucose Urine Negative Negative mg/dL    Bilirubin Urine Negative Negative    Ketones Urine Negative Negative mg/dL    Specific Gravity Urine 1.013 1.003 - 1.035    Blood Urine Negative Negative    pH Urine 7.0 5.0 - 7.0     Protein Albumin Urine Negative Negative mg/dL    Urobilinogen Urine Normal Normal, 2.0 mg/dL    Nitrite Urine Negative Negative    Leukocyte Esterase Urine Negative Negative    Bacteria Urine Few (A) None Seen /HPF    Mucus Urine Present (A) None Seen /LPF    RBC Urine 1 <=2 /HPF    WBC Urine 6 (H) <=5 /HPF    Squamous Epithelials Urine 1 <=1 /HPF    Narrative    Urine Culture not indicated   Asymptomatic Influenza A/B, RSV, & SARS-CoV2 PCR (COVID-19) Nose     Status: Normal    Specimen: Nose; Swab   Result Value Ref Range    Influenza A PCR Negative Negative    Influenza B PCR Negative Negative    RSV PCR Negative Negative    SARS CoV2 PCR Negative Negative    Narrative    Testing was performed using the Xpert Xpress CoV2/Flu/RSV Assay on the Cepheid GeneXpert Instrument. This test should be ordered for the detection of SARS-CoV-2, influenza, and RSV viruses in individuals who meet clinical and/or epidemiological criteria. Test performance is unknown in asymptomatic patients. This test is for in vitro diagnostic use under the FDA EUA for laboratories certified under CLIA to perform high or moderate complexity testing. This test has not been FDA cleared or approved. A negative result does not rule out the presence of PCR inhibitors in the specimen or target RNA in concentration below the limit of detection for the assay. If only one viral target is positive but coinfection with multiple targets is suspected, the sample should be re-tested with another FDA cleared, approved, or authorized test, if coinfection would change clinical management. This test was validated by the [facility]. These laboratories are certified under the Clinical Laboratory Improvement Amendments of 1988 (CLIA-88) as qualified to perform high complexity laboratory testing.   CBC with platelets and differential     Status: Abnormal   Result Value Ref Range    WBC Count 7.1 4.0 - 11.0 10e3/uL    RBC Count 6.09 (H) 3.80 - 5.20  10e6/uL    Hemoglobin 15.2 11.7 - 15.7 g/dL    Hematocrit 49.1 (H) 35.0 - 47.0 %    MCV 81 78 - 100 fL    MCH 25.0 (L) 26.5 - 33.0 pg    MCHC 31.0 (L) 31.5 - 36.5 g/dL    RDW 15.2 (H) 10.0 - 15.0 %    Platelet Count 206 150 - 450 10e3/uL    % Neutrophils 70 %    % Lymphocytes 24 %    % Monocytes 6 %    % Eosinophils 0 %    % Basophils 0 %    % Immature Granulocytes 0 %    NRBCs per 100 WBC 0 <1 /100    Absolute Neutrophils 5.0 1.6 - 8.3 10e3/uL    Absolute Lymphocytes 1.7 0.8 - 5.3 10e3/uL    Absolute Monocytes 0.4 0.0 - 1.3 10e3/uL    Absolute Eosinophils 0.0 0.0 - 0.7 10e3/uL    Absolute Basophils 0.0 0.0 - 0.2 10e3/uL    Absolute Immature Granulocytes 0.0 <=0.4 10e3/uL    Absolute NRBCs 0.0 10e3/uL   CBC with platelets differential     Status: Abnormal    Narrative    The following orders were created for panel order CBC with platelets differential.  Procedure                               Abnormality         Status                     ---------                               -----------         ------                     CBC with platelets and d...[[id number]]  Abnormal            Final result                 Please view results for these tests on the individual orders.     Medications   dorzolamide-timolol (COSOPT) ophthalmic solution 1 drop (has no administration in time range)   latanoprost (XALATAN) 0.005 % ophthalmic solution 1 drop (has no administration in time range)   brimonidine (ALPHAGAN) 0.2 % ophthalmic solution 1 drop (has no administration in time range)   proparacaine (ALCAINE) 0.5 % ophthalmic solution 2 drop (2 drops Right Eye Not Given 10/18/24 1200)   acetaminophen (TYLENOL) tablet 1,000 mg (1,000 mg Oral $Given 10/18/24 1049)   acetaZOLAMIDE (DIAMOX) injection 500 mg (500 mg Intravenous $Given 10/18/24 1500)     Labs Ordered and Resulted from Time of ED Arrival to Time of ED Departure   COMPREHENSIVE METABOLIC PANEL - Abnormal       Result Value    Sodium 139      Potassium 4.0      Carbon  Dioxide (CO2) 25      Anion Gap 15      Urea Nitrogen 7.5 (*)     Creatinine 0.84      GFR Estimate 72      Calcium 10.5 (*)     Chloride 99      Glucose 118 (*)     Alkaline Phosphatase 115      AST 15      ALT 11      Protein Total 9.5 (*)     Albumin 4.9      Bilirubin Total 0.4     LACTIC ACID WHOLE BLOOD WITH 1X REPEAT IN 2 HR WHEN >2 - Abnormal    Lactic Acid, Initial 2.4 (*)    ROUTINE UA WITH MICROSCOPIC REFLEX TO CULTURE - Abnormal    Color Urine Light Yellow      Appearance Urine Slightly Cloudy (*)     Glucose Urine Negative      Bilirubin Urine Negative      Ketones Urine Negative      Specific Gravity Urine 1.013      Blood Urine Negative      pH Urine 7.0      Protein Albumin Urine Negative      Urobilinogen Urine Normal      Nitrite Urine Negative      Leukocyte Esterase Urine Negative      Bacteria Urine Few (*)     Mucus Urine Present (*)     RBC Urine 1      WBC Urine 6 (*)     Squamous Epithelials Urine 1     CBC WITH PLATELETS AND DIFFERENTIAL - Abnormal    WBC Count 7.1      RBC Count 6.09 (*)     Hemoglobin 15.2      Hematocrit 49.1 (*)     MCV 81      MCH 25.0 (*)     MCHC 31.0 (*)     RDW 15.2 (*)     Platelet Count 206      % Neutrophils 70      % Lymphocytes 24      % Monocytes 6      % Eosinophils 0      % Basophils 0      % Immature Granulocytes 0      NRBCs per 100 WBC 0      Absolute Neutrophils 5.0      Absolute Lymphocytes 1.7      Absolute Monocytes 0.4      Absolute Eosinophils 0.0      Absolute Basophils 0.0      Absolute Immature Granulocytes 0.0      Absolute NRBCs 0.0     PROCALCITONIN - Normal    Procalcitonin 0.04     INFLUENZA A/B, RSV, & SARS-COV2 PCR - Normal    Influenza A PCR Negative      Influenza B PCR Negative      RSV PCR Negative      SARS CoV2 PCR Negative     LACTIC ACID WHOLE BLOOD     XR Chest 2 Views   Final Result   IMPRESSION: No acute cardiopulmonary process.      MIKE LIND MD            SYSTEM ID:  [id number]      CT Head w/o Contrast   Final Result    IMPRESSION:    1. No acute intracranial pathology.    2. Advanced chronic small vessel ischemic disease and cerebral   atrophy.   3. Ventriculomegaly with effacement of cerebral sulci. This may   represent normal pressure hydrocephalus in the appropriate clinical   setting.      TODD BENITEZ MD            SYSTEM ID:  [id number]             Critical care was not performed.     Medical Decision Making  The patient's presentation was of high complexity (an acute health issue posing potential threat to life or bodily function).    The patient's evaluation involved:  ordering and/or review of 1 test(s) in this encounter (see separate area of note for details)  independent interpretation of testing performed by another health professional (chest x-ray)  discussion of management or test interpretation with another health professional (ophthalmology)    The patient's management necessitated high risk (a decision regarding hospitalization).    Assessment & Plan    mild delirium in the setting of right eye redness and pain refractory to outpatient ophthalmic antibiotics. plan ophthalmology consult. CT head labs including procalcitonin and urinalysis to screen for UTI bacterial infection electrolyte abnormality or organ failure. COVID swab given recent viral syndrome and will reassess. Tylenol and proparacaine for pain and discomfort    12:30 PM -Labs showed mildly elevated lactate.  Still pending chest x-ray and CT head.  Respiratory viral swab negative.  No signs of UTI on urinalysis    330p -I discussed with ophthalmology consult who reports that patient has subacute glaucoma and almost no visual acuity from the right eye.  Will treat with very frequent glaucoma eyedrops for pressure relief and Diamox.  Given patient's delirium and hypertension in the setting of this subacute vision loss will admit to hospital medicine.      I have reviewed the nursing notes. I have reviewed the findings, diagnosis, plan and need for  follow up with the patient.    New Prescriptions    No medications on file       Final diagnoses:   Redness of right eye   Delirium   Hypertension, unspecified type     This part of the medical record was transcribed by Staci Leonardo, Medical Scribe, from a dictation done by Jose Alfredo El MD.     Jose Alfredo El MD  [facility] EMERGENCY DEPARTMENT  10/18/2024     Jose Alfredo El MD  10/18/24 1226       Jose Alfredo El MD  10/18/24 2643

## 2024-10-18 NOTE — PHARMACY-ADMISSION MEDICATION HISTORY
Pharmacist Admission Medication History    Admission medication history is complete. The information provided in this note is only as accurate as the sources available at the time of the update.    Medication reconciliation/reorder completed by provider prior to medication history? No    Information Source(s): Patient and Family member via in-person    Pertinent Information: Discussed medication history with patient and patient's daughter who reported that patient does not take anything outside of ibuprofen as needed.     Changes made to PTA medication list:  Added: None  Deleted: aspirin, calcium carbonate, dextromethorphan, polymixin  Changed: None    Allergies reviewed with patient and updates made in EHR: yes    Medication History Completed By: TOMÁS HANNA [facility] 10/18/2024 4:25 PM    Prior to Admission medications    Medication Sig Last Dose Taking? Auth Provider Long Term End Date   ibuprofen (ADVIL/MOTRIN) 200 MG capsule Take 400 mg by mouth every 6 hours as needed for moderate pain or mild pain. 10/18/2024 Yes Reported, Patient     polymixin b-trimethoprim (POLYTRIM) 12467-1.1 UNIT/ML-% ophthalmic solution Place 1-2 drops into the right eye every 4 hours. 10/17/2024 Yes Ahsan Zhang MD

## 2024-10-18 NOTE — ED NOTES
Pt complains of mouth and gum pain rated at a 6, then a 8. After tylenol was given she said the pain went down. Pt's visitor explains she is confused and that she forgot she got medication while being here. Pt denies eye pain and did not want the proparacaine medication offered. Care transferred to [facility].

## 2024-10-18 NOTE — ED NOTES
Patient voided.    4:38 PM Recheck right eye pressures: 70, 60, 57.         Enedina Pope MD  10/18/24 1777       Enedina Pope MD  10/18/24 1498

## 2024-10-19 ENCOUNTER — APPOINTMENT (OUTPATIENT)
Dept: OCCUPATIONAL THERAPY | Facility: CLINIC | Age: 74
DRG: 124 | End: 2024-10-19
Attending: INTERNAL MEDICINE
Payer: COMMERCIAL

## 2024-10-19 LAB
ALBUMIN SERPL BCG-MCNC: 4.3 G/DL (ref 3.5–5.2)
ALP SERPL-CCNC: 98 U/L (ref 40–150)
ALT SERPL W P-5'-P-CCNC: 8 U/L (ref 0–50)
ANION GAP SERPL CALCULATED.3IONS-SCNC: 14 MMOL/L (ref 7–15)
AST SERPL W P-5'-P-CCNC: 15 U/L (ref 0–45)
BASOPHILS # BLD AUTO: 0 10E3/UL (ref 0–0.2)
BASOPHILS NFR BLD AUTO: 0 %
BILIRUB SERPL-MCNC: 0.2 MG/DL
BUN SERPL-MCNC: 8.6 MG/DL (ref 8–23)
CALCIUM SERPL-MCNC: 9.1 MG/DL (ref 8.8–10.4)
CHLORIDE SERPL-SCNC: 104 MMOL/L (ref 98–107)
CREAT SERPL-MCNC: 0.93 MG/DL (ref 0.51–0.95)
EGFRCR SERPLBLD CKD-EPI 2021: 63 ML/MIN/1.73M2
EOSINOPHIL # BLD AUTO: 0.1 10E3/UL (ref 0–0.7)
EOSINOPHIL NFR BLD AUTO: 1 %
ERYTHROCYTE [DISTWIDTH] IN BLOOD BY AUTOMATED COUNT: 15.1 % (ref 10–15)
GLUCOSE SERPL-MCNC: 108 MG/DL (ref 70–99)
HCO3 SERPL-SCNC: 20 MMOL/L (ref 22–29)
HCT VFR BLD AUTO: 44.6 % (ref 35–47)
HGB BLD-MCNC: 14.3 G/DL (ref 11.7–15.7)
IMM GRANULOCYTES # BLD: 0 10E3/UL
IMM GRANULOCYTES NFR BLD: 0 %
LYMPHOCYTES # BLD AUTO: 3.5 10E3/UL (ref 0.8–5.3)
LYMPHOCYTES NFR BLD AUTO: 37 %
MAGNESIUM SERPL-MCNC: 2.2 MG/DL (ref 1.7–2.3)
MCH RBC QN AUTO: 26 PG (ref 26.5–33)
MCHC RBC AUTO-ENTMCNC: 32.1 G/DL (ref 31.5–36.5)
MCV RBC AUTO: 81 FL (ref 78–100)
MONOCYTES # BLD AUTO: 0.9 10E3/UL (ref 0–1.3)
MONOCYTES NFR BLD AUTO: 10 %
NEUTROPHILS # BLD AUTO: 4.9 10E3/UL (ref 1.6–8.3)
NEUTROPHILS NFR BLD AUTO: 52 %
NRBC # BLD AUTO: 0 10E3/UL
NRBC BLD AUTO-RTO: 0 /100
PLAT MORPH BLD: NORMAL
PLATELET # BLD AUTO: 213 10E3/UL (ref 150–450)
POTASSIUM SERPL-SCNC: 3.7 MMOL/L (ref 3.4–5.3)
PROT SERPL-MCNC: 7.6 G/DL (ref 6.4–8.3)
RBC # BLD AUTO: 5.5 10E6/UL (ref 3.8–5.2)
RBC MORPH BLD: NORMAL
SODIUM SERPL-SCNC: 138 MMOL/L (ref 135–145)
WBC # BLD AUTO: 9.4 10E3/UL (ref 4–11)

## 2024-10-19 PROCEDURE — 85025 COMPLETE CBC W/AUTO DIFF WBC: CPT | Performed by: INTERNAL MEDICINE

## 2024-10-19 PROCEDURE — 250N000013 HC RX MED GY IP 250 OP 250 PS 637

## 2024-10-19 PROCEDURE — 80053 COMPREHEN METABOLIC PANEL: CPT | Performed by: INTERNAL MEDICINE

## 2024-10-19 PROCEDURE — 250N000013 HC RX MED GY IP 250 OP 250 PS 637: Performed by: INTERNAL MEDICINE

## 2024-10-19 PROCEDURE — 83735 ASSAY OF MAGNESIUM: CPT | Performed by: INTERNAL MEDICINE

## 2024-10-19 PROCEDURE — 99233 SBSQ HOSP IP/OBS HIGH 50: CPT | Performed by: STUDENT IN AN ORGANIZED HEALTH CARE EDUCATION/TRAINING PROGRAM

## 2024-10-19 PROCEDURE — 999N000147 HC STATISTIC PT IP EVAL DEFER

## 2024-10-19 PROCEDURE — 120N000002 HC R&B MED SURG/OB UMMC

## 2024-10-19 PROCEDURE — 36415 COLL VENOUS BLD VENIPUNCTURE: CPT | Performed by: INTERNAL MEDICINE

## 2024-10-19 PROCEDURE — 97535 SELF CARE MNGMENT TRAINING: CPT | Mod: GO | Performed by: OCCUPATIONAL THERAPIST

## 2024-10-19 PROCEDURE — 99232 SBSQ HOSP IP/OBS MODERATE 35: CPT | Mod: 4UV | Performed by: OPHTHALMOLOGY

## 2024-10-19 PROCEDURE — 97166 OT EVAL MOD COMPLEX 45 MIN: CPT | Mod: GO | Performed by: OCCUPATIONAL THERAPIST

## 2024-10-19 PROCEDURE — 99222 1ST HOSP IP/OBS MODERATE 55: CPT | Performed by: INTERNAL MEDICINE

## 2024-10-19 RX ADMIN — LATANOPROST 1 DROP: 50 SOLUTION OPHTHALMIC at 21:51

## 2024-10-19 RX ADMIN — DORZOLAMIDE HYDROCHLORIDE AND TIMOLOL MALEATE 1 DROP: 20; 5 SOLUTION OPHTHALMIC at 20:52

## 2024-10-19 RX ADMIN — BRIMONIDINE TARTRATE 1 DROP: 2 SOLUTION/ DROPS OPHTHALMIC at 20:52

## 2024-10-19 RX ADMIN — BRIMONIDINE TARTRATE 1 DROP: 2 SOLUTION/ DROPS OPHTHALMIC at 13:14

## 2024-10-19 RX ADMIN — ACETAZOLAMIDE 250 MG: 250 TABLET ORAL at 17:23

## 2024-10-19 RX ADMIN — DORZOLAMIDE HYDROCHLORIDE AND TIMOLOL MALEATE 1 DROP: 20; 5 SOLUTION OPHTHALMIC at 09:18

## 2024-10-19 RX ADMIN — LOSARTAN POTASSIUM 25 MG: 25 TABLET, FILM COATED ORAL at 09:17

## 2024-10-19 RX ADMIN — ACETAZOLAMIDE 250 MG: 250 TABLET ORAL at 09:17

## 2024-10-19 RX ADMIN — BRIMONIDINE TARTRATE 1 DROP: 2 SOLUTION/ DROPS OPHTHALMIC at 09:18

## 2024-10-19 ASSESSMENT — ACTIVITIES OF DAILY LIVING (ADL)
ADLS_ACUITY_SCORE: 33
ADLS_ACUITY_SCORE: 33
ADLS_ACUITY_SCORE: 31
ADLS_ACUITY_SCORE: 33
ADLS_ACUITY_SCORE: 31
ADLS_ACUITY_SCORE: 33
PREVIOUS_RESPONSIBILITIES: MEAL PREP;HOUSEKEEPING;LAUNDRY;SHOPPING;MEDICATION MANAGEMENT;FINANCES
ADLS_ACUITY_SCORE: 33
ADLS_ACUITY_SCORE: 31
ADLS_ACUITY_SCORE: 33
ADLS_ACUITY_SCORE: 31

## 2024-10-19 NOTE — PLAN OF CARE
"Goal Outcome Evaluation:      Plan of Care Reviewed With: patient    Overall Patient Progress: no change    Outcome Evaluation: Pt will discharge when medically ready.    VS:       Pt A/O X 4. Confused at times. Afebrile. VSS. BP (!) 147/96 (BP Location: Left arm, Patient Position: Right side)   Pulse 62   Temp 98.6  F (37  C) (Oral)   Resp 18   Ht 1.702 m (5' 7\")   Wt 68.1 kg (150 lb 1.6 oz)   SpO2 98%   BMI 23.51 kg/m   Lungs- clear bilaterally with both anterior and posterior. Denies nausea, shortness of breath, and chest pain.     Output:       Bowels- active in all four quadrants. Voids spontaneously without difficulty in the bathroom. Incontinent at times.      Activity:       Pt up assist x1 with walker.     Skin:   Pt refused full skin assessment, all visible skin appears to be intact.     Pain:       Denies pain at this time.     CMS:       CMS and Neuro's are intact. Denies numbness and tingling in all extremities.      Dressing:       No incisional dressing.     Diet:       Pt is on a regular diet and appetite was good this shift.       LDA:       PIV is patent in the L forearm and SL.      Equipment:       Bilateral heels are elevated off the bed. Walker from home in the room.     Plan:       Pt is able to make needs known and the call light is within the pt's reach. Continue to monitor.       Additional Info:              "

## 2024-10-19 NOTE — PROGRESS NOTES
[facility]    Medicine Progress Note - Hospitalist Service, GOLD TEAM 21    Date of Admission:  10/18/2024    Assessment & Plan   Rolanda Molina is a 74 year old female with no significant past medical history who presents with R eye redness and light sensitivity as well as intermittent confusion. 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     # Markedly elevated IOP with NLP vision, right eye   # Subacute angle closure suspected, right eye   # Corneal edema, right eye   Symptoms had been ongoing for ~1 week with redness and light sensitivity. Denies pain. Seen by ophthalmology in the ED and found to have significant elevated pressures in her R eye with corneal edema. However, given the pt is not in significant pain, likely an acute on chronic picture. Eye pressure did not respond to IV diamox. Given the duration of symptoms, ophthalmology felt the risks of surgery or IV mannitol outweighed the benefits as it is unclear if she will have visual recovery.  - Cosopt BID, both eyes  - Brimonidine 0.2% TID, both eyes  - Latanoprost at bedtime, both eyes  - Diamox Sequels 250 mg BID PO  - Full time glasses wear, monocular precautions  - Ophthalmology consulted  - OT: recommending home with 24/7 supervision and HH/OT/PT. If not able to be accomodated by family, they would recommend TCU to maximize safety and independence.    # Hypertensive urgency - improved  - Add losartan 25 mg p.o. daily  - Continue to closely monitor blood pressure  - Make further medication adjustments as necessary    # Intermittent confusion  # Ventriculomegaly with effacement of cerebral sulci  # Consider normal pressure hydrocephalus  Pts daughter reporting several months of intermittent confusion and memory issues. States they will be having a conversation and she will forget something they just talked about. Will recover to baseline quickly and she feels that she is currently at her baseline.  "One fall in past few months that was a mechanical fall out of bed. CT imaging in ED with \"Ventriculomegaly with effacement of cerebral sulci. This may represent normal pressure hydrocephalus in the appropriate clinical setting.\"  - Neurology consultation          Diet: Combination Diet Regular Diet Adult    DVT Prophylaxis: Pneumatic Compression Devices  Ornelas Catheter: Not present  Lines: None     Cardiac Monitoring: None  Code Status: Full Code      Clinically Significant Risk Factors Present on Admission                   # Hypertension: Noted on problem list                    Disposition Plan     Medically Ready for Discharge: Anticipated Tomorrow             Mary Beth Kimble MD  Hospitalist Service, GOLD TEAM 21  [facility]  Securely message with [employer] (more info)  Text page via [employer] Paging/Directory   See signed in provider for up to date coverage information  ______________________________________________________________________    Interval History   No acute events overnight. No significant pain in her eye. Daughter at bedside. No current concerns for confusion.    Physical Exam   Vital Signs: Temp: 98.5  F (36.9  C) Temp src: Oral BP: (!) 164/98 Pulse: 57   Resp: 16 SpO2: 98 % O2 Device: None (Room air)    Weight: 150 lbs 1.6 oz    General Appearance: NAD. Lying comfortably in bed.  Respiratory: CTAB. No increased WOB.  Cardiovascular: RRR. No m/r/g  GI: Soft. Non-tender. Non-distended.  Skin: No rash.  Other: AOx4. Moving all extremities. Normal affect.    Medical Decision Making       55 MINUTES SPENT BY ME on the date of service doing chart review, history, exam, documentation & further activities per the note.  MANAGEMENT DISCUSSED with the following over the past 24 hours: ophthalmology, neurology       Data     I have personally reviewed the following data over the past 24 hrs:    9.4  \   14.3   / 213     138 104 8.6 /  108 (H)   3.7 20 (L) 0.93 \ "     ALT: 8 AST: 15 AP: 98 TBILI: 0.2   ALB: 4.3 TOT PROTEIN: 7.6 LIPASE: N/A     Procal: N/A CRP: N/A Lactic Acid: 1.4         Imaging results reviewed over the past 24 hrs:   Recent Results (from the past 24 hour(s))   CT Head w/o Contrast    Narrative    EXAM: CT HEAD W/O CONTRAST  10/18/2024 2:47 PM     HISTORY:  delirium       COMPARISON:  Head CT 1/17/2022    TECHNIQUE: Using multidetector thin collimation helical acquisition  technique, axial, coronal and sagittal CT images from the skull base  to the vertex were obtained without intravenous contrast.   (topogram) image(s) also obtained and reviewed. Dose reduction  techniques were performed.    FINDINGS:  No intracranial hemorrhage, mass effect, or midline shift. No acute  loss of gray-white matter differentiation in the cerebral hemispheres.  Ventricles are proportionate to the cerebral sulci. Clear basal  cisterns. Advanced periventricular and subcortical hypoattenuation,  consistent with chronic small vessel ischemic disease. Chronic lacunar  infarcts in bilateral basal ganglia. Mild to moderate ventriculomegaly  with effacement of cerebral sulci at the vertex.    The bony calvaria and the bones of the skull base are normal. The  visualized portions of the paranasal sinuses and mastoid air cells are  clear. Grossly normal orbits.       Impression    IMPRESSION:   1. No acute intracranial pathology.   2. Advanced chronic small vessel ischemic disease and cerebral  atrophy.  3. Ventriculomegaly with effacement of cerebral sulci. This may  represent normal pressure hydrocephalus in the appropriate clinical  setting.    TODD BENITEZ MD         SYSTEM ID:  [id number]   XR Chest 2 Views    Narrative    XR CHEST 2 VIEWS 10/18/2024 2:57 PM    HISTORY: not feeling well - pna?    COMPARISON: 1/17/2022      Impression    IMPRESSION: No acute cardiopulmonary process.    MIKE LIND MD         SYSTEM ID:  [id number]

## 2024-10-19 NOTE — PLAN OF CARE
Physical Therapy: Orders received. Chart reviewed and discussed with care team.? Physical Therapy not indicated due to per OT pt is SBA/CGA for transfers and amb w/ her FWW for household distances. Pt needs can be met by 1 discipline while IP, per OT rec home w/ home therapy and 24/7 assist d/t more cognitive/visual concerns w/ no acute mobility needs.? Defer discharge recommendations to OT and medical team.? Will complete orders.

## 2024-10-19 NOTE — CONSULTS
[facility] Neurology Consultation    Rolanda Molina MRN# [id number]   Age: 74 year old YOB: 1950     Date of Admission:  10/18/2024    Reason for consult: Possible NPH                  Assessment and Plan:   Assessment:  Patient is a 73 yo female who is being hospitalized for right eye redness, thought to most likely be related to acute angle closure glaucoma per notes. Neurology is consulted for abnormal CT head scan and possible NPH. CT head imaging was reviewed. Based on head imaging and CT head findings, NPH is a strong possibility. We discussed work-up for NPH including high volume LP. Patient should also get non-urgent MRI brain scan, which can be done inpatient or outpatient. Family would like to follow-up in clinic first before deciding whether to pursue high volume LP.     Plan:  - Non urgent MRI brain without contrast (can be done outpatient if needed)  - Follow-up in general neurology clinic 1-2 months after discharge (will arrange)  - Please call with questions    Venkat Jason MD   of Neurology  [facility]            Chief Complaint:   Eye redness          History of Present Illness:   This patient is a 74 year old female who presents with right eye redness and light sensitivity. Neurology is consulted for more chronic concerns of cognitive changes, imbalance in the setting of ventriculomegaly seen on CT head. Cognitive changes and imbalance have developed in the last few years. More recently in the last year patient has started using walker. She has falls. Patient lives alone but gets assistance from family with some meals and finances. She no longer drives. She doesn't take any medications at home. Patient  also has had issues with urinary incontinence for some time. Daughter thinks part of it is she cannot make it to the bathroom in time.           Past Medical History:     Past Medical History:   Diagnosis Date    Hypertension        "       Past Surgical History:     Past Surgical History:   Procedure Laterality Date    COLONOSCOPY N/A 8/30/2021    Procedure: Colonoscopy, With Polypectomy And Biopsy;  Surgeon: Federico Vital MD;  Location:  GI             Social History:     Social History     Tobacco Use    Smoking status: Every Day    Smokeless tobacco: Never    Tobacco comments:     starting smoking at age 26   Substance Use Topics    Alcohol use: Yes             Family History:     Family History   Problem Relation Age of Onset    Diabetes Mother         Type II    Thyroid Cancer Mother     Colon Cancer No family hx of             Allergies:     Allergies   Allergen Reactions    Penicillins      Swelling of arms and legs             Medications:     Medications Prior to Admission   Medication Sig Dispense Refill Last Dose    ibuprofen (ADVIL/MOTRIN) 200 MG capsule Take 400 mg by mouth every 6 hours as needed for moderate pain or mild pain.   10/18/2024    polymixin b-trimethoprim (POLYTRIM) 41130-7.1 UNIT/ML-% ophthalmic solution Place 1-2 drops into the right eye every 4 hours. 10 mL 0 10/17/2024             Review of Systems:   As above          Physical Exam:   Vital signs:  Temp: 98.6  F (37  C) Temp src: Oral BP: (!) 148/109 Pulse: 62   Resp: 18 SpO2: 98 % O2 Device: None (Room air)   Height: 170.2 cm (5' 7\") Weight: 68.1 kg (150 lb 1.6 oz)  Estimated body mass index is 23.51 kg/m  as calculated from the following:    Height as of this encounter: 1.702 m (5' 7\").    Weight as of this encounter: 68.1 kg (150 lb 1.6 oz).  General: Seated comfortably in no acute distress.  Lungs: breathing comfortably  Neurologic:     Mental Status: Fully alert, attentive. Not able to say the month at first, but not it was fall 2024. Oriented to person and place. 0/3 on delayed recall. Some details in describing all the details of her presentation to the hospital. Knows that Denisse and Nik are running for president. Language normal, speech clear and " fluent, no paraphasic errors.      Cranial Nerves: Visual fields intact. PERRL. EOMI with normal smooth pursuit. Facial sensation intact/symmetric. Facial movements symmetric. Hearing not formally tested but intact to conversation. Palate elevation symmetric, uvula midline. No dysarthria. Shoulder shrug strong bilaterally. Tongue protrusion midline.     Motor: No tremors or other abnormal movements observed. Muscle tone normal throughout with some paratonia. Grossly normal/symmetric rapid finger tapping. Strength 5/5 throughout upper and lower extremities.     Deep Tendon Reflexes: 2+/symmetric throughout upper and lower extremities. No clonus. Toes downgoing bilaterally.     Sensory: Intact/symmetric to light touch, temperature, vibration and proprioception throughout upper and lower extremities.     Coordination: Finger-nose-finger and heel-shin intact without dysmetria.      Gait: Slow, shuffling, magnetic gait with use of walker.           Data:   CT head 10/2024  IMPRESSION:   1. No acute intracranial pathology.   2. Advanced chronic small vessel ischemic disease and cerebral  atrophy.  3. Ventriculomegaly with effacement of cerebral sulci. This may  represent normal pressure hydrocephalus in the appropriate clinical  setting.  Personally reviewed and agree with above impression        Venkat Jason MD

## 2024-10-19 NOTE — CONSULTS
"  OPHTHALMOLOGY CONSULT NOTE      Patient: Rolanda Molina      ASSESSMENT/PLAN:     Rolanda Molina is a 74 year old female who presents with     # Markedly elevated IOP with NLP vision, right eye   # Subacute angle closure suspected, right eye   # Corneal edema, right eye   - NLP vision is of unknown duration, though symptom onset has been about one week. This patient only presented complaining of blurriness with no light perception vision found on objective testing  - Not in a state of significant pain at this time in spite of elevated pressures, complains only of \"scratchiness\"  - Given multiple rounds of maximum topical therapy and Diamox 500 mg IV, without significant response in intraocular pressure. IOP remains above reportable range on Tonopen  - Head CT without retro-orbital hemorrhage. B-scan with no vitreous debris  - Assessment: This is a mixed picture - acute pressure elevation to this degree would normally be exquisitely painful and her corneal edema suggests acuity. Therefore, there is likely an acute on chronic component to this presentation given the severity of vision loss and the discrepancy between the subjective complaint: \"blurred vision with minimal discomfort;\" and objective findings: NLP vision, corneal edema, and elevated IOP beyond readable range by Tonopen.  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 Considered mannitol IV versus procedural/surgical intervention to lower the pressure, but there does not seem to be a likelihood of visual recovery in this case given delayed presentation with symptom onset over one week ago - risk therefore would outweigh benefit.       # Ocular hypertension, left eye  # Hyperopia, left eye  - IOP elevated to 32-38 10/18/24 by Tonopen  - C:D 0.4, no visual field loss by confrontation        10/19: " Va remains NLP right eye, left eye with 20/20  vision. IOP 32 right eye and 15 left eye (Improved both eyes). Anterior segment exam notable for corneal edema, 2+ injection, and mid-dilated pupil in the right eye. Left eye appears white and quiet on exam.    RECOMMENDATIONS:  - Continue Cosopt BID, both eyes  - Continue Brimonidine 0.2% TID, both eyes  - Continue Latanoprost at bedtime, both eyes  - Continue Diamox Sequels 250 mg BID PO  - Full time glasses wear, monocular precautions  - Follow up in acute clinic Monday for repeat evaluation and possible Laser peripheral iridotomy    It is our pleasure to participate in this patient's care and treatment. Please contact us with any further questions or concerns.      Yessi Jay MD  Resident Physician, PGY-2  Department of Ophthalmology      HISTORY OF PRESENTING ILLNESS:     Rolanda Molina is a 74 year old female with history of hyperopia who presents for evaluation of vision loss and redness in right eye.    She was seen in the ED on 10/7 and diagnosed with an acute bacterial conjunctivitis of the right eye. Intraocular pressure is not recorded from that visit. She used topical antibiotic eyedrops for the past 10 days without significant relief of right eye redness. Within the past few days she began to notice vision in the right eye was declining, but she did not immediately seek care. Notable, she states she had some headaches during this period but she did not have severe eye pain, nausea, or vomiting.    She does not have a personal history of glaucoma. She does not take any medications with anticholinergic properties or suspects for precipitating angle closure. She states that she had a mother and sister with glaucoma but neither of them went blind.     She was found to have quite elevated blood pressure today, with systolics in the 200-210 range. Daughter also states she is more confused than usual.      10/19/24: Patient subjectively feels like right  eye is more comfortable today than yesterday. Reports no vision improvement in eight eye. Left eye is pain free and vision is at baseline.    Review of systems were otherwise negative except for that which has been stated above.      OCULAR/MEDICAL/SURGICAL HISTORIES:     Past Ocular History:  Last eye exam: 2023  Prior eye surgery/laser: None  Contact lens wear: None  Glasses: None  Eyedrops: None    Family History:  Mother and sister with glaucoma    Social History:  Noncontributory    Past Medical History:   Diagnosis Date    Hypertension        Past Surgical History:   Procedure Laterality Date    COLONOSCOPY N/A 8/30/2021    Procedure: Colonoscopy, With Polypectomy And Biopsy;  Surgeon: Federico Vital MD;  Location:  GI       EXAMINATION:     Base Eye Exam       Visual Acuity (Snellen - Linear)         Right Left    Near sc NLP     Near cc  20/20              Tonometry (Tonopen, 4:50 PM)         Right Left    Pressure 32 15              Extraocular Movement         Right Left     Full Full              Neuro/Psych       Oriented x3: Yes    Mood/Affect: Normal                  Additional Tests       Color         Right Left    [place]  11/11                  Slit Lamp and Fundus Exam       External Exam         Right Left    External Protective ptosis Normal              Slit Lamp Exam         Right Left    Lids/Lashes 2+ Dermatochalasis 2+ Dermatochalasis    Conjunctiva/Sclera 2+ injection White and quiet    Cornea Arcus Senilis, diffuse coneal edema Arcus Senilis    Anterior Chamber Deep centrally, shallow peripherally, no obvious hypopyon Deep and quiet    Iris Mid-fixed dilated Round and reactive    Lens 2-3+ Nuclear sclerosis cataract, 2+ Cortical cataract 1+ Nuclear sclerosis cataract, 2+ Cortical cataract                    Labs/Studies/Imaging Performed:    Results for orders placed or performed during the hospital encounter of 10/18/24   CT Head w/o Contrast    Impression    IMPRESSION:   1. No  acute intracranial pathology.   2. Advanced chronic small vessel ischemic disease and cerebral  atrophy.  3. Ventriculomegaly with effacement of cerebral sulci. This may  represent normal pressure hydrocephalus in the appropriate clinical  setting.    TODD BENITEZ MD         SYSTEM ID:  [id number]   XR Chest 2 Views    Impression    IMPRESSION: No acute cardiopulmonary process.    MIKE LIND MD         SYSTEM ID:  [id number]        Yessi Jay MD  Ophthalmology PGY-2   [facility]

## 2024-10-19 NOTE — PROGRESS NOTES
"   10/19/24 1149   Appointment Info   Signing Clinician's Name / Credentials (OT) Di Dickerson OTR/L   Living Environment   People in Home alone   Current Living Arrangements apartment   Home Accessibility no concerns  (elevator)   Transportation Anticipated family or friend will provide   Living Environment Comments pt lives alone, reports her son comes 1-2x/week, gives her rides to get groceries; pt reports her dtr lives locally and is supportive, and that one of her children could stay if needed   Self-Care   Usual Activity Tolerance moderate   Current Activity Tolerance fair   Equipment Currently Used at Home walker, rolling;shower chair   Fall history within last six months yes   Number of times patient has fallen within last six months 1   Activity/Exercise/Self-Care Comment Pt reports indep with I/ADLs, has recently started using her 4ww regularly, could walk close to a block a few weeks ago   Instrumental Activities of Daily Living (IADL)   Previous Responsibilities meal prep;housekeeping;laundry;shopping;medication management;finances   IADL Comments pt's son drives pt to the grocery store and for errands; pt's laundry is down the [place]   General Information   Onset of Illness/Injury or Date of Surgery 10/18/24   Referring Physician Conner Adamson, DO   Patient/Family Therapy Goal Statement (OT) home, hoping for today   Additional Occupational Profile Info/Pertinent History of Current Problem per chart: This is a very kind 74-year-old -American female presented to the [facility] with right eye redness and light sensitivity.  Patient's daughter is present at bedside and reports that patient is \"not acting right\" and is confused.  Patient has a pertinent chronic past medical history positive for hypertension.  No PTA antihypertensive regimen identified via medication reconciliation.  Upon evaluation in the emergency department patient's systolic blood pressure was found to " "be over 200 mmHg.  Patient answers questions appropriately.  Patient reports her right eye vision is blurry.  Ophthalmology is actively evaluating patient and providing recommendations.  Of note, CT head demonstrates ventriculomegaly with effacement of cerebral sulci.  This may represent normal pressure hydrocephalus in the appropriate clinical setting.  Patient will be admitted to the internal medicine hospitalist service for further evaluation and management.   Existing Precautions/Restrictions fall   Cognitive Status Examination   Orientation Status orientation to person, place and time   Affect/Mental Status (Cognitive) WFL   Follows Commands follows one-step commands;over 90% accuracy;follows two-step commands;75-90% accuracy   Cognitive Status Comments pt feels she is at baseline, she says her dtr thinks she has memory problems   Cognitive Screens/Assessments   Cognitive Assessments Completed Other Cognitive Screen/Assessment   Cognitive Assessment Test Short Blessed Test   Cognitive Assessment Test Score 8/28   Cognitive Assessment Test Interpretation Pt scores in the \"normal to minimal impairment\" range of this screen, she would benefit from further testing.   Visual Perception   Visual Impairment/Limitations blurry vision  (R eye)   Pain Assessment   Patient Currently in Pain No   Posture   Posture forward head position;protracted shoulders   Range of Motion Comprehensive   Comment, General Range of Motion WFL   Strength Comprehensive (MMT)   Comment, General Manual Muscle Testing (MMT) Assessment pt demonstrated antigravity strength, decreased functional activity tolerance   Coordination   Upper Extremity Coordination No deficits were identified   Bed Mobility   Bed Mobility supine-sit;sit-supine   Supine-Sit [place] (Bed Mobility) contact guard   Sit-Supine [place] (Bed Mobility) supervision   Assistive Device (Bed Mobility) bed rails   Transfers   Transfers sit-stand transfer;toilet " transfer;shower transfer   Sit-Stand Transfer   Sit-Stand [place] (Transfers) contact guard;verbal cues   Assistive Device (Sit-Stand Transfers) walker, 4-wheeled   Shower Transfer   [place] Level (Shower Transfer) contact guard   Shower Transfer Comments per clinical judgement, WIS w/shower chair (pt does not generally use chair)   Toilet Transfer   [place] Level (Toilet Transfer) supervision   Assistive Device (Toilet Transfer) grab bars/safety frame;walker, 4-wheeled   Balance   Balance Comments good seated; good ambulating in room with 4WW   Activities of Daily Living   BADL Assessment/Intervention lower body dressing;toileting   Lower Body Dressing Assessment/Training   [place] Level (Lower Body Dressing) supervision   Toileting   [place] Level (Toileting) supervision   Clinical Impression   Criteria for Skilled Therapeutic Interventions Met (OT) Yes, treatment indicated   OT Diagnosis impaired ADLs   OT Problem List-Impairments impacting ADL problems related to;activity tolerance impaired;balance;cognition;strength;vision   Assessment of Occupational Performance 5 or more Performance Deficits   Identified Performance Deficits endurance for home chores, medication management, vision for safety with cooking   Planned Therapy Interventions (OT) ADL retraining;home program guidelines   Clinical Decision Making Complexity (OT) detailed assessment/moderate complexity   Risk & Benefits of therapy have been explained evaluation/treatment results reviewed;patient   OT Total Evaluation Time   OT Eval, Moderate Complexity Minutes (85731) 12   OT Goals   Therapy Frequency (OT) Daily   OT Predicted Duration/Target Date for Goal Attainment 10/25/24   OT Goals Transfers;Cognition;OT Goal 1;OT Goal 2   OT: Transfer Supervision/stand-by assist;with assistive device   OT: Cognitive Patient/caregiver will verbalize understanding of cognitive assessment results/recommendations as needed for safe discharge  "planning   OT: Goal 1 SBA functional ambulation for household distances, using AD prn   OT: Goal 2 Pt will identify 3 principles to assist with low vision for ADLs   Self-Care/Home Management   Self-Care/Home Mgmt/ADL, Compensatory, Meal Prep Minutes (86072) 27   Treatment Detail/Skilled Intervention Increased effort for supine > sit. 2 attempts to stand from low bed height to 4SS. Pt easily achieves fig-4 pattern for doff/don B slipper socks.  Cued for toilet transfer, pt completes SBA with heavy grab bar use. SBA in hallway using 4WW, pt tends to veer to L.  Cued for straigth path, upright posture; pt uses walker brakes consistently t/o session. Ed pt in low vision strategies, handouts issued.  Ed pt in results of cognitive screen and importance of 24/7 supervision currently, pt endorses her children will stay with her \"cause I'm the mom\".  Pt walks ~50'x2 in session, limited by fatigue. BP taken supine, 148/63 pre activity.  RN okays activity. RN notified of session.   OT Discharge Planning   OT Plan cognitive testing (ace?), continue with vision screen and go through low vision handouts (in room), progress mobility   OT Discharge Recommendation (DC Rec) home with home care occupational therapy;other (see comments)  (HHOT and HHPT)   OT Rationale for DC Rec Pt below baseline, limited by new vision changes and mild cognitive changes.  Anticipate she will be able to discharge to home with 24/7 supervision and HHOT/PT.  If family not able to accomodate 24/7, would recommend tcu to maximize safety and independence with ADLs, as pt lives alone.   OT Brief overview of current status SBA in hallway w/4WW, SBT 8/28 (mild cognitive changes), blurriness R eye   Total Session Time   Timed Code Treatment Minutes 27   Total Session Time (sum of timed and untimed services) 39     "

## 2024-10-19 NOTE — PLAN OF CARE
Goal Outcome Evaluation:      Plan of Care Reviewed With: patient    Overall Patient Progress: no changeOverall Patient Progress: no change    Outcome Evaluation: Patient rested throughout the shift. Blood pressure measured Q4. Eye drops administered per MAR.    A&Ox4. CMS intact. Denies numbness/tingling, nausea,vomiting, SOB, and chest pain.  Up w/SBA walker. voiding adequate amounts.   Reports right eye has some vision, however is blurry. Denies pain.  Continue with POC.

## 2024-10-20 ENCOUNTER — APPOINTMENT (OUTPATIENT)
Dept: CT IMAGING | Facility: CLINIC | Age: 74
DRG: 124 | End: 2024-10-20
Attending: STUDENT IN AN ORGANIZED HEALTH CARE EDUCATION/TRAINING PROGRAM
Payer: COMMERCIAL

## 2024-10-20 ENCOUNTER — APPOINTMENT (OUTPATIENT)
Dept: OCCUPATIONAL THERAPY | Facility: CLINIC | Age: 74
DRG: 124 | End: 2024-10-20
Attending: OPHTHALMOLOGY
Payer: COMMERCIAL

## 2024-10-20 ENCOUNTER — APPOINTMENT (OUTPATIENT)
Dept: MRI IMAGING | Facility: CLINIC | Age: 74
DRG: 124 | End: 2024-10-20
Attending: STUDENT IN AN ORGANIZED HEALTH CARE EDUCATION/TRAINING PROGRAM
Payer: COMMERCIAL

## 2024-10-20 LAB — RADIOLOGIST FLAGS: NORMAL

## 2024-10-20 PROCEDURE — 70498 CT ANGIOGRAPHY NECK: CPT

## 2024-10-20 PROCEDURE — 70498 CT ANGIOGRAPHY NECK: CPT | Mod: 26 | Performed by: RADIOLOGY

## 2024-10-20 PROCEDURE — 70496 CT ANGIOGRAPHY HEAD: CPT | Mod: 26 | Performed by: RADIOLOGY

## 2024-10-20 PROCEDURE — 255N000002 HC RX 255 OP 636: Performed by: STUDENT IN AN ORGANIZED HEALTH CARE EDUCATION/TRAINING PROGRAM

## 2024-10-20 PROCEDURE — 120N000002 HC R&B MED SURG/OB UMMC

## 2024-10-20 PROCEDURE — 250N000013 HC RX MED GY IP 250 OP 250 PS 637: Performed by: STUDENT IN AN ORGANIZED HEALTH CARE EDUCATION/TRAINING PROGRAM

## 2024-10-20 PROCEDURE — 250N000013 HC RX MED GY IP 250 OP 250 PS 637: Performed by: INTERNAL MEDICINE

## 2024-10-20 PROCEDURE — 99233 SBSQ HOSP IP/OBS HIGH 50: CPT | Performed by: STUDENT IN AN ORGANIZED HEALTH CARE EDUCATION/TRAINING PROGRAM

## 2024-10-20 PROCEDURE — 250N000011 HC RX IP 250 OP 636: Performed by: STUDENT IN AN ORGANIZED HEALTH CARE EDUCATION/TRAINING PROGRAM

## 2024-10-20 PROCEDURE — 250N000013 HC RX MED GY IP 250 OP 250 PS 637

## 2024-10-20 PROCEDURE — A9585 GADOBUTROL INJECTION: HCPCS | Performed by: STUDENT IN AN ORGANIZED HEALTH CARE EDUCATION/TRAINING PROGRAM

## 2024-10-20 PROCEDURE — 70553 MRI BRAIN STEM W/O & W/DYE: CPT

## 2024-10-20 PROCEDURE — 70496 CT ANGIOGRAPHY HEAD: CPT

## 2024-10-20 PROCEDURE — 70553 MRI BRAIN STEM W/O & W/DYE: CPT | Mod: 26 | Performed by: RADIOLOGY

## 2024-10-20 PROCEDURE — 97535 SELF CARE MNGMENT TRAINING: CPT | Mod: GO | Performed by: OCCUPATIONAL THERAPIST

## 2024-10-20 RX ORDER — GADOBUTROL 604.72 MG/ML
0.1 INJECTION INTRAVENOUS ONCE
Status: COMPLETED | OUTPATIENT
Start: 2024-10-20 | End: 2024-10-20

## 2024-10-20 RX ORDER — ATORVASTATIN CALCIUM 40 MG/1
40 TABLET, FILM COATED ORAL EVERY EVENING
Status: DISCONTINUED | OUTPATIENT
Start: 2024-10-20 | End: 2024-10-24 | Stop reason: HOSPADM

## 2024-10-20 RX ORDER — IOPAMIDOL 755 MG/ML
100 INJECTION, SOLUTION INTRAVASCULAR ONCE
Status: COMPLETED | OUTPATIENT
Start: 2024-10-20 | End: 2024-10-20

## 2024-10-20 RX ORDER — ASPIRIN 81 MG/1
81 TABLET, CHEWABLE ORAL DAILY
Status: DISCONTINUED | OUTPATIENT
Start: 2024-10-20 | End: 2024-10-24 | Stop reason: HOSPADM

## 2024-10-20 RX ORDER — CLOPIDOGREL BISULFATE 75 MG/1
75 TABLET ORAL DAILY
Status: DISCONTINUED | OUTPATIENT
Start: 2024-10-20 | End: 2024-10-24 | Stop reason: HOSPADM

## 2024-10-20 RX ADMIN — IOPAMIDOL 67 ML: 755 INJECTION, SOLUTION INTRAVENOUS at 19:27

## 2024-10-20 RX ADMIN — LOSARTAN POTASSIUM 25 MG: 25 TABLET, FILM COATED ORAL at 08:54

## 2024-10-20 RX ADMIN — CLOPIDOGREL BISULFATE 75 MG: 75 TABLET ORAL at 15:02

## 2024-10-20 RX ADMIN — ACETAZOLAMIDE 250 MG: 250 TABLET ORAL at 08:54

## 2024-10-20 RX ADMIN — GADOBUTROL 6.81 ML: 604.72 INJECTION INTRAVENOUS at 12:46

## 2024-10-20 RX ADMIN — BRIMONIDINE TARTRATE 1 DROP: 2 SOLUTION/ DROPS OPHTHALMIC at 19:53

## 2024-10-20 RX ADMIN — ASPIRIN 81 MG CHEWABLE TABLET 81 MG: 81 TABLET CHEWABLE at 13:32

## 2024-10-20 RX ADMIN — ACETAZOLAMIDE 250 MG: 250 TABLET ORAL at 15:02

## 2024-10-20 RX ADMIN — DORZOLAMIDE HYDROCHLORIDE AND TIMOLOL MALEATE 1 DROP: 20; 5 SOLUTION OPHTHALMIC at 19:53

## 2024-10-20 RX ADMIN — BRIMONIDINE TARTRATE 1 DROP: 2 SOLUTION/ DROPS OPHTHALMIC at 08:54

## 2024-10-20 RX ADMIN — DORZOLAMIDE HYDROCHLORIDE AND TIMOLOL MALEATE 1 DROP: 20; 5 SOLUTION OPHTHALMIC at 08:54

## 2024-10-20 RX ADMIN — BRIMONIDINE TARTRATE 1 DROP: 2 SOLUTION/ DROPS OPHTHALMIC at 13:16

## 2024-10-20 RX ADMIN — ATORVASTATIN CALCIUM 40 MG: 40 TABLET, FILM COATED ORAL at 19:53

## 2024-10-20 ASSESSMENT — ACTIVITIES OF DAILY LIVING (ADL)
ADLS_ACUITY_SCORE: 33
DEPENDENT_IADLS:: INDEPENDENT
ADLS_ACUITY_SCORE: 33

## 2024-10-20 NOTE — PLAN OF CARE
Goal Outcome Evaluation:      Plan of Care Reviewed With: patient    Overall Patient Progress: no changeOverall Patient Progress: no change    Care Management Assessment completed for discharge planning. Pt anticipates discharge to TCU.

## 2024-10-20 NOTE — CONSULTS
Care Management Initial Consult    General Information  Assessment completed with: Patient, Children, Other (sister), Bushra Orantes Lola  Type of CM/SW Visit: Initial Assessment  Primary Care Provider verified and updated as needed: Yes   Readmission within the last 30 days: no previous admission in last 30 days      Reason for Consult: discharge planning  Advance Care Planning: Advance Care Planning Reviewed: no concerns identified          Communication Assessment  Patient's communication style: spoken language (English or Bilingual)    Hearing Difficulty or Deaf: no   Wear Glasses or Blind: no    Cognitive  Cognitive/Neuro/Behavioral: WDL                      Living Environment:   People in home: alone     Current living Arrangements: apartment      Able to return to prior arrangements: yes       Family/Social Support:  Care provided by: self  Provides care for: no one  Marital Status: Single  Support system: Sibling(s), Children          Description of Support System: Supportive, Involved    Support Assessment: Adequate family and caregiver support    Current Resources:   Patient receiving home care services: No  Community Resources: None  Equipment currently used at home: walker, rolling, shower chair  Supplies currently used at home: None    Employment/Financial:  Employment Status: retired     Financial Concerns: none   Referral to Financial Worker: No       Does the patient's insurance plan have a 3 day qualifying hospital stay waiver?  No    Lifestyle & Psychosocial Needs:  Social Determinants of Health     Food Insecurity: Low Risk  (10/18/2024)    Food Insecurity     Within the past 12 months, did you worry that your food would run out before you got money to buy more?: No     Within the past 12 months, did the food you bought just not last and you didn t have money to get more?: No   Depression: Not at risk (6/21/2021)    PHQ-2     PHQ-2 Score: 0   Housing Stability: Low Risk  (10/18/2024)     Housing Stability     Do you have housing? : Yes     Are you worried about losing your housing?: No   Tobacco Use: High Risk (10/7/2024)    Patient History     Smoking Tobacco Use: Every Day     Smokeless Tobacco Use: Never     Passive Exposure: Not on file   Financial Resource Strain: Low Risk  (10/18/2024)    Financial Resource Strain     Within the past 12 months, have you or your family members you live with been unable to get utilities (heat, electricity) when it was really needed?: No   Alcohol Use: Not on file   Transportation Needs: High Risk (10/18/2024)    Transportation Needs     Within the past 12 months, has lack of transportation kept you from medical appointments, getting your medicines, non-medical meetings or appointments, work, or from getting things that you need?: Yes   Physical Activity: Not on file   Interpersonal Safety: Low Risk  (10/18/2024)    Interpersonal Safety     Do you feel physically and emotionally safe where you currently live?: Yes     Within the past 12 months, have you been hit, slapped, kicked or otherwise physically hurt by someone?: No     Within the past 12 months, have you been humiliated or emotionally abused in other ways by your partner or ex-partner?: No   Stress: Not on file   Social Connections: Not on file   Health Literacy: Not on file       Functional Status:  Prior to admission patient needed assistance:   Dependent ADLs:: Ambulation-walker  Dependent IADLs:: Independent  Assesssment of Functional Status: Not at baseline with ADL Functioning    Mental Health Status:  Mental Health Status: No Current Concerns       Chemical Dependency Status:  Chemical Dependency Status: No Current Concerns             Values/Beliefs:  Spiritual, Cultural Beliefs, Lutheran Practices, Values that affect care: no               Discussed  Partnership in Safe Discharge Planning  document with patient/family: No    Additional Information:  Care Management consulted by rehab regarding  patient's need for TCU. Patient was originally to go home with HHOT, however will not have the support at home to make this plan safe.     SW met with patient and her daughters to discuss recommendation for TCU. Patient was initially very resistant to TCU - she states her sons occasionally check on her. SW asked what would happen if she fell and no one was there. Patient responded she would lay there until she'd be visited by one of her sons again. Patient's daughters both agree this is not a safe plan. Patient said that she'd like a PCA. SW discussed the process to get a PCA and how long that process can be, and that patient will be medically ready to go much before that ball would start rolling. Patient and daughters agree that a PCA is something that can be worked on as a long term goal at this point.     One of patient's daughters works in a TCU and was able to convince patient to have a referral made to where she worked. SW left family with Medicare Care and Compare list to look over and discuss further.     SW returned later to gather additional TCU choices. Referrals made. Family is willing to give patient a ride to the accepting facility due to out of pocket costs with medical transportation.    Pending Referrals:    [facility]  [address] [place] [address]  [phone]  10/20: initial referral made    [facility]  [address] [place] [address]  [phone]  10/20: initial referral made    [facility]  [address] [place] [address]  [phone]  10/20: initial referral made    [facility]  [address] [place] [address]  [phone]  10/20: initial referral made    [facility]  [address] [place] [address]  [phone]  10/20: initial referral made    [facility]  [address] [place] [address]  [phone]  10/20: initial referral made    Next Steps:   - follow for safe discharge planning and TCU placement  - IMM  - IHO    ERNIE Edwards   10/20/2024       Social Work and Care Management Department       SEARCHABLE in [facility] - search SOCIAL WORK       [place] (0800 - 1630) Saturday and Sunday     Units: 4A Vocera, 4C Vocera, & 4E Vocera        Units: 5A 4193-5927 Vocera, 5A 7590-3747 Vocera , BMT SW 1 BMT SW 2, BMT SW 3 & BMT SW 4  5C Off Service 5401 - 5416  5C Off Service [phone]     Units: 6A Vocera & 6B Vocera      Units: 6C Vocera     Units: 7A Vocera & 7B Vocera      Units: 7C Med Surg 7401 thru 7418 and 7C Med Surg 7502 thru 7521      Unit: [facility] ED Vocera & [facility] Obs Vocera     [facility] ([phone]) Saturday and Sunday      Units: 5 Ortho Vocera, 5 Med Surg Vocera & WB ED Vocera     Units: 6 Med Surg Vocera, 8 Med Surg Vocera, & 10 ICU Vocera      After hours Vocera [facility] and After Hours Vocera [facility]     Please NOTE changes to times below:    **Saturday & Sunday (1630 - 2030)    **Mon-Fri (3361-9870)     **[facility] Recognized Holidays  ([id number])    Units: ALL   - see above VOCERA links to units

## 2024-10-20 NOTE — PROGRESS NOTES
Brief progress note:     MRI brain was reviewed, which showed multifocal subacute ischemic strokes (corpus callosum, left frontal, right parietal). I would recommend the following based off of this finding.    Recommendations:   - Continue aspirin 81 mg daily  - Start Plavix 75 daily for 3 weeks followed by aspirin monotherapy (may consider longer course pending CTA)  - Start Lipitor 40 mg daily   - CTA head and neck  - TTE  - Serum lipid panel, A1c, PTT, INR  - Cardiology telemetry while admitted  - Consider zio patch at discharge pending above findings    Venkat Jason MD   of Neurology  [facility]

## 2024-10-20 NOTE — PLAN OF CARE
Goal Outcome Evaluation:      Plan of Care Reviewed With: patient    Overall Patient Progress: no changeOverall Patient Progress: no change      Outcome Evaluation:   A&Ox4. CMS intact. Denies numbness/tingling, nausea,vomiting, SOB, and chest pain.Up A1 w/SBA walker. voiding adequate amounts. Incontinent of bladder.  Reports right eye has some vision, however is blurry. Denies pain. Patient showered and rested comfortably throughout night.  Continue with POC.

## 2024-10-20 NOTE — PROGRESS NOTES
[facility]    Medicine Progress Note - Hospitalist Service, GOLD TEAM 21    Date of Admission:  10/18/2024    Assessment & Plan   Rolanda Molina is a 74 year old female with no significant past medical history who presents with R eye redness and light sensitivity as well as intermittent confusion. 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     Markedly elevated IOP with NLP vision, right eye   Subacute angle closure glaucoma suspected, right eye   Corneal edema, right eye   Symptoms had been ongoing for ~1 week with redness and light sensitivity. Denies pain. Seen by ophthalmology in the ED and found to have significant elevated pressures in her R eye with corneal edema. However, given the pt is not in significant pain, likely an acute on chronic picture. Eye pressure did not respond to IV diamox. Given the duration of symptoms, ophthalmology felt the risks of surgery or IV mannitol outweighed the benefits as it is unclear if she will have visual recovery.  - Cosopt BID, both eyes  - Brimonidine 0.2% TID, both eyes  - Latanoprost at bedtime, both eyes  - Diamox Sequels 250 mg BID PO  - Full time glasses wear, monocular precautions  - Ophthalmology consulted  - OT: recommending home with 24/7 supervision and HH/OT/PT. If not able to be accomodated by family, they would recommend TCU to maximize safety and independence.    Multifocal subacute CVA  MRI brain obtained inpatient due to abnormal head CT and incidentally noted multiple punctate subacute infarcts. Pt has had some intermittent memory issues over past few months per daughter but no other significant changes. No known history of afib. Was not on any medications prior to admission.  - Neurology consulted  - CTA head and neck  - Start aspirin 81mg  - Start plavix 75mg for 3 weeks followed by aspirin monotherapy  - Telemetry  - TTE  - Lipid panel, A1c, INR, PTT in AM  - Ziopatch at discharge    Hypertensive  "urgency - improved  - Decrease losartan 12.5 mg p.o. daily  - Continue to closely monitor blood pressure    Intermittent confusion  Ventriculomegaly with effacement of cerebral sulci  Consider normal pressure hydrocephalus  Pts daughter reporting several months of intermittent confusion and memory issues. States they will be having a conversation and she will forget something they just talked about. Will recover to baseline quickly and she feels that she is currently at her baseline. One fall in past few months that was a mechanical fall out of bed. CT imaging in ED with \"Ventriculomegaly with effacement of cerebral sulci. This may represent normal pressure hydrocephalus in the appropriate clinical setting.\"  - Neurology consultation  - MRI brain  - Follow up in neuro clinic          Diet: Combination Diet Regular Diet Adult    DVT Prophylaxis: Pneumatic Compression Devices  Ornelas Catheter: Not present  Lines: None     Cardiac Monitoring: None  Code Status: Full Code      Clinically Significant Risk Factors                   # Hypertension: Noted on problem list                      Disposition Plan     Medically Ready for Discharge: Anticipated in 2-4 Days             Mary Beth Kimble MD  Hospitalist Service, GOLD TEAM [address] [place] [address]  Securely message with [employer] (more info)  Text page via [employer] Paging/Directory   See signed in provider for up to date coverage information  ______________________________________________________________________    Interval History   No acute events overnight. She does not feel like she's had any significant changes to her vision. No pain. Appetite good.    Physical Exam   Vital Signs: Temp: 97.9  F (36.6  C) Temp src: Oral BP: 130/89 Pulse: 72   Resp: 18 SpO2: 100 % O2 Device: None (Room air)    Weight: 150 lbs 1.6 oz    General Appearance: NAD. Lying comfortably in bed. Conjunctival injection of R eye, unchanged.  Respiratory: CTAB. " No increased WOB.  Cardiovascular: RRR. No m/r/g  GI: Soft. Non-tender. Non-distended.  Skin: No rash.  Other: AOx4. Moving all extremities. Normal affect.    Medical Decision Making       55 MINUTES SPENT BY ME on the date of service doing chart review, history, exam, documentation & further activities per the note.      Data

## 2024-10-20 NOTE — PLAN OF CARE
"Goal Outcome Evaluation:      Plan of Care Reviewed With: patient    Overall Patient Progress: no change    Outcome Evaluation: Pt got MRI done today. Ophthamology appt tomorrow at [facility].      VS:       Pt A/O X 4. Confused at times. Afebrile. VSS. BP (!) 124/103 (BP Location: Right arm)   Pulse 65   Temp 98.5  F (36.9  C) (Oral)   Resp 17   Ht 1.702 m (5' 7\")   Wt 68.1 kg (150 lb 1.6 oz)   SpO2 100%   BMI 23.51 kg/m    Lungs- clear bilaterally with both anterior and posterior. Denies nausea, shortness of breath, and chest pain.     Output:       Bowels- active in all four quadrants. Voids spontaneously without difficulty in the bathroom. Incontinent at times.      Activity:       Pt up assist x1 with walker.     Skin:   Pt refused full skin assessment, all visible skin appears to be intact.     Pain:       Denies pain at this time.     CMS:       CMS and Neuro's are intact. Denies numbness and tingling in all extremities.      Dressing:       No incisional dressing.     Diet:       Pt is on a regular diet and appetite was good this shift.       LDA:       PIV is patent in the L forearm and SL.      Equipment:       Bilateral heels are elevated off the bed. Walker from home in the room.     Plan:       Pt is able to make needs known and the call light is within the pt's reach. Continue to monitor.       Additional Info:              "

## 2024-10-21 ENCOUNTER — TELEPHONE (OUTPATIENT)
Dept: OPHTHALMOLOGY | Facility: CLINIC | Age: 74
End: 2024-10-21
Payer: COMMERCIAL

## 2024-10-21 ENCOUNTER — TELEPHONE (OUTPATIENT)
Dept: NEUROLOGY | Facility: CLINIC | Age: 74
End: 2024-10-21
Payer: COMMERCIAL

## 2024-10-21 ENCOUNTER — APPOINTMENT (OUTPATIENT)
Dept: OCCUPATIONAL THERAPY | Facility: CLINIC | Age: 74
DRG: 124 | End: 2024-10-21
Attending: OPHTHALMOLOGY
Payer: COMMERCIAL

## 2024-10-21 ENCOUNTER — APPOINTMENT (OUTPATIENT)
Dept: CARDIOLOGY | Facility: CLINIC | Age: 74
DRG: 124 | End: 2024-10-21
Attending: STUDENT IN AN ORGANIZED HEALTH CARE EDUCATION/TRAINING PROGRAM
Payer: COMMERCIAL

## 2024-10-21 LAB
APTT PPP: 27 SECONDS (ref 22–38)
CHOLEST SERPL-MCNC: 179 MG/DL
EST. AVERAGE GLUCOSE BLD GHB EST-MCNC: 126 MG/DL
HBA1C MFR BLD: 6 %
HDLC SERPL-MCNC: 46 MG/DL
INR PPP: 1.02 (ref 0.85–1.15)
LDLC SERPL CALC-MCNC: 104 MG/DL
LVEF ECHO: NORMAL
NONHDLC SERPL-MCNC: 133 MG/DL
TRIGL SERPL-MCNC: 145 MG/DL

## 2024-10-21 PROCEDURE — 255N000002 HC RX 255 OP 636: Performed by: INTERNAL MEDICINE

## 2024-10-21 PROCEDURE — 250N000013 HC RX MED GY IP 250 OP 250 PS 637: Performed by: PHYSICIAN ASSISTANT

## 2024-10-21 PROCEDURE — 85610 PROTHROMBIN TIME: CPT | Performed by: STUDENT IN AN ORGANIZED HEALTH CARE EDUCATION/TRAINING PROGRAM

## 2024-10-21 PROCEDURE — 999N000208 ECHOCARDIOGRAM COMPLETE

## 2024-10-21 PROCEDURE — 99233 SBSQ HOSP IP/OBS HIGH 50: CPT | Performed by: STUDENT IN AN ORGANIZED HEALTH CARE EDUCATION/TRAINING PROGRAM

## 2024-10-21 PROCEDURE — 97535 SELF CARE MNGMENT TRAINING: CPT | Mod: GO

## 2024-10-21 PROCEDURE — 83036 HEMOGLOBIN GLYCOSYLATED A1C: CPT | Performed by: STUDENT IN AN ORGANIZED HEALTH CARE EDUCATION/TRAINING PROGRAM

## 2024-10-21 PROCEDURE — 250N000009 HC RX 250

## 2024-10-21 PROCEDURE — 36415 COLL VENOUS BLD VENIPUNCTURE: CPT | Performed by: STUDENT IN AN ORGANIZED HEALTH CARE EDUCATION/TRAINING PROGRAM

## 2024-10-21 PROCEDURE — 93306 TTE W/DOPPLER COMPLETE: CPT | Mod: 26 | Performed by: INTERNAL MEDICINE

## 2024-10-21 PROCEDURE — 250N000013 HC RX MED GY IP 250 OP 250 PS 637

## 2024-10-21 PROCEDURE — 99233 SBSQ HOSP IP/OBS HIGH 50: CPT | Performed by: INTERNAL MEDICINE

## 2024-10-21 PROCEDURE — 120N000002 HC R&B MED SURG/OB UMMC

## 2024-10-21 PROCEDURE — 250N000013 HC RX MED GY IP 250 OP 250 PS 637: Performed by: INTERNAL MEDICINE

## 2024-10-21 PROCEDURE — 250N000013 HC RX MED GY IP 250 OP 250 PS 637: Performed by: STUDENT IN AN ORGANIZED HEALTH CARE EDUCATION/TRAINING PROGRAM

## 2024-10-21 PROCEDURE — 85730 THROMBOPLASTIN TIME PARTIAL: CPT | Performed by: STUDENT IN AN ORGANIZED HEALTH CARE EDUCATION/TRAINING PROGRAM

## 2024-10-21 PROCEDURE — 80061 LIPID PANEL: CPT | Performed by: STUDENT IN AN ORGANIZED HEALTH CARE EDUCATION/TRAINING PROGRAM

## 2024-10-21 RX ADMIN — ATORVASTATIN CALCIUM 40 MG: 40 TABLET, FILM COATED ORAL at 19:06

## 2024-10-21 RX ADMIN — Medication 2.5 MG: at 19:06

## 2024-10-21 RX ADMIN — BRIMONIDINE TARTRATE 1 DROP: 2 SOLUTION/ DROPS OPHTHALMIC at 08:46

## 2024-10-21 RX ADMIN — BRIMONIDINE TARTRATE 1 DROP: 2 SOLUTION/ DROPS OPHTHALMIC at 17:21

## 2024-10-21 RX ADMIN — ACETAZOLAMIDE 250 MG: 250 TABLET ORAL at 08:45

## 2024-10-21 RX ADMIN — CLOPIDOGREL BISULFATE 75 MG: 75 TABLET ORAL at 08:45

## 2024-10-21 RX ADMIN — ASPIRIN 81 MG CHEWABLE TABLET 81 MG: 81 TABLET CHEWABLE at 08:45

## 2024-10-21 RX ADMIN — ANTACID TABLETS 1000 MG: 500 TABLET, CHEWABLE ORAL at 18:12

## 2024-10-21 RX ADMIN — DORZOLAMIDE HYDROCHLORIDE AND TIMOLOL MALEATE 1 DROP: 20; 5 SOLUTION OPHTHALMIC at 08:42

## 2024-10-21 RX ADMIN — Medication 12.5 MG: at 08:46

## 2024-10-21 RX ADMIN — HUMAN ALBUMIN MICROSPHERES AND PERFLUTREN 6 ML: 10; .22 INJECTION, SOLUTION INTRAVENOUS at 09:24

## 2024-10-21 RX ADMIN — ACETAZOLAMIDE 250 MG: 250 TABLET ORAL at 15:47

## 2024-10-21 RX ADMIN — DORZOLAMIDE HYDROCHLORIDE AND TIMOLOL MALEATE 1 DROP: 20; 5 SOLUTION OPHTHALMIC at 19:08

## 2024-10-21 RX ADMIN — LATANOPROST 1 DROP: 50 SOLUTION OPHTHALMIC at 22:18

## 2024-10-21 RX ADMIN — BRIMONIDINE TARTRATE 1 DROP: 2 SOLUTION/ DROPS OPHTHALMIC at 19:07

## 2024-10-21 ASSESSMENT — ACTIVITIES OF DAILY LIVING (ADL)
ADLS_ACUITY_SCORE: 34
ADLS_ACUITY_SCORE: 35
ADLS_ACUITY_SCORE: 35
ADLS_ACUITY_SCORE: 34
ADLS_ACUITY_SCORE: 34
ADLS_ACUITY_SCORE: 35
ADLS_ACUITY_SCORE: 34
ADLS_ACUITY_SCORE: 35
ADLS_ACUITY_SCORE: 33
ADLS_ACUITY_SCORE: 34
ADLS_ACUITY_SCORE: 34
ADLS_ACUITY_SCORE: 33
ADLS_ACUITY_SCORE: 35
ADLS_ACUITY_SCORE: 33
ADLS_ACUITY_SCORE: 35

## 2024-10-21 NOTE — CARE PLAN
Spoke with [facility] transportation, pt has eye appointment 9th floor [facility] building 8 AM, confirmed  window [phone] needs facility WC with no pole, pt return ride scheduled for [phone]

## 2024-10-21 NOTE — TELEPHONE ENCOUNTER
Health Call Center    Phone Message    May a detailed message be left on voicemail: yes     Reason for Call: Appointment Intake    Referring Provider Name: Khai Mcintosh MD   Diagnosis and/or Symptoms: Acute bacterial conjunctivitis of right eye.  Deidra states the patient is supposed to be seen in the Eye clinic today, and she will need to arrange Transportation.  Please call.  Thank you.    Action Taken: Message routed to:  [facility] ([facility] Ophthalmology    Travel Screening: Not Applicable     Date of Service:

## 2024-10-21 NOTE — TELEPHONE ENCOUNTER
I called patients daughter and offered appointment 12/17/24 but she states she is unable to bring patient. We scheduled next available for her schedule 01/07/2025.

## 2024-10-21 NOTE — PROGRESS NOTES
[facility]    Medicine Progress Note - Hospitalist Service, GOLD TEAM 21    Date of Admission:  10/18/2024    Assessment & Plan   Rolanda Molina is a 74 year old female with no significant past medical history who presents with R eye redness and light sensitivity as well as intermittent confusion. 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     Changes Today:  - Continue stroke work up.  - Will discuss incidental findings of aneurysm on CTA with neurology to see if additional work up needs to occur inpatient.  - Ophthalmology appointment tmrw AM. Ride arranged.  - Will work with family on safe dispo plan as she will require 24h assist and help with her medications and eye drops on discharge.    Markedly elevated IOP with NLP vision, right eye   Subacute angle closure glaucoma suspected, right eye   Corneal edema, right eye   Symptoms had been ongoing for ~1 week with redness and light sensitivity. Denies pain. Seen by ophthalmology in the ED and found to have significant elevated pressures in her R eye with corneal edema. However, given the pt is not in significant pain, likely an acute on chronic picture. Eye pressure did not respond to IV diamox. Given the duration of symptoms, ophthalmology felt the risks of surgery or IV mannitol outweighed the benefits as it is unclear if she will have visual recovery.  - Cosopt BID, both eyes  - Brimonidine 0.2% TID, both eyes  - Latanoprost at bedtime, both eyes  - Diamox Sequels 250 mg BID PO  - Full time glasses wear, monocular precautions  - Ophthalmology consulted. Will be seen in clinic on 10/22 in AM.  - OT: recommending home with 24/7 supervision and HH/OT/PT. If not able to be accomodated by family, they would recommend TCU to maximize safety and independence.    Multifocal subacute CVA  MRI brain obtained inpatient due to abnormal head CT and incidentally noted multiple punctate subacute infarcts. Pt has had some  "intermittent memory issues over past few months per daughter but no other significant changes. No known history of afib. Was not on any medications prior to admission. TTE normal. Tele without Afib. A1c in prediabetic range at 6.0. Cholesterol moderately elevated.   - Neurology consulted  - Start aspirin 81mg  - Start plavix 75mg for 3 weeks followed by aspirin monotherapy  - Start atorvastatin 40mg  - Ziopatch at discharge    Multiple brain aneurysm  Noted incidentally on CTA head and neck that was obtained for stroke work up. Aneurysms of the middle cerebral arteries measuring 7 x 3 mm on the left and 3 mm on the right.  - Will discuss with neurology. Will likely need neurosurgery follow up outpatient.    Hypertensive urgency - improved  - Decrease losartan 12.5 mg p.o. daily  - Continue to closely monitor blood pressure    Intermittent confusion  Ventriculomegaly with effacement of cerebral sulci  Consider normal pressure hydrocephalus  Pts daughter reporting several months of intermittent confusion and memory issues. States they will be having a conversation and she will forget something they just talked about. Will recover to baseline quickly and she feels that she is currently at her baseline. One fall in past few months that was a mechanical fall out of bed. CT imaging in ED with \"Ventriculomegaly with effacement of cerebral sulci. This may represent normal pressure hydrocephalus in the appropriate clinical setting.\"  - Neurology consultation  - Follow up in neuro clinic          Diet: Combination Diet Regular Diet Adult    DVT Prophylaxis: Pneumatic Compression Devices  Ornelas Catheter: Not present  Lines: None     Cardiac Monitoring: ACTIVE order. Indication: Stroke, acute (48 hours)  Code Status: Full Code      Clinically Significant Risk Factors                   # Hypertension: Noted on problem list               # Financial/Environmental Concerns: none         Disposition Plan     Medically Ready for " Discharge: Anticipated in 2-4 Days             Mary Beth Kimble MD  Hospitalist Service, GOLD TEAM 21  [facility]  Securely message with [employer] (more info)  Text page via [employer] Paging/Directory   See signed in provider for up to date coverage information  ______________________________________________________________________    Interval History   No acute events overnight. Feeling well today. No change in vision. No chest pain or palpitations.    Physical Exam   Vital Signs: Temp: 98.6  F (37  C) Temp src: Oral BP: 111/67 Pulse: 69   Resp: 18 SpO2: 100 % O2 Device: None (Room air)    Weight: 150 lbs 1.6 oz    General Appearance: NAD. Lying comfortably in bed.  Respiratory: CTAB. No increased WOB.  Cardiovascular: RRR. No m/r/g  GI: Soft. Non-tender. Non-distended.  Skin: No rash.  Other: AOx4. Moving all extremities. Normal affect.    Medical Decision Making       55 MINUTES SPENT BY ME on the date of service doing chart review, history, exam, documentation & further activities per the note.      Data     I have personally reviewed the following data over the past 24 hrs:    TSH: N/A T4: N/A A1C: 6.0 (H)     INR:  1.02 PTT:  27   D-dimer:  N/A Fibrinogen:  N/A       Imaging results reviewed over the past 24 hrs:   Recent Results (from the past 24 hour(s))   MR Brain w/o & w Contrast    Narrative    EXAM: MR BRAIN W/O & W CONTRAST 10/20/2024 1:04 PM     HISTORY: Ventriculomegaly on CT. MRI recommended by neurology.    COMPARISON: Head CT without contrast 10/18/2024, 1/17/2022.    TECHNIQUE: Sagittal T1-weighted, coronal T2-weighted, axial T2 FLAIR,  axial susceptibility weighted, and axial diffusion-weighted with ADC  map images of the brain were obtained without intravenous contrast.  After the administration of intravenous contrast axial and coronal  fat-suppressed T1-weighted weighted images were obtained. Image  quality degradation by motion artifact despite the  patient's best  efforts.    CONTRAST: 6.8 mL Gadavist.    FINDINGS:  Focus of diffusion restriction in the left centrum semiovale and right  parietal lobe (images 45 and 47) without corresponding decrease in ADC  map which may be due to pseudonormalization.     No mass effect, midline shift, or intracranial hemorrhage. Moderate  dilation of the lateral and third ventricles for example the frontal  horns measure 4.9 cm at the level of the septum pellucidum et cavum  vergae, previously 4.8 cm on CT 10/18/2024 when measuring in small  fashion. Slight effacement of the cerebral sulci at the vertex. Focus  of hemosiderin deposition in the medial right cerebellar hemisphere.  Severe periventricular and subcortical white matter T2 signal  abnormalities. Old basal ganglia infarcts bilaterally and/or prominent  perivascular spaces.    Dural based enhancing mass with scattered foci of susceptibility  artifact from presumed calcium and slight reactive changes in the  adjacent calvarium of the anterior-superior left frontal lobe  measuring 1.8 x 1.5 x 1.1 cm (series 14 image 22, series 15 image 18).      Normal skull marrow signal. Left maxillary sinus mucous retention  cyst.. Trace fluid in the mastoid air cells bilaterally. No focal  abnormality of the midline structures including the pituitary gland,  sella, skull base, and upper cervical spinal structures on sagittal  images. Globes are normal.      Impression    IMPRESSION:  1. Extensive confluent white matter signal abnormality in both  cerebral hemispheres, probably severe chronic small vessel ischemic  disease. Foci of restricted diffusion possibly representing subacute  infarcts in the right parietal lobe, corpus callosum, and left frontal  lobe.  2. Stable moderate ventriculomegaly, perhaps normal pressure  hydrocephalus.  3. Small presumed meningioma over the left frontal lobe measuring up  to 1.8 cm.    Findings discussed with Dr. Kimble by Dr. Anthony Miranda at  10/20/2024  1:23 PM via telephone and understanding was verbalized.    I have personally reviewed the examination and initial interpretation  and I agree with the findings.    GIORGI RODRIGUEZ MD         SYSTEM ID:  [id number]   CTA Head Neck with Contrast   Result Value    Radiologist flags Aneurysm    Narrative    EXAM: Head/neck CTA with contrast 10/20/2024 7:49 PM     HISTORY: subacute CVA on MRI.    COMPARISON: Brain MRI with contrast performed earlier today.    TECHNIQUE:  HEAD and NECK CTA: During rapid bolus intravenous injection of  nonionic contrast material, axial images were obtained using thin  collimation multidetector helical technique from the base of the upper  aortic arch through the [place] of [place]. This CT angiogram data was  reconstructed at thin intervals with mild overlap. Images were sent to  the 3D workstation, and 3D reconstructions were obtained. The axial  source images, multiplanar reformations, 3D reconstructions in both  maximum intensity projection display and volume rendered models were  reviewed, with reconstructions performed by the technologist.    CONTRAST: 67 mL Isovue 370.     FINDINGS:  Head CTA demonstrates no large vessel arterial occlusion or stenosis  of the major intracranial arteries. Slight luminal irregularity of the  M2 middle cerebral arteries. 7 x 3 mm on the left and 3 mm on the  right aneurysmal outpouchings of the distal M2 branches of the right  and left middle cerebral arteries (series 5 image 211).    Neck CTA demonstrates patent great vessel origins. The normal distal  right internal carotid artery measures 5 mm. The normal distal left  internal carotid artery measures 5 mm. No vertebral artery stenosis.    No acute finding in the visualized intracranial, orbital, skull base  and soft tissue neck structures. Clear mastoid air cells and paranasal  sinuses..      Impression    IMPRESSION:    1. Head CTA demonstrates no stenosis or occlusion of the  major  intracranial arteries. Aneurysms of the middle cerebral arteries  measuring 7 x 3 mm on the left and 3 mm on the right.    2. Neck CTA demonstrates no stenosis of the major cervical arteries.    [Consider Follow Up: Aneurysm]    This report will be copied to the [facility] to ensure a  provider acknowledges the finding. Access Center is available Monday  through Friday 8am-3:30 pm.    I have personally reviewed the examination and initial interpretation  and I agree with the findings.    GIORGI RODRIGUEZ MD         SYSTEM ID:  [id number]   Echo Complete   Result Value    LVEF  60-65%    Narrative    [id number]  [id number]  [id number]  [id number]^ALYSSA^AVILA^PREET     [facility],[place]  Echocardiography Laboratory  [address] [id number]     Name: EUFEMIA HERNANDEZ  MRN: [id number]  : 1950  Study Date: 10/21/2024 09:02 AM  Age: 74 yrs  Gender: Female  Patient Location: [facility]  Reason For Study: CVA  Ordering Physician: AVILA SHAH  Performed By: Nela Serrano     BSA: 1.8 m2  Height: 67 in  Weight: 150 lb  HR: 70  BP: 124/103 mmHg  ______________________________________________________________________________  Procedure  Complete Portable Echo Adult. Contrast Optison. Echocardiogram with two-  dimensional, color and spectral Doppler performed. Optison (NDC #3865-5766-59)  given intravenously. Patient was given 6 ml mixture of 3 ml Optison and 6 ml  saline. 3 ml wasted.  ______________________________________________________________________________  Interpretation Summary  Global and regional left ventricular function is normal with an EF of 60-65%.  Mild concentric wall thickening consistent with left ventricular hypertrophy  is present.  Both atria appear normal.  No significant valvular abnormalities present.  Pulmonary artery systolic pressure cannot be  assessed.  ______________________________________________________________________________  Left Ventricle  Global and regional left ventricular function is normal with an EF of 60-65%.  Mild concentric wall thickening consistent with left ventricular hypertrophy  is present. Left ventricular diastolic function is indeterminate.     Right Ventricle  Right ventricular function, chamber size, wall motion, and thickness are  normal.     Atria  Both atria appear normal.     Mitral Valve  The mitral valve is normal.     Aortic Valve  Aortic valve is normal in structure and function. The aortic valve is  tricuspid. Aortic valve sclerosis is present.     Tricuspid Valve  The tricuspid valve is normal. Trace tricuspid insufficiency is present.  Pulmonary artery systolic pressure cannot be assessed.     Pulmonic Valve  The pulmonic valve is normal.     Vessels  The aorta root is normal. The thoracic aorta is normal. The pulmonary artery  cannot be assessed. The inferior vena cava was normal in size with preserved  respiratory variability.     Pericardium  No pericardial effusion is present.     Miscellaneous  No significant valvular abnormalities present.     Compared to Previous Study  Previous study not available for comparison.  ______________________________________________________________________________  MMode/2D Measurements & Calculations  IVSd: 1.3 cm  LVIDd: 3.5 cm  LVIDs: 2.1 cm  LVPWd: 1.2 cm  FS: 39.3 %  LV mass(C)d: 137.0 grams  LV mass(C)dI: 76.6 grams/m2  Ao root diam: 2.4 cm  asc Aorta Diam: 3.5 cm  LVOT diam: 2.0 cm  LVOT area: 3.1 cm2  Ao root diam index Ht(cm/m): 1.4  Ao root diam index BSA (cm/m2): 1.3  Asc Ao diam index BSA (cm/m2): 2.0  Asc Ao diam index Ht(cm/m): 2.1  RWT: 0.68     Doppler Measurements & Calculations  MV E max keyon: 41.1 cm/sec  MV A max keyon: 77.6 cm/sec  MV E/A: 0.53  MV dec slope: 229.0 cm/sec2  MV dec time: 0.18 sec  PA acc time: 0.06 sec  TR max keyon: 221.0 cm/sec  TR max P.5  mmHg  E/E' av.3  Lateral E/e': 6.4  Medial E/e': 10.1     ______________________________________________________________________________  Report approved by: Hi Kaye 10/21/2024 11:51 AM

## 2024-10-21 NOTE — PROGRESS NOTES
10/21/24  Care Management Follow Up    ACCEPTING:    [facility]  [address] [place] [address]  [phone]  10/20: initial referral made  10/21: CHW called admissions and requested a call back     Pending Referrals:     [facility]  [address] [place] [address]  [phone]  10/20: initial referral made  10/21: CHW called and spoke with admissions and they will call back after they review      [facility]  [address] [place] [address]  [phone]  10/20: initial referral made  10/21: CHW called and spoke with admissions and they will call back after they review      [facility]  [address] [place] [address]  [phone]  10/20: initial referral made  10/21: CHW called admissions and requested a call back       Declined:    [facility]  [address] [place] [address]  [phone]  10/20: initial referral made  10/21: Not an appropriate facility for pt    [facility]  [address] [place] [address]  [phone]  10/20: initial referral made  10/21: Declined due to no bed available     Lisa Barbosa  Inpatient CHW  [facility] 5 Ortho, 8 Med Surge, & ED  PH: [phone]

## 2024-10-21 NOTE — PROGRESS NOTES
[facility],  Neurology Daily Note          Assessment and Plan:   Patient is a 75 yo female who is being hospitalized for right eye redness, thought to most likely be related to acute angle closure glaucoma per notes. Neurology was initially consulted for abnormal CT head scan and possible NPH. Patient will follow-up outpatient for consideration of more work-up for NPH.     MRI brain was completed, which showed several asymptomatic incident multifocal strokes. Embolic etiology of stroke is a consideration. She has had work-up as below. I would also recommend 2 week zio patch at discharge.     Incidental aneurysms were seen on CTA head. I would recommend outpatient neurosurgery follow-up for this.     Recommendations  - Continue aspirin 81 mg and Plavix 75 mg daily for 21 days followed by aspirin 81 mg daily monotherapy  - 2 week zio patch at discharge  - Continue Lipitor 40 mg daily; LDL goal < 70  - BP goal < 140/90 and < 130/80 if tolerated  - Outpatient neurosurgery consult for aneurysm  - Outpatient general neurology follow-up (will arrange)    Please call with questions     Venkat Jason MD   of Neurology  [facility]        Interval History:   MRI brain showed multifocal subacute strokes. Patient denies any new concerns today.         Review of Systems:   As above          Medications:     Current Facility-Administered Medications   Medication Dose Route Frequency Provider Last Rate Last Admin    acetaZOLAMIDE (DIAMOX) tablet 250 mg  250 mg Oral BID Khai Mcintosh MD   250 mg at 10/21/24 1547    aspirin (ASA) chewable tablet 81 mg  81 mg Oral Daily Mary Beth Kimble MD   81 mg at 10/21/24 0845    atorvastatin (LIPITOR) tablet 40 mg  40 mg Oral QPM Mary Beth Kimble MD   40 mg at 10/20/24 1953    brimonidine (ALPHAGAN) 0.2 % ophthalmic solution 1 drop  1 drop Both Eyes TID Khai Mcintosh MD   1 drop at 10/21/24 1721    calcium  carbonate (TUMS) chewable tablet 1,000 mg  1,000 mg Oral 4x Daily PRN Conner Adamson DO        clopidogrel (PLAVIX) tablet 75 mg  75 mg Oral Daily Mary Beth Kimble MD   75 mg at 10/21/24 0845    dorzolamide-timolol (COSOPT) ophthalmic solution 1 drop  1 drop Both Eyes BID Khai Mcintosh MD   1 drop at 10/21/24 0842    hydrALAZINE (APRESOLINE) tablet 10 mg  10 mg Oral Q4H PRN Conner Adamson DO        Or    hydrALAZINE (APRESOLINE) injection 10 mg  10 mg Intravenous Q4H PRN Conner Adamson DO        latanoprost (XALATAN) 0.005 % ophthalmic solution 1 drop  1 drop Both Eyes At Bedtime Khai Mcintosh MD   1 drop at 10/19/24 2151    lidocaine (LMX4) cream   Topical Q1H PRN Conner Adamson DO        lidocaine 1 % 0.1-1 mL  0.1-1 mL Other Q1H PRN Conner Adamson DO        losartan (COZAAR) half-tab 12.5 mg  12.5 mg Oral Daily Mary Beth Kimble MD   12.5 mg at 10/21/24 0846    ondansetron (ZOFRAN ODT) ODT tab 4 mg  4 mg Oral Q6H PRN Conner Adamson DO        Or    ondansetron (ZOFRAN) injection 4 mg  4 mg Intravenous Q6H PRN Conner Adamson DO        senna-docusate (SENOKOT-S/PERICOLACE) 8.6-50 MG per tablet 1 tablet  1 tablet Oral BID PRN Conner Adamson DO        Or    senna-docusate (SENOKOT-S/PERICOLACE) 8.6-50 MG per tablet 2 tablet  2 tablet Oral BID PRN Conner Adamson DO        sodium chloride (PF) 0.9% PF flush 3 mL  3 mL Intracatheter Q8H Conner Adamson DO   3 mL at 10/21/24 1547    sodium chloride (PF) 0.9% PF flush 3 mL  3 mL Intracatheter q1 min prn Conner Adamson DO                 Neurology Focused Physical Exam:   General: Seated comfortably in no acute distress.  Lungs: breathing comfortably  Neurologic:     Mental Status: Fully alert, attentive. Oriented to person and place. When asked the month said it was October or November. Language normal, speech clear and fluent, no paraphasic errors.     Cranial Nerves: Visual fields intact. EOMI with normal smooth pursuit. Facial sensation intact/symmetric. Facial movements symmetric.  Hearing not formally tested but intact to conversation. Palate elevation symmetric, uvula midline. No dysarthria. Shoulder shrug strong bilaterally. Tongue protrusion midline.     Motor: No tremors or other abnormal movements observed. Strength 5/5 throughout upper and lower extremities.     Sensory: Intact/symmetric to light touch throughout upper and lower extremities.      Coordination: Finger-nose-finger and heel-shin intact without dysmetria.           Data:   CT head 10/2024  IMPRESSION:   1. No acute intracranial pathology.   2. Advanced chronic small vessel ischemic disease and cerebral  atrophy.  3. Ventriculomegaly with effacement of cerebral sulci. This may  represent normal pressure hydrocephalus in the appropriate clinical  setting.    , A1c 6.0, normal PTT/INR    MRI brain  IMPRESSION:  1. Extensive confluent white matter signal abnormality in both  cerebral hemispheres, probably severe chronic small vessel ischemic  disease. Foci of restricted diffusion possibly representing subacute  infarcts in the right parietal lobe, corpus callosum, and left frontal  lobe.  2. Stable moderate ventriculomegaly, perhaps normal pressure  hydrocephalus.  3. Small presumed meningioma over the left frontal lobe measuring up  to 1.8 cm.  Personally reviewed and agree with above impression    CTA head/neck  IMPRESSION:    1. Head CTA demonstrates no stenosis or occlusion of the major  intracranial arteries. Aneurysms of the middle cerebral arteries  measuring 7 x 3 mm on the left and 3 mm on the right.  2. Neck CTA demonstrates no stenosis of the major cervical arteries.    TTE  Interpretation Summary  Global and regional left ventricular function is normal with an EF of 60-65%.  Mild concentric wall thickening consistent with left ventricular hypertrophy  is present.  Both atria appear normal.  No significant valvular abnormalities present.  Pulmonary artery systolic pressure cannot be assessed.

## 2024-10-21 NOTE — PLAN OF CARE
"Goal Outcome Evaluation:  Blood pressure 107/76, pulse 79, temperature 98.3  F (36.8  C), temperature source Oral, resp. rate 17, height 1.702 m (5' 7\"), weight 68.1 kg (150 lb 1.6 oz), SpO2 99%.  Pt A&OX3 disoriented to time. Intermittent confusion. Pt is on RA No SOB noted. Pt can ambulate and transfer with GB and walker with one assist. Bowel and bladder continent. All scheduled medications were administered. Pt on cont' cardiac monitor. Co RUQ pain provider notified. PRN VIRIDIANA and oxycodone. Call light within pt's reach. Family came in to visit and participate in care.    Problem: Adult Inpatient Plan of Care  Goal: Plan of Care Review  Description: The Plan of Care Review/Shift note should be completed every shift.  The Outcome Evaluation is a brief statement about your assessment that the patient is improving, declining, or no change.  This information will be displayed automatically on your shift  note.  Outcome: Progressing  Goal: Patient-Specific Goal (Individualized)  Description: You can add care plan individualizations to a care plan. Examples of Individualization might be:  \"Parent requests to be called daily at 9am for status\", \"I have a hard time hearing out of my right ear\", or \"Do not touch me to wake me up as it startles  me\".  Outcome: Progressing  Goal: Absence of Hospital-Acquired Illness or Injury  Outcome: Progressing  Goal: Optimal Comfort and Wellbeing  Outcome: Progressing  Goal: Readiness for Transition of Care  Outcome: Progressing     Problem: Risk for Delirium  Goal: Optimal Coping  Outcome: Progressing  Goal: Improved Behavioral Control  Outcome: Progressing  Goal: Improved Attention and Thought Clarity  Outcome: Progressing  Goal: Improved Sleep  Outcome: Progressing                         "

## 2024-10-21 NOTE — TELEPHONE ENCOUNTER
----- Message from Marni DONAHUE sent at 10/21/2024  6:35 AM CDT -----  Regarding: RE: New patient visit  N for hospital follow up with Dr Jason.  Please offer 12/17 at 1:00 at .  The spot is held.    Thank you!  ----- Message -----  From: Venkat Jason MD  Sent: 10/19/2024   4:43 PM CDT  To: General Neurology Nurses - ; #  Subject: New patient visit                                Hi Team,     I saw this patient in the hospital this weekend. Can we see if there are any available spots in the next 1-2 months at the [facility] or [facility] for a new patient visit with me?     Thanks!  Venkat

## 2024-10-21 NOTE — TELEPHONE ENCOUNTER
Per on-call provider note: repeat evaluation and possible Laser peripheral iridotomy     Discussed with Dr. Jay and OK to schedule in acute clinic tomorrow 10/22/24.     Returned call to [address] unit.     Notified ARA Gay of appointment. Deidra will set up transportation for appointment tomorrow.     All questions were answered.     Geovanna Lin RN 10:35 AM 10/21/24

## 2024-10-21 NOTE — PLAN OF CARE
Goal Outcome Evaluation:      Plan of Care Reviewed With: patient    Overall Patient Progress: no changeOverall Patient Progress: no change         Outcome Evaluation:   A&Ox4. CMS intact. Denies numbness/tingling, nausea,vomiting, SOB, and chest pain.Up A1 w/SBA walker. voiding adequate amounts. Incontinent of bladder.  Denies pain. Patient rested comfortably throughout night.  Disposition: Awaiting TCU placement  Continue with POC.

## 2024-10-22 ENCOUNTER — APPOINTMENT (OUTPATIENT)
Dept: OCCUPATIONAL THERAPY | Facility: CLINIC | Age: 74
DRG: 124 | End: 2024-10-22
Attending: OPHTHALMOLOGY
Payer: COMMERCIAL

## 2024-10-22 ENCOUNTER — OFFICE VISIT (OUTPATIENT)
Dept: OPHTHALMOLOGY | Facility: CLINIC | Age: 74
DRG: 124 | End: 2024-10-22
Attending: OPHTHALMOLOGY
Payer: COMMERCIAL

## 2024-10-22 DIAGNOSIS — H40.033 ANATOMICAL NARROW ANGLE BORDERLINE GLAUCOMA OF BOTH EYES: ICD-10-CM

## 2024-10-22 DIAGNOSIS — H40.241 RESIDUAL STAGE OF ANGLE-CLOSURE GLAUCOMA OF RIGHT EYE: Primary | ICD-10-CM

## 2024-10-22 DIAGNOSIS — H25.812 COMBINED FORM OF AGE-RELATED CATARACT, LEFT EYE: ICD-10-CM

## 2024-10-22 PROCEDURE — 92133 CPTRZD OPH DX IMG PST SGM ON: CPT | Mod: 26 | Performed by: OPHTHALMOLOGY

## 2024-10-22 PROCEDURE — 99214 OFFICE O/P EST MOD 30 MIN: CPT | Mod: GC | Performed by: OPHTHALMOLOGY

## 2024-10-22 PROCEDURE — 250N000013 HC RX MED GY IP 250 OP 250 PS 637

## 2024-10-22 PROCEDURE — 97535 SELF CARE MNGMENT TRAINING: CPT | Mod: GO

## 2024-10-22 PROCEDURE — 76513 OPH US DX ANT SGM US UNI/BI: CPT

## 2024-10-22 PROCEDURE — G0463 HOSPITAL OUTPT CLINIC VISIT: HCPCS

## 2024-10-22 PROCEDURE — 99232 SBSQ HOSP IP/OBS MODERATE 35: CPT | Performed by: STUDENT IN AN ORGANIZED HEALTH CARE EDUCATION/TRAINING PROGRAM

## 2024-10-22 PROCEDURE — 92133 CPTRZD OPH DX IMG PST SGM ON: CPT

## 2024-10-22 PROCEDURE — 120N000002 HC R&B MED SURG/OB UMMC

## 2024-10-22 PROCEDURE — 250N000013 HC RX MED GY IP 250 OP 250 PS 637: Performed by: STUDENT IN AN ORGANIZED HEALTH CARE EDUCATION/TRAINING PROGRAM

## 2024-10-22 PROCEDURE — 76513 OPH US DX ANT SGM US UNI/BI: CPT | Mod: 26 | Performed by: OPHTHALMOLOGY

## 2024-10-22 RX ORDER — FLUOROMETHOLONE 1 MG/ML
1 SUSPENSION/ DROPS OPHTHALMIC 2 TIMES DAILY
Status: DISCONTINUED | OUTPATIENT
Start: 2024-10-22 | End: 2024-10-24 | Stop reason: HOSPADM

## 2024-10-22 RX ORDER — FLUOROMETHOLONE 1 MG/ML
1 SUSPENSION/ DROPS OPHTHALMIC 2 TIMES DAILY
Status: DISCONTINUED | OUTPATIENT
Start: 2024-10-22 | End: 2024-10-22

## 2024-10-22 RX ADMIN — BRIMONIDINE TARTRATE 1 DROP: 2 SOLUTION/ DROPS OPHTHALMIC at 07:53

## 2024-10-22 RX ADMIN — BRIMONIDINE TARTRATE 1 DROP: 2 SOLUTION/ DROPS OPHTHALMIC at 19:50

## 2024-10-22 RX ADMIN — BRIMONIDINE TARTRATE 1 DROP: 2 SOLUTION/ DROPS OPHTHALMIC at 13:13

## 2024-10-22 RX ADMIN — DORZOLAMIDE HYDROCHLORIDE AND TIMOLOL MALEATE 1 DROP: 20; 5 SOLUTION OPHTHALMIC at 19:49

## 2024-10-22 RX ADMIN — ASPIRIN 81 MG CHEWABLE TABLET 81 MG: 81 TABLET CHEWABLE at 07:54

## 2024-10-22 RX ADMIN — LATANOPROST 1 DROP: 50 SOLUTION OPHTHALMIC at 21:43

## 2024-10-22 RX ADMIN — FLUOROMETHOLONE 1 DROP: 1 SOLUTION/ DROPS OPHTHALMIC at 19:48

## 2024-10-22 RX ADMIN — ACETAZOLAMIDE 250 MG: 250 TABLET ORAL at 07:54

## 2024-10-22 RX ADMIN — CLOPIDOGREL BISULFATE 75 MG: 75 TABLET ORAL at 07:54

## 2024-10-22 RX ADMIN — Medication 12.5 MG: at 07:54

## 2024-10-22 RX ADMIN — ATORVASTATIN CALCIUM 40 MG: 40 TABLET, FILM COATED ORAL at 19:45

## 2024-10-22 RX ADMIN — ACETAZOLAMIDE 250 MG: 250 TABLET ORAL at 16:33

## 2024-10-22 RX ADMIN — DORZOLAMIDE HYDROCHLORIDE AND TIMOLOL MALEATE 1 DROP: 20; 5 SOLUTION OPHTHALMIC at 07:56

## 2024-10-22 ASSESSMENT — ACTIVITIES OF DAILY LIVING (ADL)
ADLS_ACUITY_SCORE: 34
ADLS_ACUITY_SCORE: 34
ADLS_ACUITY_SCORE: 36
ADLS_ACUITY_SCORE: 34
ADLS_ACUITY_SCORE: 36
ADLS_ACUITY_SCORE: 34
ADLS_ACUITY_SCORE: 36
ADLS_ACUITY_SCORE: 36
ADLS_ACUITY_SCORE: 34
ADLS_ACUITY_SCORE: 34
ADLS_ACUITY_SCORE: 36
ADLS_ACUITY_SCORE: 34
ADLS_ACUITY_SCORE: 36
ADLS_ACUITY_SCORE: 35
ADLS_ACUITY_SCORE: 34
ADLS_ACUITY_SCORE: 34
ADLS_ACUITY_SCORE: 36
ADLS_ACUITY_SCORE: 34
ADLS_ACUITY_SCORE: 36

## 2024-10-22 ASSESSMENT — VISUAL ACUITY
METHOD: SNELLEN - LINEAR
OS_CC: 20/60
OD_SC: NLP
OS_CC+: -1

## 2024-10-22 ASSESSMENT — REFRACTION_WEARINGRX
OS_SPHERE: +0.75
OS_AXIS: 155
OS_CYLINDER: +1.00
OD_CYLINDER: +0.75
OD_SPHERE: +1.50
OD_AXIS: 145

## 2024-10-22 ASSESSMENT — CONF VISUAL FIELD
OS_INFERIOR_NASAL_RESTRICTION: 3
OD_INFERIOR_TEMPORAL_RESTRICTION: 1
OS_SUPERIOR_TEMPORAL_RESTRICTION: 3
OD_INFERIOR_NASAL_RESTRICTION: 1
OD_SUPERIOR_NASAL_RESTRICTION: 1
OS_INFERIOR_TEMPORAL_RESTRICTION: 3
OD_SUPERIOR_TEMPORAL_RESTRICTION: 1
OS_SUPERIOR_NASAL_RESTRICTION: 3

## 2024-10-22 ASSESSMENT — SLIT LAMP EXAM - LIDS
COMMENTS: 2+ DERMATOCHALASIS
COMMENTS: 2+ DERMATOCHALASIS

## 2024-10-22 ASSESSMENT — TONOMETRY
OD_IOP_MMHG: 29
OS_IOP_MMHG: 14
IOP_METHOD: TONOPEN

## 2024-10-22 ASSESSMENT — CUP TO DISC RATIO: OD_RATIO: 0.4

## 2024-10-22 ASSESSMENT — EXTERNAL EXAM - LEFT EYE: OS_EXAM: NORMAL

## 2024-10-22 ASSESSMENT — EXTERNAL EXAM - RIGHT EYE: OD_EXAM: PROTECTIVE PTOSIS

## 2024-10-22 NOTE — PROGRESS NOTES
Shift [id number]:Admitted with right eye redness  Pt has had a MRI of the brain which showed several asymptomatic multifocal strokes  Summary:Awaiting TCU placement  VS:       Pt A/O X 3 Disoriented to time Intermittent confusion Afebrile. VSS. Lungs- Clear bilaterally with both Denies nausea, shortness of breath, and chest pain.     Output:       Bowels- + in all four quadrants. Voids spontaneously without   difficulty in the toilet Uses a PRN brief.      Activity:       Pt up with walker and GB with 1 assist .     Skin:   Skin intact .     Pain:       Has pain in the RUQ in the prior shift and was given Oxycodone Denies any pain during the shift .      CMS:       CMS and Neuro's are intact. Denies numbness and tingling in all extremities.      Dressing:     No incisional dressing.      Diet:       Pt is on a Regular diet       LDA:       PIV is patent in the Left Forearm .      Equipment:       No  PCD's on BLE's. Bilateral heels are elevated off the bed.     Plan:       Pt is able to make needs known and the call light is within the pt's reach. Continue to monitor.       Additional Info:        .

## 2024-10-22 NOTE — PROGRESS NOTES
10/22/24  Care Management Follow Up    ACCEPTING:     [facility]  [address] [place] [address]  [phone]  10/20: initial referral made  10/21: CHW called admissions and requested a call back    [facility]  [address] [place] [address]  [phone]  10/20: initial referral made  10/21: CHW called and spoke with admissions and they will call back after they review      Pending Referrals:     [facility]  [address] [place] [address]  [phone]  10/20: initial referral made  10/21: CHW called and spoke with admissions and they will call back after they review      Declined:     [facility]  [address] [place] [address]  [phone]  10/20: initial referral made  10/21: Not an appropriate facility for [facility]  [address] [place] [address]  [phone]  10/20: initial referral made  10/21: Declined due to no bed available     [facility]  [address] [place], [place] [address] [phone]  10/20: initial referral made  10/21: CHW called admissions and requested a call back  10/22: Declined due to not eligible and maybe be better suited for HC. [facility] likely would not auth    Lisa Barbosa  Inpatient CHW  [facility] 5 Ortho, 8 Med Surge, & ED  PH: [phone]

## 2024-10-22 NOTE — PLAN OF CARE
0700 -1500  Goal Outcome Evaluation:      Plan of Care Reviewed With: patient    Overall Patient Progress: improvingOverall Patient Progress: improving       Problem: Adult Inpatient Plan of Care  Goal: Plan of Care Review  Description: The Plan of Care Review/Shift note should be completed every shift.  The Outcome Evaluation is a brief statement about your assessment that the patient is improving, declining, or no change.  This information will be displayed automatically on your shift  note.  Outcome: Progressing  Flowsheets (Taken 10/22/2024 0213)  Plan of Care Reviewed With: patient  Overall Patient Progress: improving     Pt A&OX4. Pt is on RA No SOB noted. Pt can ambulate and transfer with GB and walker with one assist. Bowel and bladder continent. All scheduled medications were administered. Pt cardiac monitor discontinued. Denies pain Call light within pt's reach. Pt went to eye clinic this morning and returned around 2pm.

## 2024-10-22 NOTE — PROGRESS NOTES
Ophthalmology Acute Clinic   Chief Complaint   Patient presents with    Follow Up     Ocular hypertension concern for narrow angles            HPI:   Rolanda Molina is a 74 year old female who presents for evaluation of elevated intraocular pressures of the right eye.    HPI from 10/18/24 ophthalmology consult note (Dr. Mcintosh):    She was seen in the ED on 10/7 and diagnosed with an acute bacterial conjunctivitis of the right eye. Intraocular pressure is not recorded from that visit. She used topical antibiotic eyedrops for the past 10 days without significant relief of right eye redness. Within the past few days she began to notice vision in the right eye was declining, but she did not immediately seek care. Notable, she states she had some headaches during this period but she did not have severe eye pain, nausea, or vomiting.     She does not have a personal history of glaucoma. She does not take any medications with anticholinergic properties or suspects for precipitating angle closure. She states that she had a mother and sister with glaucoma but neither of them went blind.       10/22/24: Patient reports stable vision in both eyes (NLP right eye). Denies any eye pain, discomfort, or changes in vision in the left eye       Past Ocular History:  Last eye exam: 2023  Prior eye surgery/laser: None  Contact lens wear: None  Glasses: None  Eyedrops: None    PMH:     Past Medical History:   Diagnosis Date    Hypertension        FH: Mother and sister had glaucoma     Review of systems for the eyes was negative other than the pertinent positives/negatives listed in the HPI.      Imaging (10/22/24):   OCT rNFL:     Right eye: G score of 160, moderate edema in all quadrants     Left eye:    G score of  115, no appreciable thinning or edema in any quadrants     UBM:     Right eye: thick iris with angle closure anatomy in multiple angles.      Left eye:  thick iris and intermittent angle closure anatomy.    Assessment  "& Plan      Rolanda Molina is a 74 year old female with the following diagnoses:   1. 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 This patient only presented complaining of blurriness with no light perception vision found on objective testing  - Not in a state of significant pain at this time in spite of elevated pressures, complains only of \"scratchiness\"  - Given multiple rounds of maximum topical therapy and Diamox 500 mg IV, without significant response in intraocular pressure. IOP remains above reportable range on Tonopen  - Head CT without retro-orbital hemorrhage. B-scan with no vitreous debris  - Assessment: This is a mixed picture - acute pressure elevation to this degree would normally be exquisitely painful and her corneal edema suggests acuity. Therefore, there is likely an acute on chronic component to this presentation given the severity of vision loss and the discrepancy between the subjective complaint: \"blurred vision with minimal discomfort;\" and objective findings: NLP vision, corneal edema, and elevated IOP beyond readable range by Tonopen.  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 Considered mannitol IV versus procedural/surgical intervention to lower the pressure, but there does not seem to be a likelihood of visual recovery in this case given delayed presentation with symptom onset over one week ago - risk therefore would outweigh benefit.     10/22/24: Va in the right eye remains NLP,  20/60 left eye (without glasses). IOP in clinic today was 29/14. Patient unable to transfer to examination chair for full slit lamp exam. Via portable slit lamp,  right eye remains with 2+ injection, significant improvement of corneal edema was noted. Right " pupil remains mid dilated and minimally reactive, a good view of the optic nerve in the right eye was achieved using the indirect ophthalmoscope (c/d of 0.4, healthy appearing neuro-retinal rim). OCT rNFL concerning for right optic nerve concerning for optic nerve edema.     Given lack of pain, new found optic nerve edema, and truly undetectable IOP's via tonopen on presentation suspect markedly elevated intraocular pressure lead to ischemic event damaging the optic nerve. Very limited visual prognosis in the right eye. Left eye with normal IOP and healthy appearing optic nerve on OCT rNFL as well as on clinical exam.  Unable to obtain anterior segment OCT today due to patient positioning.  Ultrasound biomicroscopy of the right eye demonstrated a thick iris with angle closure anatomy in multiple angles. Left eye with a similarly thick iris and intermittent angle closure anatomy. Likely suspect subacute angle closure is secondary to anatomically narrow angles coupled with dense cataract. Patient with thick iris and  dense arcus in both eyes which limits ability to perform a laser peripheral iridotomy (coupled with inability of patient to get in proper positioning).       PLAN:  -Recommend Ce/IOL left eye, patient agreeable.   -Will plan to schedule for cataract surgery left eye (next available); schedule for Immersion/IOL calcs 1 week prior to surgery date  - START FML BID, right eye   - STOP Diamox  - Continue Cosopt BID, both eyes  - Continue Brimonidine 0.2% TID, both eyes  - Latanoprost at bedtime, both eyes  - Agree with continued Neurology work up for NPH  - Full time glasses wear, monocular precautions    Patient disposition:   Return in about 4 weeks (around 11/19/2024) for VT only, Immersion, auto-Ks.    Patient seen with Dr. Grey    Thank you for entrusting us with your care  Yessi Jay MD  Resident Physician, PGY-2  Department of Ophthalmology  10/22/24 8:25 AM     Attending Physician Attestation:   Complete documentation of historical and exam elements from today's encounter can be found in the full encounter summary report (not reduplicated in this progress note).  I personally obtained the chief complaint(s) and history of present illness.  I confirmed and edited as necessary the review of systems, past medical/surgical history, family history, social history, and examination findings as documented by others; and I examined the patient myself.  I personally reviewed the relevant tests, images, and reports as documented above.  I formulated and edited as necessary the assessment and plan and discussed the findings and management plan with the patient and family. Attending Physician Image/Tesing Attestation: I personally reviewed the ophthalmic test(s) associated with this encounter, agree with the interpretation(s) as documented by the resident/fellow, and have edited the corresponding report(s) as necessary.  Today with Rolanda Molina , I reviewed the indications, risks, benefits, and alternatives of the proposed surgical procedure including, but not limited to, failure obtain the desired result  and need for additional surgery, bleeding, infection, loss of vision, loss of the eye, and the remote possibility of permanent damage to any organ system or death with the use of anesthesia.  I provided multiple opportunities for the questions, answered all questions to the best of my ability, and confirmed that my answers and my discussion were understood.    . - Ashu Grey MD

## 2024-10-22 NOTE — NURSING NOTE
Chief Complaints and History of Present Illnesses   Patient presents with    Follow Up     Ocular hypertension concern for narrow angles     Chief Complaint(s) and History of Present Illness(es)       Follow Up              Laterality: both eyes    Onset: 4 weeks ago    Associated symptoms: eye pain (intermittent in right eye), redness, tearing and discharge.  Negative for itching    Treatments tried: eye drops    Pain scale: 0/10    Comments: Ocular hypertension concern for narrow angles              Comments    She tells me that for the past months she has been unable to see out of her right eye.  She tells me that she gets intermittent steady pain in her right eye.  The eye also tears and matters.    Juli Diana, COT 8:33 AM  October 22, 2024

## 2024-10-23 PROCEDURE — 99232 SBSQ HOSP IP/OBS MODERATE 35: CPT | Performed by: STUDENT IN AN ORGANIZED HEALTH CARE EDUCATION/TRAINING PROGRAM

## 2024-10-23 PROCEDURE — 250N000013 HC RX MED GY IP 250 OP 250 PS 637: Performed by: STUDENT IN AN ORGANIZED HEALTH CARE EDUCATION/TRAINING PROGRAM

## 2024-10-23 PROCEDURE — 120N000002 HC R&B MED SURG/OB UMMC

## 2024-10-23 PROCEDURE — 250N000013 HC RX MED GY IP 250 OP 250 PS 637

## 2024-10-23 RX ADMIN — FLUOROMETHOLONE 1 DROP: 1 SOLUTION/ DROPS OPHTHALMIC at 19:50

## 2024-10-23 RX ADMIN — CLOPIDOGREL BISULFATE 75 MG: 75 TABLET ORAL at 11:33

## 2024-10-23 RX ADMIN — FLUOROMETHOLONE 1 DROP: 1 SOLUTION/ DROPS OPHTHALMIC at 11:35

## 2024-10-23 RX ADMIN — BRIMONIDINE TARTRATE 1 DROP: 2 SOLUTION/ DROPS OPHTHALMIC at 19:50

## 2024-10-23 RX ADMIN — BRIMONIDINE TARTRATE 1 DROP: 2 SOLUTION/ DROPS OPHTHALMIC at 14:35

## 2024-10-23 RX ADMIN — Medication 12.5 MG: at 11:33

## 2024-10-23 RX ADMIN — ATORVASTATIN CALCIUM 40 MG: 40 TABLET, FILM COATED ORAL at 19:50

## 2024-10-23 RX ADMIN — ASPIRIN 81 MG CHEWABLE TABLET 81 MG: 81 TABLET CHEWABLE at 11:33

## 2024-10-23 RX ADMIN — BRIMONIDINE TARTRATE 1 DROP: 2 SOLUTION/ DROPS OPHTHALMIC at 11:39

## 2024-10-23 RX ADMIN — DORZOLAMIDE HYDROCHLORIDE AND TIMOLOL MALEATE 1 DROP: 20; 5 SOLUTION OPHTHALMIC at 19:51

## 2024-10-23 RX ADMIN — LATANOPROST 1 DROP: 50 SOLUTION OPHTHALMIC at 22:25

## 2024-10-23 RX ADMIN — DORZOLAMIDE HYDROCHLORIDE AND TIMOLOL MALEATE 1 DROP: 20; 5 SOLUTION OPHTHALMIC at 11:37

## 2024-10-23 ASSESSMENT — ACTIVITIES OF DAILY LIVING (ADL)
ADLS_ACUITY_SCORE: 34
ADLS_ACUITY_SCORE: 0

## 2024-10-23 NOTE — PROGRESS NOTES
Ophthalmology Acute Clinic   Chief Complaint   Patient presents with    Eye Problem     Right eye redness, drainage for over a week. Pt seen last week and using drops that do not seem to help. Pt C/O tooth ache pain (does not wear her dentures)            HPI:   Rolanda Molina is a 74 year old female who presents for evaluation of elevated intraocular pressures of the right eye.    HPI from 10/18/24 ophthalmology consult note (Dr. Mcintosh):    She was seen in the ED on 10/7 and diagnosed with an acute bacterial conjunctivitis of the right eye. Intraocular pressure is not recorded from that visit. She used topical antibiotic eyedrops for the past 10 days without significant relief of right eye redness. Within the past few days she began to notice vision in the right eye was declining, but she did not immediately seek care. Notable, she states she had some headaches during this period but she did not have severe eye pain, nausea, or vomiting.     She does not have a personal history of glaucoma. She does not take any medications with anticholinergic properties or suspects for precipitating angle closure. She states that she had a mother and sister with glaucoma but neither of them went blind.       10/22/24: Patient reports stable vision in both eyes (NLP right eye). Denies any eye pain, discomfort, or changes in vision in the left eye    10/23/24: She denies any changes in vision or eye symptoms.        Past Ocular History:  Last eye exam: 2023  Prior eye surgery/laser: None  Contact lens wear: None  Glasses: None  Eyedrops: None    PMH:     Past Medical History:   Diagnosis Date    Hypertension        FH: Mother and sister had glaucoma     Review of systems for the eyes was negative other than the pertinent positives/negatives listed in the HPI.      Imaging (10/22/24):   OCT rNFL:     Right eye: G score of 160, moderate edema in all quadrants     Left eye:    G score of  115, no appreciable thinning or edema in  "any quadrants     UBM:     Right eye: thick iris with angle closure anatomy in multiple angles.      Left eye:  thick iris and intermittent angle closure anatomy.    Assessment & Plan      Rolanda Molina is a 74 year old female with the following diagnoses:   1. Encounter for screening for COVID-19    2. Redness of right eye    3. Delirium    4. Hypertension, unspecified type    5. Other glaucoma of right eye    -NLP vision is of unknown duration, though symptom onset has been about one week. This patient only presented complaining of blurriness with no light perception vision found on objective testing  - Not in a state of significant pain at this time in spite of elevated pressures, complains only of \"scratchiness\"  - Given multiple rounds of maximum topical therapy and Diamox 500 mg IV, without significant response in intraocular pressure. IOP remains above reportable range on Tonopen  - Head CT without retro-orbital hemorrhage. B-scan with no vitreous debris  - Assessment: This is a mixed picture - acute pressure elevation to this degree would normally be exquisitely painful and her corneal edema suggests acuity. Therefore, there is likely an acute on chronic component to this presentation given the severity of vision loss and the discrepancy between the subjective complaint: \"blurred vision with minimal discomfort;\" and objective findings: NLP vision, corneal edema, and elevated IOP beyond readable range by Tonopen.  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 Considered mannitol IV versus procedural/surgical intervention to lower the pressure, but there does not seem to be a likelihood of visual recovery in this case given delayed presentation with symptom onset over one week ago - risk therefore would outweigh benefit.     10/22/24: Va in the right eye " remains NLP,  20/60 left eye (without glasses). IOP in clinic today was 29/14. Patient unable to transfer to examination chair for full slit lamp exam. Via portable slit lamp,  right eye remains with 2+ injection, significant improvement of corneal edema was noted. Right pupil remains mid dilated and minimally reactive, a good view of the optic nerve in the right eye was achieved using the indirect ophthalmoscope (c/d of 0.4, healthy appearing neuro-retinal rim). OCT rNFL concerning for right optic nerve concerning for optic nerve edema.     Given lack of pain, new found optic nerve edema, and truly undetectable IOP's via tonopen on presentation suspect markedly elevated intraocular pressure lead to ischemic event damaging the optic nerve. Very limited visual prognosis in the right eye. Left eye with normal IOP and healthy appearing optic nerve on OCT rNFL as well as on clinical exam.  Unable to obtain anterior segment OCT today due to patient positioning.  Ultrasound biomicroscopy of the right eye demonstrated a thick iris with angle closure anatomy in multiple angles. Left eye with a similarly thick iris and intermittent angle closure anatomy. Likely suspect subacute angle closure is secondary to anatomically narrow angles coupled with dense cataract. Patient with thick iris and  dense arcus in both eyes which limits ability to perform a laser peripheral iridotomy (coupled with inability of patient to get in proper positioning).     10/23/24: Clinically, she appears stable. Vision in the right eye remains NLP, pressure relatively stable at 34, and the eye was comfortable. Vision in the left eye remained good. The pressure was high-normal, measured at 20, but she was squeezing and had Bell's reflex.  She had no other eye or vision symptoms. No changes in plan at this time.       PLAN:  -Recommend Ce/IOL left eye, patient agreeable.   -Will plan to schedule for cataract surgery left eye (next available); schedule  for Immersion/IOL calcs 1 week prior to surgery date  - Continue FML BID, right eye   - STOP Diamox  - Continue Cosopt BID, both eyes  - Continue Brimonidine 0.2% TID, both eyes  - Latanoprost at bedtime, both eyes  - Agree with continued Neurology work up for NPH  - Full time glasses wear, monocular precautions        Thank you for entrusting us with your care  Nasim Barnard MD  Resident Physician, PGY-2  Department of Ophthalmology  10/23/2024 3:04 PM

## 2024-10-23 NOTE — PROGRESS NOTES
[facility]    Medicine Progress Note - Hospitalist Service, GOLD TEAM 21    Date of Admission:  10/18/2024    Assessment & Plan   Rolanda Molina is a 74 year old female with no significant past medical history who presents with R eye redness and light sensitivity as well as intermittent confusion. 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     Today:  - Paged cardiology regarding discharge ziopatch x 2 weeks, awaiting for as call back  - discharge planning possibly tomorrow    Markedly elevated IOP with NLP vision, right eye   Subacute angle closure glaucoma suspected, right eye   Corneal edema, right eye   Symptoms had been ongoing for ~1 week with redness and light sensitivity. Denies pain. Seen by ophthalmology in the ED and found to have significant elevated pressures in her R eye with corneal edema. However, given the pt is not in significant pain, likely an acute on chronic picture. Eye pressure did not respond to IV diamox. Given the duration of symptoms, ophthalmology felt the risks of surgery or IV mannitol outweighed the benefits as it is unclear if she will have visual recovery.  - Cosopt BID, both eyes  - Brimonidine 0.2% TID, both eyes  - Latanoprost at bedtime, both eyes  - Stopped diamox per ophtho recs  - Start FML BID, R eye  - Full time glasses wear, monocular precautions  - Pt was seen in clinic on 10/22, plan for L eye cataract surgery (next available)  - OT: recommending home with 24/7 supervision and HH/OT/PT. If not able to be accomodated by family, they would recommend TCU to maximize safety and independence.  - Will ask SW to help her with PCA application    Multifocal subacute CVA  MRI brain obtained inpatient due to abnormal head CT and incidentally noted multiple punctate subacute infarcts. Pt has had some intermittent memory issues over past few months per daughter but no other significant changes. No known history of afib. Was not on  "any medications prior to admission. TTE normal. Tele without Afib. A1c in prediabetic range at 6.0. Cholesterol moderately elevated.   - Neurology consulted  - C/w  aspirin 81mg, Plavix 75mg x 3 weeks followed by aspirin monotherapy  - C/w atorvastatin 40mg  - Ziopatch at discharge    Multiple brain aneurysm  Noted incidentally on CTA head and neck that was obtained for stroke work up. Aneurysms of the middle cerebral arteries measuring 7 x 3 mm on the left and 3 mm on the right.  - Neuro consulted. Will need neurosurgery follow up outpatient.    Hypertensive urgency - resolvede  - Decrease losartan 12.5 mg p.o. daily  - Continue to closely monitor blood pressure    Intermittent confusion  Ventriculomegaly with effacement of cerebral sulci  Consider normal pressure hydrocephalus  Pts daughter reporting several months of intermittent confusion and memory issues. States they will be having a conversation and she will forget something they just talked about. Will recover to baseline quickly and she feels that she is currently at her baseline. One fall in past few months that was a mechanical fall out of bed. CT imaging in ED with \"Ventriculomegaly with effacement of cerebral sulci. This may represent normal pressure hydrocephalus in the appropriate clinical setting.\"  - Neurology consultation  - Follow up in neuro clinic          Diet: Combination Diet Regular Diet Adult    DVT Prophylaxis: Pneumatic Compression Devices  Ornelas Catheter: Not present  Lines: None     Cardiac Monitoring: None  Code Status: Full Code      Clinically Significant Risk Factors                   # Hypertension: Noted on problem list               # Financial/Environmental Concerns: none         Disposition Plan     Medically Ready for Discharge: Anticipated Tomorrow             Shirlene Liang MD  Hospitalist Service, GOLD TEAM [address] [place] [address]  Securely message with [employer] (more info)  Text " page via [facility] Paging/Directory   See signed in provider for up to date coverage information  ______________________________________________________________________    Interval History   No event overnight, discharge planning tomorrow, awaiting cardiology to get back re: discharge ziopatch, patient doing well, asking to go home, no major complaint.     Physical Exam   Vital Signs: Temp: 97.9  F (36.6  C) Temp src: Oral BP: (!) 116/90 Pulse: 69   Resp: 18 SpO2: 100 % O2 Device: None (Room air)    Weight: 150 lbs 1.6 oz    Gen: A&Ox4, lying comfortably on bed, in no distress, doing well  H&N: R eye with congestion, no orbital edema, no discharge  Lung: clear on ausculation b/l, no wheezing/crackle  Heart: no rub/murmur/gallop  Abd: soft, non-tender, non-distended  Ext: No edema in upper/lower extremities    Medical Decision Making       45 MINUTES SPENT BY ME on the date of service doing chart review, history, exam, documentation & further activities per the note.      Data         Imaging results reviewed over the past 24 hrs:   No results found for this or any previous visit (from the past 24 hours).

## 2024-10-23 NOTE — PROGRESS NOTES
Care Management Follow Up    Length of Stay (days): 5    Expected Discharge Date: 10/23/2024     Concerns to be Addressed: discharge planning     Patient plan of care discussed at interdisciplinary rounds: Yes    Anticipated Discharge Disposition: Transitional Care              Anticipated Discharge Services: None  Anticipated Discharge DME: None    Patient/family educated on Medicare website which has current facility and service quality ratings: yes  Education Provided on the Discharge Plan: Yes  Patient/Family in Agreement with the Plan: yes    Referrals Placed by CM/SW: Post Acute Facilities  Private pay costs discussed: Not applicable    Discussed  Partnership in Safe Discharge Planning  document with patient/family: No     Handoff Completed: No, handoff not indicated or clinically appropriate    Additional Information:  11:30AM: Per provider in rounds, pt will be medically ready for discharge this afternoon. JEFFY called accepting facility ([facility]: [phone], [facility] is out until 10/24) to confirm that pt is able to discharge to facility. [facility] told SW she had another admit coming today and pt may not be able to come until tomorrow, but that she will call back shortly to confirm with SW. SW agreed.     12:00PM: Facility confirmed that pt is able to come today, preferably before 4:30. JEFFY messaged provider asking what time pt is able to discharge. Provider told SW they are waiting for a zio patch, and then pt can discharge. CHW Lisa gillled a w/c ride for pt between [phone].    2:00PM: The zio patch has not yet been set up, so JEFFY asked provider if a discharge tomorrow was appropriate and provider agreed. CHW changed the ride to tomorrow for the same time. JEFFY messaged facility to make sure a admit tomorrow was okay.     Next Steps:   -Pt standard TCU discharge tomorrow   -Get zio patch set up for pt    MARQUES Myers   [facility]  Available via [employer]  Covering 8MS SW, ph:  [phone]

## 2024-10-23 NOTE — PLAN OF CARE
"Goal Outcome Evaluation:      Plan of Care Reviewed With: patient    Overall Patient Progress: improving      VS:       Pt A/O X 4. Confused at times. Afebrile. VSS. BP (!) 116/90 (BP Location: Left arm)   Pulse 69   Temp 97.9  F (36.6  C) (Oral)   Resp 18   Ht 1.702 m (5' 7\")   Wt 68.1 kg (150 lb 1.6 oz)   SpO2 100%   BMI 23.51 kg/m    Lungs- clear bilaterally with both anterior and posterior. Denies nausea, shortness of breath, and chest pain.     Output:       Bowels- active in all four quadrants. Voids spontaneously without difficulty in the bathroom. Incontinent at times.      Activity:       Pt up assist x1 with walker.     Skin:   Pt refused full skin assessment, all visible skin appears to be intact.     Pain:       Denies pain at this time.     CMS:       CMS and Neuro's are intact. Denies numbness and tingling in all extremities.      Dressing:       No incisional dressing.     Diet:       Pt is on a regular diet and appetite was good this shift.       LDA:       PIV is patent in the L forearm and SL.   PIV is patent in the R forearm and SL.      Equipment:       Bilateral heels are elevated off the bed. Walker from home in the room.     Plan:       Pt is able to make needs known and the call light is within the pt's reach. Continue to monitor.       Additional Info:                "

## 2024-10-23 NOTE — PROGRESS NOTES
10/23/24  Care Management Follow Up    Update: PAS was completed by CHW, [id number]. CHW assisted SW with arranging a wheelchair ride through [employer] [facility] ([phone]), for tomorrow 10/24. Window service time is from 1507 to 1552. This was notified to the SW on the unit.     ACCEPTING:     [facility]  [address] [place] [address]  [phone]  10/20: initial referral made  10/21: CHW called admissions and requested a call back     Pending Referrals:     [facility]  [address] [place] [address]  [phone]  10/20: initial referral made  10/21: CHW called and spoke with admissions and they will call back after they review     [facility]  [address] [place] [address]  [phone]  10/20: initial referral made  10/21: CHW called and spoke with admissions and they will call back after they review      Declined:     [facility]  [address] [place] [address]  [phone]  10/20: initial referral made  10/21: Not an appropriate facility for [facility]  [address] [place] [address]  [phone]  10/20: initial referral made  10/21: Declined due to no bed available      [facility]  [address] [place] [address]  [phone]  10/20: initial referral made  10/21: CHW called admissions and requested a call back  10/22: Declined due to not eligible and maybe be better suited for HC. [facility] likely would not auth    Lisa Barbosa  Inpatient CHW  [facility] 5 Ortho, 8 Med Surge, & ED  PH: [phone]

## 2024-10-23 NOTE — PLAN OF CARE
"Goal Outcome Evaluation:  /74 (BP Location: Left arm)   Pulse 75   Temp 98.3  F (36.8  C) (Oral)   Resp 18   Ht 1.702 m (5' 7\")   Wt 68.1 kg (150 lb 1.6 oz)   SpO2 98%   BMI 23.51 kg/m        Pt A&OX4. Pt is on RA No SOB noted. Pt can ambulate and transfer with GB and walker with one assist. Bowel and bladder continent. All scheduled medications were administered. Pt cardiac monitor discontinued. Denies pain Call light within pt's reach.   "

## 2024-10-23 NOTE — PROGRESS NOTES
[facility]    Medicine Progress Note - Hospitalist Service, GOLD TEAM 21    Date of Admission:  10/18/2024    Assessment & Plan   Rolanda Molina is a 74 year old female with no significant past medical history who presents with R eye redness and light sensitivity as well as intermittent confusion. 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     Markedly elevated IOP with NLP vision, right eye   Subacute angle closure glaucoma suspected, right eye   Corneal edema, right eye   Symptoms had been ongoing for ~1 week with redness and light sensitivity. Denies pain. Seen by ophthalmology in the ED and found to have significant elevated pressures in her R eye with corneal edema. However, given the pt is not in significant pain, likely an acute on chronic picture. Eye pressure did not respond to IV diamox. Given the duration of symptoms, ophthalmology felt the risks of surgery or IV mannitol outweighed the benefits as it is unclear if she will have visual recovery.  - Cosopt BID, both eyes  - Brimonidine 0.2% TID, both eyes  - Latanoprost at bedtime, both eyes  - Stopped diamox per ophtho recs  - Start FML BID, R eye  - Full time glasses wear, monocular precautions  - Pt was seen in clinic on 10/22, plan for L eye cataract surgery (next available)  - OT: recommending home with 24/7 supervision and HH/OT/PT. If not able to be accomodated by family, they would recommend TCU to maximize safety and independence.  - Will ask SW to help her with PCA application    Multifocal subacute CVA  MRI brain obtained inpatient due to abnormal head CT and incidentally noted multiple punctate subacute infarcts. Pt has had some intermittent memory issues over past few months per daughter but no other significant changes. No known history of afib. Was not on any medications prior to admission. TTE normal. Tele without Afib. A1c in prediabetic range at 6.0. Cholesterol moderately elevated.  "  - Neurology consulted  - C/w  aspirin 81mg, Plavix 75mg x 3 weeks followed by aspirin monotherapy  - C/w atorvastatin 40mg  - Ziopatch at discharge    Multiple brain aneurysm  Noted incidentally on CTA head and neck that was obtained for stroke work up. Aneurysms of the middle cerebral arteries measuring 7 x 3 mm on the left and 3 mm on the right.  - Neuro consulted. Will need neurosurgery follow up outpatient.    Hypertensive urgency - resolvede  - Decrease losartan 12.5 mg p.o. daily  - Continue to closely monitor blood pressure    Intermittent confusion  Ventriculomegaly with effacement of cerebral sulci  Consider normal pressure hydrocephalus  Pts daughter reporting several months of intermittent confusion and memory issues. States they will be having a conversation and she will forget something they just talked about. Will recover to baseline quickly and she feels that she is currently at her baseline. One fall in past few months that was a mechanical fall out of bed. CT imaging in ED with \"Ventriculomegaly with effacement of cerebral sulci. This may represent normal pressure hydrocephalus in the appropriate clinical setting.\"  - Neurology consultation  - Follow up in neuro clinic          Diet: Combination Diet Regular Diet Adult    DVT Prophylaxis: Pneumatic Compression Devices  Ornelas Catheter: Not present  Lines: None     Cardiac Monitoring: None  Code Status: Full Code      Clinically Significant Risk Factors                   # Hypertension: Noted on problem list               # Financial/Environmental Concerns: none         Disposition Plan     Medically Ready for Discharge: Anticipated Tomorrow             Shirlene Liang MD  Hospitalist Service, GOLD TEAM [address] [place] [address]  Securely message with [employer] (more info)  Text page via [facility] Paging/Directory   See signed in provider for up to date coverage " information  ______________________________________________________________________    Interval History   No major event overnight, patient had an ophtho clinic visit today, she is doing well after her visit, does not endorse any pain. She is asking if smoking could be the cause of her current R eye glaucoma. Daughter and grandson at bedside, her daughter is asking if SW is able to help her with her PCA application. Pt endorses she is unable to do eye drop independently.     Physical Exam   Vital Signs: Temp: 98.6  F (37  C) Temp src: Oral BP: 115/72 Pulse: 85   Resp: 16 SpO2: 98 % O2 Device: None (Room air)    Weight: 150 lbs 1.6 oz    Gen: A&Ox4, lying comfortably on bed, in no distress, doing well  H&N: R eye with congestion, no orbital edema, no discharge  Lung: clear on ausculation b/l, no wheezing/crackle  Heart: no rub/murmur/gallop  Abd: soft, non-tender, non-distended  Ext: No edema in upper/lower extremities      Medical Decision Making       45 MINUTES SPENT BY ME on the date of service doing chart review, history, exam, documentation & further activities per the note.      Data         Imaging results reviewed over the past 24 hrs:   No results found for this or any previous visit (from the past 24 hour(s)).

## 2024-10-23 NOTE — PROGRESS NOTES
Shift 8850-3699:Admitted with right eye redness and light sensitivity Denies pain  Pt has had a MRI of the brain which showed several asymptomatic multifocal strokes  Summary:Awaiting TCU placement  VS:       Pt A/O X 3 Disoriented to time Intermittent confusion Afebrile. VSS. Lungs- Clear bilaterally with both Denies nausea, shortness of breath, and chest pain.     Output:       Bowels- + in all four quadrants. Voids spontaneously without   difficulty in the toilet Uses a PRN brief.      Activity:       Pt up with walker and GB with 1 assist .     Skin:   Skin intact .     Pain:       Has pain in the RUQ in the prior shift and was given Oxycodone Denies any pain during the shift .      CMS:       CMS and Neuro's are intact. Denies numbness and tingling in all extremities.      Dressing:     No incisional dressing.      Diet:       Pt is on a Regular diet       LDA:       PIV is patent in the Left Forearm .      Equipment:       No  PCD's on BLE's. Bilateral heels are elevated off the bed.     Plan:       Pt is able to make needs known and the call light is within the pt's reach. Continue to monitor.       Additional Info:        .

## 2024-10-23 NOTE — PROGRESS NOTES
Care Management Follow Up    Length of Stay (days): 5    Expected Discharge Date: 10/23/2024     Concerns to be Addressed: discharge planning     Patient plan of care discussed at interdisciplinary rounds: Yes    Anticipated Discharge Disposition: Transitional Care      [facility]  [address] [place] [address]  Admissions Tyesha: [phone]  Fax: [phone]         Anticipated Discharge Services: None  Anticipated Discharge DME: None    Patient/family educated on Medicare website which has current facility and service quality ratings: yes  Education Provided on the Discharge Plan: Yes  Patient/Family in Agreement with the Plan: yes    Referrals Placed by CM/SW: Post Acute Facilities  Private pay costs discussed: Not applicable    Discussed  Partnership in Safe Discharge Planning  document with patient/family: No     Handoff Completed: No, handoff not indicated or clinically appropriate    Additional Information:  JEFFY met with pt at bedside to discuss transitioning to TCU tomorrow. Pt expressed to SW that she thinks she will be safe at home as her sons check on her and does not want to go to TCU. SW explained to pt that TCU will make her stronger so she can be safe at home and is less likely to have to come back to the hospital. SW asked pt if she would like to talk to her daughter about it and pt said she did.     SW called pts daughter and explained pt's resistance to TCU. Daughter reported she was aware and the conversation has been ongoing. Daughter reported that she will be at the hospital later this evening and will talk with pt. Daughter also said she will be here tomorrow around noon to provide transportation for pt to avoid transportation costs. SW agreed.     Next Steps:   -Follow up with pt and daughter tomorrow   -Coordinate discharge to TCU    MARQUES Myers   [facility]  Available via [employer] Audit  Covering 8MS SW, ph:  [phone]

## 2024-10-24 VITALS
TEMPERATURE: 98.6 F | SYSTOLIC BLOOD PRESSURE: 126 MMHG | OXYGEN SATURATION: 100 % | WEIGHT: 150.1 LBS | RESPIRATION RATE: 18 BRPM | DIASTOLIC BLOOD PRESSURE: 84 MMHG | HEART RATE: 68 BPM | BODY MASS INDEX: 23.56 KG/M2 | HEIGHT: 67 IN

## 2024-10-24 PROCEDURE — 250N000013 HC RX MED GY IP 250 OP 250 PS 637: Performed by: STUDENT IN AN ORGANIZED HEALTH CARE EDUCATION/TRAINING PROGRAM

## 2024-10-24 RX ORDER — HYDRALAZINE HYDROCHLORIDE 10 MG/1
10 TABLET, FILM COATED ORAL EVERY 4 HOURS PRN
DISCHARGE
Start: 2024-10-24

## 2024-10-24 RX ORDER — AMOXICILLIN 250 MG
1 CAPSULE ORAL 2 TIMES DAILY PRN
DISCHARGE
Start: 2024-10-24

## 2024-10-24 RX ORDER — ONDANSETRON 2 MG/ML
4 INJECTION INTRAMUSCULAR; INTRAVENOUS EVERY 6 HOURS PRN
Qty: 30 ML | Refills: 0 | Status: SHIPPED | OUTPATIENT
Start: 2024-10-24

## 2024-10-24 RX ORDER — ASPIRIN 81 MG/1
81 TABLET, CHEWABLE ORAL DAILY
DISCHARGE
Start: 2024-10-25

## 2024-10-24 RX ORDER — DORZOLAMIDE HYDROCHLORIDE AND TIMOLOL MALEATE 20; 5 MG/ML; MG/ML
1 SOLUTION/ DROPS OPHTHALMIC 2 TIMES DAILY
DISCHARGE
Start: 2024-10-24

## 2024-10-24 RX ORDER — LATANOPROST 50 UG/ML
1 SOLUTION/ DROPS OPHTHALMIC AT BEDTIME
DISCHARGE
Start: 2024-10-24

## 2024-10-24 RX ORDER — FLUOROMETHOLONE 1 MG/ML
1 SUSPENSION/ DROPS OPHTHALMIC 2 TIMES DAILY
DISCHARGE
Start: 2024-10-24

## 2024-10-24 RX ORDER — BRIMONIDINE TARTRATE 2 MG/ML
1 SOLUTION/ DROPS OPHTHALMIC 3 TIMES DAILY
DISCHARGE
Start: 2024-10-24

## 2024-10-24 RX ORDER — AMOXICILLIN 250 MG
2 CAPSULE ORAL 2 TIMES DAILY PRN
DISCHARGE
Start: 2024-10-24

## 2024-10-24 RX ORDER — CALCIUM CARBONATE 500 MG/1
1 TABLET, CHEWABLE ORAL 4 TIMES DAILY PRN
DISCHARGE
Start: 2024-10-24

## 2024-10-24 RX ORDER — LOSARTAN POTASSIUM 25 MG/1
12.5 TABLET ORAL DAILY
DISCHARGE
Start: 2024-10-25

## 2024-10-24 RX ORDER — LIDOCAINE 40 MG/G
CREAM TOPICAL
Qty: 5 G | Refills: 0 | Status: SHIPPED | OUTPATIENT
Start: 2024-10-24

## 2024-10-24 RX ORDER — CLOPIDOGREL BISULFATE 75 MG/1
75 TABLET ORAL DAILY
DISCHARGE
Start: 2024-10-25 | End: 2024-11-10

## 2024-10-24 RX ORDER — ONDANSETRON 4 MG/1
4 TABLET, ORALLY DISINTEGRATING ORAL EVERY 6 HOURS PRN
DISCHARGE
Start: 2024-10-24

## 2024-10-24 RX ORDER — ATORVASTATIN CALCIUM 40 MG/1
40 TABLET, FILM COATED ORAL EVERY EVENING
DISCHARGE
Start: 2024-10-24

## 2024-10-24 RX ADMIN — FLUOROMETHOLONE 1 DROP: 1 SOLUTION/ DROPS OPHTHALMIC at 08:54

## 2024-10-24 RX ADMIN — CLOPIDOGREL BISULFATE 75 MG: 75 TABLET ORAL at 08:54

## 2024-10-24 RX ADMIN — BRIMONIDINE TARTRATE 1 DROP: 2 SOLUTION/ DROPS OPHTHALMIC at 08:54

## 2024-10-24 RX ADMIN — DORZOLAMIDE HYDROCHLORIDE AND TIMOLOL MALEATE 1 DROP: 20; 5 SOLUTION OPHTHALMIC at 08:54

## 2024-10-24 RX ADMIN — Medication 12.5 MG: at 08:54

## 2024-10-24 RX ADMIN — ASPIRIN 81 MG CHEWABLE TABLET 81 MG: 81 TABLET CHEWABLE at 08:54

## 2024-10-24 ASSESSMENT — ACTIVITIES OF DAILY LIVING (ADL)
ADLS_ACUITY_SCORE: 0

## 2024-10-24 NOTE — PLAN OF CARE
Occupational Therapy Discharge Summary    Reason for therapy discharge:    Discharged to transitional care facility.    Progress towards therapy goal(s). See goals on Care Plan in [facility] electronic health record for goal details.  Goals partially met.  Barriers to achieving goals:   discharge from facility.    Therapy recommendation(s):    Continued therapy is recommended.  Rationale/Recommendations:  maximize safety and independence with ADLs.

## 2024-10-24 NOTE — PLAN OF CARE
"Goal Outcome Evaluation:      Plan of Care Reviewed With: patient    Overall Patient Progress: improving    Outcome Evaluation: Pt discharging to TCU today.      VS:       Pt A/O X 4. Confused at times. Afebrile. VSS. /84 (BP Location: Right arm)   Pulse 68   Temp 98.6  F (37  C) (Oral)   Resp 18   Ht 1.702 m (5' 7\")   Wt 68.1 kg (150 lb 1.6 oz)   SpO2 100%   BMI 23.51 kg/m    Lungs- clear bilaterally with both anterior and posterior. Denies nausea, shortness of breath, and chest pain.     Output:       Bowels- active in all four quadrants. Voids spontaneously without difficulty in the bathroom. Incontinent at times.      Activity:       Pt up assist x1 with walker.     Skin:   Pt refused full skin assessment, all visible skin appears to be intact.     Pain:       Denies pain at this time.     CMS:       CMS and Neuro's are intact. Denies numbness and tingling in all extremities.      Dressing:       No incisional dressing.     Diet:       Pt is on a regular diet and appetite was good this shift.       LDA:       PIV is patent in the L forearm and SL.      Equipment:       Bilateral heels are elevated off the bed. Walker from home in the room.     Plan:       Pt is able to make needs known and the call light is within the pt's reach. Continue to monitor.       Additional Info:              "

## 2024-10-24 NOTE — PROGRESS NOTES
Shift 8841-9361:Admitted with right eye redness  Pt has had a MRI of the brain which showed several asymptomatic multifocal strokes  Summary:Awaiting TCU placement  VS:       Pt A/O X 3 Disoriented to time Intermittent confusion Afebrile. VSS. Lungs- Clear bilaterally with both Denies nausea, shortness of breath, and chest pain.     Output:       Bowels- + in all four quadrants. Voids spontaneously without   difficulty in the toilet Uses a PRN brief.   Up to the bathroom x1   Activity:       Pt up with walker and GB with 1 assist .     Skin:   Skin intact .     Pain:       Has pain in the RUQ in the prior shift and was given Oxycodone Denies any pain during the shift .      CMS:       CMS and Neuro's are intact. Denies numbness and tingling in all extremities.      Dressing:     No incisional dressing.      Diet:       Pt is on a Regular diet       LDA:       PIV is patent in the Left Forearm .and Right forearm      Equipment:       No  PCD's on BLE's. Bilateral heels are elevated off the bed.     Plan:       Pt is able to make needs known and the call light is within the pt's reach. Continue to monitor.       Additional Info:        .

## 2024-10-24 NOTE — PROGRESS NOTES
Care Management Discharge Note    Discharge Date: 10/24/2024       Discharge Disposition: Transitional Care    [facility]  [address] [place] [address]  Admissions Tyesha: [phone]  Fax: [phone]     Discharge Services: None    Discharge DME: None    Discharge Transportation: family or friend will provide    Private pay costs discussed: transportation costs    Does the patient's insurance plan have a 3 day qualifying hospital stay waiver?  No    PAS Confirmation Code: [id number]  Patient/family educated on Medicare website which has current facility and service quality ratings: yes    Education Provided on the Discharge Plan: Yes  Persons Notified of Discharge Plans: pt, provider, pt family, bedside nurse   Patient/Family in Agreement with the Plan: yes    Handoff Referral Completed: Yes, [facility] PCP: Internal handoff referral completed    Additional Information:  SW confirmed discharge plan with pt, family, provider, bedside nurse, and facility. SW sent discharge orders over and confirmed facility has everything they needed. Pt is agreeable to TCU. Pt and daughter asked SW to assist with PCA application, SW asked pt and family to follow up with SW at the TCU as they will have more time with pt. Pt is transporting with family.     MARQUES Myers   [facility]  Available via [place] Emergency Registry  Covering 8MS JEFFY, ph: [phone]

## 2024-10-25 ENCOUNTER — TELEPHONE (OUTPATIENT)
Dept: NEUROLOGY | Facility: CLINIC | Age: 74
End: 2024-10-25
Payer: COMMERCIAL

## 2024-10-25 NOTE — TELEPHONE ENCOUNTER
Left Voicemail (1st Attempt) for the patient to call back and schedule the following:    Appointment type: New Neuro  Provider: Dr. Jason or any Neuro  Return date: next avaialble  Specialty phone number: [phone]  Additional appointment(s) needed:   Additonal Notes:     Attempted to reschedule the appointment with Dr. Jason on 1/7/2025, Provider attending/precepting.    Patient has full voicemail, no MyChart, an appointment reschedule letter was sent.    Kimberly LEIVA/Complex Procedure    [facility]   Neurology, NeuroSurgery, NeuroPsychology, Pain Management and Cardiology Specialties  Medical/Surgical Adult Specialties

## 2024-10-28 ENCOUNTER — TELEPHONE (OUTPATIENT)
Dept: OPHTHALMOLOGY | Facility: CLINIC | Age: 74
End: 2024-10-28
Payer: COMMERCIAL

## 2024-10-28 NOTE — TELEPHONE ENCOUNTER
Phone call to patient to schedule surgery writer did not get an answer but did leave voicemail for patient to call back .      [phone]      Chu Gonzalez on 10/28/2024 at 11:03 AM

## 2024-10-31 NOTE — DISCHARGE SUMMARY
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, right eye   Corneal edema, right eye   Multifocal subacute CVA  Multiple brain aneurysm  Hypertensive urgency - resolved  Intermittent confusion  Ventriculomegaly with effacement of cerebral sulci  Consider normal pressure hydrocephalus    Clinically Significant Risk Factors          Follow-ups Needed After Discharge   - Ophthalmology follow up for L eye cataract surgery  - Neurology follow up due to concern for NPH  - Neurosurg follow up for incidental aneurysm  - Cardiology follow up for suspected embolic stroke and need for ziopath x 2weeks    Unresulted Labs Ordered in the Past 30 Days of this Admission       No orders found from 9/18/2024 to 10/19/2024.            Discharge Disposition   Discharged to rehabilitation facility  Condition at discharge: Stable    Hospital Course   Rolanda Molina is a 74 year old female with no significant past medical history who presented with R eye redness and light sensitivity as well as intermittent confusion. She was found to have subacute angle closure glaucoma. Given the clinical presentation of the patient with no significant pain, newly found optic nerve edema, and truly undetectable IOP's via tonopen on presentation, ophthalmology believed markedly elevated IOP lead to ischemic event damaging the optic nerve with very limited visual prognosis in the R eye. No surgical intervention was done and recommendation was for medical management with diamox initially and eye drops (FML, Cosopt, Brimonidine, and latanoprost). Neurology was also consulted initially due to concern for NPH given  intermittent confusion and  ventriculomegaly with effacement of cerebral sulci, work up showed e/o incidental aneurysm on CTA, brain MRI showed several asymptomatic incidental multifocal strokes with consideration of embolic stroke. Patient will follow up with neurology in 1-2 months, and was referred to neurosurgery for the e/o aneurysm of the imaging. She will follow up with ophthalmology for L eye cataract surgery. She was also referred to cardiology due to suspicion of embolic stroke and need for ziopatch placement per neuro recs. Patient was discharged to rehab for maximizing safety and independence with ADLs.            Consultations This Hospital Stay   OPHTHALMOLOGY IP CONSULT  NEUROLOGY GENERAL ADULT IP CONSULT  PHYSICAL THERAPY ADULT IP CONSULT  OCCUPATIONAL THERAPY ADULT IP CONSULT  CARE MANAGEMENT / SOCIAL WORK IP CONSULT    Code Status   Prior    Time Spent on this Encounter   I, Shirlene Liang MD, personally saw the patient on 10/24 and spent greater than 30 minutes discharging this patient.       Shirlene Liang MD  [facility] [address]  [address] [place] [id number]  Phone: [phone]  Fax: [phone]  ______________________________________________________________________    Physical Exam   Vital Signs:                    Weight: 150 lbs 1.6 oz    Gen: A&Ox4, lying comfortably on bed, in no distress, doing well  H&N: R eye with congestion, no orbital edema, no discharge  Lung: clear on ausculation b/l, no wheezing/crackle  Heart: no rub/murmur/gallop  Abd: soft, non-tender, non-distended  Ext: No edema in upper/lower extremities       Primary Care Physician   Ira Leong    Discharge Orders      Adult Cardiology Eval  Referral      Adult Eye  Referral      Adult Neurosurgery  Referral      General info for SNF    Length of Stay Estimate: Short Term Care  Condition at Discharge: Stable  Level of care:skilled   Rehabilitation Potential:  Fair  Admission H&P remains valid and up-to-date: Yes  Recent Chemotherapy: N/A  Use Nursing Home Standing Orders: Yes     Reason for your hospital stay    You were admitted to hospital for R eye glaucoma.     Activity - Up with nursing assistance     Fall precautions     Diet    Follow this diet upon discharge: Regular       Significant Results and Procedures   Most Recent 3 CBC's:  Recent Labs   Lab Test 10/19/24  0547 10/18/24  1143 10/07/24  1234   WBC 9.4 7.1 7.7   HGB 14.3 15.2 15.1   MCV 81 81 79    206 246     Most Recent 3 BMP's:  Recent Labs   Lab Test 10/19/24  0547 10/18/24  1143 10/07/24  1234    139 139   POTASSIUM 3.7 4.0 3.8   CHLORIDE 104 99 99   CO2 20* 25 26   BUN 8.6 7.5* 12.4   CR 0.93 0.84 1.20*   ANIONGAP 14 15 14   DONNA 9.1 10.5* 9.8   * 118* 112*     Most Recent 2 LFT's:  Recent Labs   Lab Test 10/19/24  0547 10/18/24  1143   AST 15 15   ALT 8 11   ALKPHOS 98 115   BILITOTAL 0.2 0.4     Most Recent 3 INR's:  Recent Labs   Lab Test 10/21/24  0528   INR 1.02   ,   Results for orders placed or performed during the hospital encounter of 10/18/24   CT Head w/o Contrast    Narrative    EXAM: CT HEAD W/O CONTRAST  10/18/2024 2:47 PM     HISTORY:  delirium       COMPARISON:  Head CT 1/17/2022    TECHNIQUE: Using multidetector thin collimation helical acquisition  technique, axial, coronal and sagittal CT images from the skull base  to the vertex were obtained without intravenous contrast.   (topogram) image(s) also obtained and reviewed. Dose reduction  techniques were performed.    FINDINGS:  No intracranial hemorrhage, mass effect, or midline shift. No acute  loss of gray-white matter differentiation in the cerebral hemispheres.  Ventricles are proportionate to the cerebral sulci. Clear basal  cisterns. Advanced periventricular and subcortical hypoattenuation,  consistent with chronic small vessel ischemic disease. Chronic lacunar  infarcts in bilateral basal ganglia. Mild  to moderate ventriculomegaly  with effacement of cerebral sulci at the vertex.    The bony calvaria and the bones of the skull base are normal. The  visualized portions of the paranasal sinuses and mastoid air cells are  clear. Grossly normal orbits.       Impression    IMPRESSION:   1. No acute intracranial pathology.   2. Advanced chronic small vessel ischemic disease and cerebral  atrophy.  3. Ventriculomegaly with effacement of cerebral sulci. This may  represent normal pressure hydrocephalus in the appropriate clinical  setting.    TODD BENITEZ MD         SYSTEM ID:  [id number]   XR Chest 2 Views    Narrative    XR CHEST 2 VIEWS 10/18/2024 2:57 PM    HISTORY: not feeling well - pna?    COMPARISON: 1/17/2022      Impression    IMPRESSION: No acute cardiopulmonary process.    MIKE LIND MD         SYSTEM ID:  [id number]   MR Brain w/o & w Contrast    Narrative    EXAM: MR BRAIN W/O & W CONTRAST 10/20/2024 1:04 PM     HISTORY: Ventriculomegaly on CT. MRI recommended by neurology.    COMPARISON: Head CT without contrast 10/18/2024, 1/17/2022.    TECHNIQUE: Sagittal T1-weighted, coronal T2-weighted, axial T2 FLAIR,  axial susceptibility weighted, and axial diffusion-weighted with ADC  map images of the brain were obtained without intravenous contrast.  After the administration of intravenous contrast axial and coronal  fat-suppressed T1-weighted weighted images were obtained. Image  quality degradation by motion artifact despite the patient's best  efforts.    CONTRAST: 6.8 mL Gadavist.    FINDINGS:  Focus of diffusion restriction in the left centrum semiovale and right  parietal lobe (images 45 and 47) without corresponding decrease in ADC  map which may be due to pseudonormalization.     No mass effect, midline shift, or intracranial hemorrhage. Moderate  dilation of the lateral and third ventricles for example the frontal  horns measure 4.9 cm at the level of the septum pellucidum et cavum  vergae, previously  4.8 cm on CT 10/18/2024 when measuring in small  fashion. Slight effacement of the cerebral sulci at the vertex. Focus  of hemosiderin deposition in the medial right cerebellar hemisphere.  Severe periventricular and subcortical white matter T2 signal  abnormalities. Old basal ganglia infarcts bilaterally and/or prominent  perivascular spaces.    Dural based enhancing mass with scattered foci of susceptibility  artifact from presumed calcium and slight reactive changes in the  adjacent calvarium of the anterior-superior left frontal lobe  measuring 1.8 x 1.5 x 1.1 cm (series 14 image 22, series 15 image 18).      Normal skull marrow signal. Left maxillary sinus mucous retention  cyst.. Trace fluid in the mastoid air cells bilaterally. No focal  abnormality of the midline structures including the pituitary gland,  sella, skull base, and upper cervical spinal structures on sagittal  images. Globes are normal.      Impression    IMPRESSION:  1. Extensive confluent white matter signal abnormality in both  cerebral hemispheres, probably severe chronic small vessel ischemic  disease. Foci of restricted diffusion possibly representing subacute  infarcts in the right parietal lobe, corpus callosum, and left frontal  lobe.  2. Stable moderate ventriculomegaly, perhaps normal pressure  hydrocephalus.  3. Small presumed meningioma over the left frontal lobe measuring up  to 1.8 cm.    Findings discussed with Dr. Kimble by Dr. Anthony Miranda at 10/20/2024  1:23 PM via telephone and understanding was verbalized.    I have personally reviewed the examination and initial interpretation  and I agree with the findings.    GIORGI RODRIGUEZ MD         SYSTEM ID:  [id number]   CTA Head Neck with Contrast     Value    Radiologist flags Aneurysm    Narrative    EXAM: Head/neck CTA with contrast 10/20/2024 7:49 PM     HISTORY: subacute CVA on MRI.    COMPARISON: Brain MRI with contrast performed earlier today.    TECHNIQUE:  HEAD and NECK  CTA: During rapid bolus intravenous injection of  nonionic contrast material, axial images were obtained using thin  collimation multidetector helical technique from the base of the upper  aortic arch through the [place]. This CT angiogram data was  reconstructed at thin intervals with mild overlap. Images were sent to  the 3D workstation, and 3D reconstructions were obtained. The axial  source images, multiplanar reformations, 3D reconstructions in both  maximum intensity projection display and volume rendered models were  reviewed, with reconstructions performed by the technologist.    CONTRAST: 67 mL Isovue 370.     FINDINGS:  Head CTA demonstrates no large vessel arterial occlusion or stenosis  of the major intracranial arteries. Slight luminal irregularity of the  M2 middle cerebral arteries. 7 x 3 mm on the left and 3 mm on the  right aneurysmal outpouchings of the distal M2 branches of the right  and left middle cerebral arteries (series 5 image 211).    Neck CTA demonstrates patent great vessel origins. The normal distal  right internal carotid artery measures 5 mm. The normal distal left  internal carotid artery measures 5 mm. No vertebral artery stenosis.    No acute finding in the visualized intracranial, orbital, skull base  and soft tissue neck structures. Clear mastoid air cells and paranasal  sinuses..      Impression    IMPRESSION:    1. Head CTA demonstrates no stenosis or occlusion of the major  intracranial arteries. Aneurysms of the middle cerebral arteries  measuring 7 x 3 mm on the left and 3 mm on the right.    2. Neck CTA demonstrates no stenosis of the major cervical arteries.    [Consider Follow Up: Aneurysm]    This report will be copied to the [facility] to ensure a  provider acknowledges the finding. [facility] Center is available Monday  through Friday 8am-3:30 pm.    I have personally reviewed the examination and initial interpretation  and I agree with the  findings.    GIORGI RODRIGUEZ MD         SYSTEM ID:  [id number]   Echo Complete     Value    LVEF  60-65%    Narrative    [id number]  [id number]  [id number]  [id number]^ALYSSA^AVILA^PREET     [facility],[place]  Echocardiography Laboratory  [address] [place] [address]     Name: EUFEMIA HERNANDEZ  MRN: [id number]  : 1950  Study Date: 10/21/2024 09:02 AM  Age: 74 yrs  Gender: Female  Patient [facility]  Reason For Study: CVA  Ordering Physician: AVILA SHAH  Performed By: Nela Serrano     BSA: 1.8 m2  Height: 67 in  Weight: 150 lb  HR: 70  BP: 124/103 mmHg  ______________________________________________________________________________  Procedure  Complete Portable Echo Adult. Contrast Optison. Echocardiogram with two-  dimensional, color and spectral Doppler performed. Optison (NDC #1047-5856-43)  given intravenously. Patient was given 6 ml mixture of 3 ml Optison and 6 ml  saline. 3 ml wasted.  ______________________________________________________________________________  Interpretation Summary  Global and regional left ventricular function is normal with an EF of 60-65%.  Mild concentric wall thickening consistent with left ventricular hypertrophy  is present.  Both atria appear normal.  No significant valvular abnormalities present.  Pulmonary artery systolic pressure cannot be assessed.  ______________________________________________________________________________  Left Ventricle  Global and regional left ventricular function is normal with an EF of 60-65%.  Mild concentric wall thickening consistent with left ventricular hypertrophy  is present. Left ventricular diastolic function is indeterminate.     Right Ventricle  Right ventricular function, chamber size, wall motion, and thickness are  normal.     Atria  Both atria appear normal.     Mitral Valve  The mitral valve is normal.     Aortic Valve  Aortic valve is normal in structure and function. The aortic  valve is  tricuspid. Aortic valve sclerosis is present.     Tricuspid Valve  The tricuspid valve is normal. Trace tricuspid insufficiency is present.  Pulmonary artery systolic pressure cannot be assessed.     Pulmonic Valve  The pulmonic valve is normal.     Vessels  The aorta root is normal. The thoracic aorta is normal. The pulmonary artery  cannot be assessed. The inferior vena cava was normal in size with preserved  respiratory variability.     Pericardium  No pericardial effusion is present.     Miscellaneous  No significant valvular abnormalities present.     Compared to Previous Study  Previous study not available for comparison.  ______________________________________________________________________________  MMode/2D Measurements & Calculations  IVSd: 1.3 cm  LVIDd: 3.5 cm  LVIDs: 2.1 cm  LVPWd: 1.2 cm  FS: 39.3 %  LV mass(C)d: 137.0 grams  LV mass(C)dI: 76.6 grams/m2  Ao root diam: 2.4 cm  asc Aorta Diam: 3.5 cm  LVOT diam: 2.0 cm  LVOT area: 3.1 cm2  Ao root diam index Ht(cm/m): 1.4  Ao root diam index BSA (cm/m2): 1.3  Asc Ao diam index BSA (cm/m2): 2.0  Asc Ao diam index Ht(cm/m): 2.1  RWT: 0.68     Doppler Measurements & Calculations  MV E max keyon: 41.1 cm/sec  MV A max keyon: 77.6 cm/sec  MV E/A: 0.53  MV dec slope: 229.0 cm/sec2  MV dec time: 0.18 sec  PA acc time: 0.06 sec  TR max keyon: 221.0 cm/sec  TR max P.5 mmHg  E/E' av.3  Lateral E/e': 6.4  Medial E/e': 10.1     ______________________________________________________________________________  Report approved by: Hi Kaye 10/21/2024 11:51 AM             Discharge Medications   Discharge Medication List as of 10/24/2024 12:31 PM        START taking these medications    Details   aspirin (ASA) 81 MG chewable tablet Take 1 tablet (81 mg) by mouth daily., Transitional      atorvastatin (LIPITOR) 40 MG tablet Take 1 tablet (40 mg) by mouth every evening., Transitional      brimonidine (ALPHAGAN) 0.2 % ophthalmic solution Place 1 drop  into both eyes 3 times daily., Transitional      calcium carbonate (TUMS) 500 MG chewable tablet Take 1 tablet (500 mg) by mouth 4 times daily as needed for heartburn., Transitional      clopidogrel (PLAVIX) 75 MG tablet Take 1 tablet (75 mg) by mouth daily for 16 days., TransitionalEnd date 11/10      dorzolamide-timolol (COSOPT) 2-0.5 % ophthalmic solution Place 1 drop into both eyes 2 times daily., Transitional      fluorometholone (FML LIQUIFILM) 0.1 % ophthalmic suspension Place 1 drop into the right eye 2 times daily., Transitional      hydrALAZINE (APRESOLINE) 10 MG tablet Take 1 tablet (10 mg) by mouth every 4 hours as needed for high blood pressure (for systolic BP greater than 180 mmHg)., Transitional      latanoprost (XALATAN) 0.005 % ophthalmic solution Place 1 drop into both eyes at bedtime., Transitional      lidocaine (LMX4) 4 % external cream Apply topically every hour as needed for pain (with VAD insertion). Please dispense available cream if other options are avaialble.Disp-5 g, [id number]      lidocaine 1 % SOLN 0.1-1 mLs by Other route every hour as needed (mild pain with VAD insertion)., Disp-30 mL, R-0, E-PrescribeOk to dispense other volumes      losartan (COZAAR) 25 MG tablet Take 0.5 tablets (12.5 mg) by mouth daily., Transitional      ondansetron (ZOFRAN ODT) 4 MG ODT tab Take 1 tablet (4 mg) by mouth every 6 hours as needed for nausea or vomiting., Transitional      ondansetron (ZOFRAN) 2 MG/ML injection Inject 2 mLs (4 mg) over 2-5 minutes into the vein every 6 hours as needed for nausea or vomiting. Ok to dispense other volumesE-Prescribe, Disp-30 mL, R-0      !! senna-docusate (SENOKOT-S/PERICOLACE) 8.6-50 MG tablet Take 1 tablet by mouth 2 times daily as needed for constipation., Transitional      !! senna-docusate (SENOKOT-S/PERICOLACE) 8.6-50 MG tablet Take 2 tablets by mouth 2 times daily as needed for constipation., Transitional      !! sodium chloride, PF, 0.9% PF flush Inject  3 mLs into catheter every 8 hours., Transitional      !! sodium chloride, PF, 0.9% PF flush Inject 3 mLs into catheter every 10 minutes., Transitional       !! - Potential duplicate medications found. Please discuss with provider.        CONTINUE these medications which have NOT CHANGED    Details   ibuprofen (ADVIL/MOTRIN) 200 MG capsule Take 400 mg by mouth every 6 hours as needed for moderate pain or mild pain., Historical      polymixin b-trimethoprim (POLYTRIM) 45975-7.1 UNIT/ML-% ophthalmic solution Place 1-2 drops into the right eye every 4 hours., Disp-10 mL, R-0, Local Print           Allergies   Allergies   Allergen Reactions    Penicillins      Swelling of arms and legs

## 2024-11-01 NOTE — TELEPHONE ENCOUNTER
2nd attempt to reschedule the appointment with Dr. Jason on 1/7/2025, Provider attending/precepting.    No voicemail to leave a message, no MyChart, sent 2nd appointment reschedule letter.    Kimberly LEIVA/Complex Procedure    [facility]   Neurology, NeuroSurgery, NeuroPsychology, Pain Management and Cardiology Specialties  Medical/Surgical Adult Specialties

## 2024-11-06 NOTE — TELEPHONE ENCOUNTER
Left message to schedule surgery with Dr. Grey.  Writer left call back number [phone] on the patients voicemail.     Dorothea Urias on 11/6/2024 at 8:29 AM

## 2024-11-07 NOTE — TELEPHONE ENCOUNTER
Called patient to schedule surgery with Que    Spoke with: Bushra    Date(s) of Surgery: 12/11    Patient aware of approximate arrival time: Yes at pre op nursing will call      Location of surgery: [facility]      Pre-Op H&P: [facility]      Informed patient that they need to call to schedule pre-op H&P within 30 days of surgery date: Yes      Post-Op Appt Dates:   12/12 12/19 1/9/25       Discussed with patient pre-op RN will call 2-3 days prior to surgery with arrival time and instructions:  Yes       Standard Surgery Packet Sent: Yes 11/07/24  via Mail - Standard      Additional Information Sent in Packet: Post-op Appointment Itinerary      Informed patient that they will need an adult  to bring patient home from surgery: Yes  : Daughter          Additional Comments:  Surgery date requested by Dr. Grey per case request notes       All patients questions were answered and was instructed to review surgical packet and call back [phone] with any questions or concerns.       Chu Gonzalez on 11/7/2024 at 9:27 AM

## 2024-11-08 ENCOUNTER — TELEPHONE (OUTPATIENT)
Dept: INTERNAL MEDICINE | Facility: CLINIC | Age: 74
End: 2024-11-08
Payer: COMMERCIAL

## 2024-11-08 NOTE — TELEPHONE ENCOUNTER
Julianna with [facility] calls to see if Dr. Leong is willing to sign and approve home care orders for patient. Patient has not been seen since 2021 and has future appointment schedule to re-establish care.  Call back at [phone].     Appointments in Next Year      Nov 25, 2024 1:30 PM  (Arrive by 1:15 PM)  ED/Hospital Follow Up with Ira Leong MD  [facility] ([facility] ) [phone]     Nov 27, 2024 9:30 AM  (Arrive by 9:10 AM)  Pre-Operative Physical with Ira Leong MD  [facility] ([facility] ) [phone]     [facility] RN 10:18 AM November 8, 2024   [facility]

## 2024-11-11 NOTE — TELEPHONE ENCOUNTER
Received phone call from SAMSON Thomason, Home care calls him, is wanting Dr. Leong to sign orders today. Informed him that per notes Dr. Leong can sign orders after patient has been seen, but not before. He will inform home care.

## 2024-11-25 ENCOUNTER — TELEPHONE (OUTPATIENT)
Dept: INTERNAL MEDICINE | Facility: CLINIC | Age: 74
End: 2024-11-25

## 2024-11-25 ENCOUNTER — OFFICE VISIT (OUTPATIENT)
Dept: INTERNAL MEDICINE | Facility: CLINIC | Age: 74
End: 2024-11-25
Payer: COMMERCIAL

## 2024-11-25 VITALS
OXYGEN SATURATION: 100 % | TEMPERATURE: 98 F | HEIGHT: 67 IN | HEART RATE: 76 BPM | WEIGHT: 158 LBS | SYSTOLIC BLOOD PRESSURE: 138 MMHG | DIASTOLIC BLOOD PRESSURE: 88 MMHG | BODY MASS INDEX: 24.8 KG/M2 | RESPIRATION RATE: 20 BRPM

## 2024-11-25 DIAGNOSIS — H40.89 OTHER GLAUCOMA OF RIGHT EYE: ICD-10-CM

## 2024-11-25 DIAGNOSIS — G93.89 CEREBRAL VENTRICULOMEGALY: ICD-10-CM

## 2024-11-25 DIAGNOSIS — I10 HYPERTENSION, UNSPECIFIED TYPE: ICD-10-CM

## 2024-11-25 DIAGNOSIS — R26.89 BALANCE PROBLEMS: ICD-10-CM

## 2024-11-25 DIAGNOSIS — H54.61 VISION LOSS, RIGHT EYE: Primary | ICD-10-CM

## 2024-11-25 PROCEDURE — 99204 OFFICE O/P NEW MOD 45 MIN: CPT | Performed by: INTERNAL MEDICINE

## 2024-11-25 RX ORDER — HYDRALAZINE HYDROCHLORIDE 10 MG/1
10 TABLET, FILM COATED ORAL EVERY 4 HOURS PRN
Qty: 90 TABLET | Refills: 0 | Status: SHIPPED | OUTPATIENT
Start: 2024-11-25

## 2024-11-25 RX ORDER — POLYMYXIN B SULFATE AND TRIMETHOPRIM 1; 10000 MG/ML; [USP'U]/ML
1-2 SOLUTION OPHTHALMIC EVERY 4 HOURS
Qty: 10 ML | Refills: 0 | Status: SHIPPED | OUTPATIENT
Start: 2024-11-25

## 2024-11-25 RX ORDER — LOSARTAN POTASSIUM 25 MG/1
12.5 TABLET ORAL DAILY
Qty: 45 TABLET | Refills: 3 | Status: SHIPPED | OUTPATIENT
Start: 2024-11-25

## 2024-11-25 RX ORDER — BRIMONIDINE TARTRATE 2 MG/ML
1 SOLUTION/ DROPS OPHTHALMIC 3 TIMES DAILY
Qty: 15 ML | Refills: 1 | Status: SHIPPED | OUTPATIENT
Start: 2024-11-25

## 2024-11-25 NOTE — PROGRESS NOTES
Assessment & Plan     Vision loss, right eye  Thought due to subacute angle closer glaucome, Follow up with Ophthalmology   - Home Care Referral    Other glaucoma of right eye     - brimonidine (ALPHAGAN) 0.2 % ophthalmic solution; Place 1 drop into both eyes 3 times daily.  - polymixin b-trimethoprim (POLYTRIM) 64198-5.1 UNIT/ML-% ophthalmic solution; Place 1-2 drops into the right eye every 4 hours.  - Home Care Referral    Balance problems     - Home Care Referral    Hypertension, unspecified type  Borderline. Continue current medications. With hydralazine as rescue if bp too high  - hydrALAZINE (APRESOLINE) 10 MG tablet; Take 1 tablet (10 mg) by mouth every 4 hours as needed for high blood pressure (for systolic BP greater than 180 mmHg).  - losartan (COZAAR) 25 MG tablet; Take 0.5 tablets (12.5 mg) by mouth daily.  - Home Care Referral    Cerebral ventriculomegaly  Some concern regarding NPH with balance worsening and intermittent confusion        MED REC REQUIRED  Post Medication Reconciliation Status: discharge medications reconciled, continue medications without change  Nicotine/Tobacco Cessation  She reports that she has been smoking. She has never used smokeless tobacco.  Nicotine/Tobacco Cessation Plan  She understands she cannot smoke says she can stop on her own.             Aakash Orantes is a 74 year old, presenting for the following health issues:  Hospital F/U      11/25/2024     1:39 PM   Additional Questions   Roomed by JOSE FRANCISCO Martinez LPN   Accompanied by G-daughter and g-son-in-law.         11/25/2024     1:39 PM   Patient Reported Additional Medications   Patient reports taking the following new medications none     HPI     She had a complicated hospital course with MRI showing old strokes possible NPH with worsening balance and intermittent confusion. She has significant loss of vision in right eye and not expected to regain much vision. She is living with her daughter. Balance and  "ability to make good judgements are both limited. Her hypertension has been borderline she has hydralazine as a rescvue med which she is using once a day.       Hospital Follow-up Visit:    Hospital/Nursing Home/IP Rehab Facility: [facility]  Date of Admission: 10-  Date of Discharge: 10- to TCU and home 11-  Reason(s) for Admission: vision issues  Was the patient in the ICU or did the patient experience delirium during hospitalization?  Yes          No data to display              Do you have any other stressors you would like to discuss with your provider? No    Problems taking medications regularly:  None  Medication changes since discharge: None  Problems adhering to non-medication therapy:  None    Summary of hospitalization:  [facility] discharge summary reviewed  Diagnostic Tests/Treatments reviewed.  Follow up needed: with Ophthalmology and Neurology   Other Healthcare Providers Involved in Patient s Care:         Homecare  Update since discharge: stable.         Plan of care communicated with patient and daughter and granddaughter               Review of Systems  Constitutional, HEENT, cardiovascular, pulmonary, GI, , musculoskeletal, neuro, skin, endocrine and psych systems are negative, except as otherwise noted.      Objective    BP (!) 138/98   Pulse 76   Temp 98  F (36.7  C) (Oral)   Resp 20   Ht 1.702 m (5' 7\")   Wt 71.7 kg (158 lb)   SpO2 100%   Breastfeeding No   BMI 24.75 kg/m    Body mass index is 24.75 kg/m .  Physical Exam   GENERAL: alert and no distress  NECK: no adenopathy, no asymmetry, masses, or scars  RESP: lungs clear to auscultation - no rales, rhonchi or wheezes  CV: regular rate and rhythm, normal S1 S2, no S3 or S4, no murmur, click or rub, no peripheral edema  ABDOMEN: soft, nontender, no hepatosplenomegaly, no masses and bowel sounds normal  MS: no gross musculoskeletal defects noted, no " edema  NEURO: gait unsteady          Signed Electronically by: Ira Leong MD

## 2024-11-25 NOTE — TELEPHONE ENCOUNTER
Please be aware [facility] has declined due to Insurance.    I am attempting to outsource.    Thank you,  Ira Hamlin LPN  [facility]

## 2024-11-26 ENCOUNTER — TELEPHONE (OUTPATIENT)
Dept: INTERNAL MEDICINE | Facility: CLINIC | Age: 74
End: 2024-11-26
Payer: COMMERCIAL

## 2024-11-26 NOTE — TELEPHONE ENCOUNTER
Please be aware [facility] has accepted this patient for home care services.    Thank you,  Ira Hamlin LPN  [facility]

## 2024-11-27 ENCOUNTER — OFFICE VISIT (OUTPATIENT)
Dept: INTERNAL MEDICINE | Facility: CLINIC | Age: 74
End: 2024-11-27
Payer: COMMERCIAL

## 2024-11-27 VITALS
HEIGHT: 67 IN | WEIGHT: 159 LBS | TEMPERATURE: 97.4 F | RESPIRATION RATE: 20 BRPM | HEART RATE: 88 BPM | BODY MASS INDEX: 24.96 KG/M2 | SYSTOLIC BLOOD PRESSURE: 139 MMHG | OXYGEN SATURATION: 99 % | DIASTOLIC BLOOD PRESSURE: 91 MMHG

## 2024-11-27 DIAGNOSIS — M79.10 MUSCLE PAIN: ICD-10-CM

## 2024-11-27 DIAGNOSIS — H40.9 GLAUCOMA OF RIGHT EYE, UNSPECIFIED GLAUCOMA TYPE: ICD-10-CM

## 2024-11-27 DIAGNOSIS — Z01.818 PREOP GENERAL PHYSICAL EXAM: Primary | ICD-10-CM

## 2024-11-27 DIAGNOSIS — I10 HYPERTENSION, UNSPECIFIED TYPE: ICD-10-CM

## 2024-11-27 DIAGNOSIS — H25.9 SENILE CATARACT OF LEFT EYE, UNSPECIFIED AGE-RELATED CATARACT TYPE: ICD-10-CM

## 2024-11-27 LAB
ALBUMIN SERPL BCG-MCNC: 4 G/DL (ref 3.5–5.2)
ALP SERPL-CCNC: 104 U/L (ref 40–150)
ALT SERPL W P-5'-P-CCNC: 14 U/L (ref 0–50)
ANION GAP SERPL CALCULATED.3IONS-SCNC: 9 MMOL/L (ref 7–15)
AST SERPL W P-5'-P-CCNC: 20 U/L (ref 0–45)
BILIRUB SERPL-MCNC: 0.2 MG/DL
BUN SERPL-MCNC: 12.8 MG/DL (ref 8–23)
CALCIUM SERPL-MCNC: 9.2 MG/DL (ref 8.8–10.4)
CHLORIDE SERPL-SCNC: 108 MMOL/L (ref 98–107)
CREAT SERPL-MCNC: 0.95 MG/DL (ref 0.51–0.95)
EGFRCR SERPLBLD CKD-EPI 2021: 62 ML/MIN/1.73M2
GLUCOSE SERPL-MCNC: 74 MG/DL (ref 70–99)
HCO3 SERPL-SCNC: 26 MMOL/L (ref 22–29)
POTASSIUM SERPL-SCNC: 4.4 MMOL/L (ref 3.4–5.3)
PROT SERPL-MCNC: 6.8 G/DL (ref 6.4–8.3)
SODIUM SERPL-SCNC: 143 MMOL/L (ref 135–145)

## 2024-11-27 PROCEDURE — 80053 COMPREHEN METABOLIC PANEL: CPT | Performed by: INTERNAL MEDICINE

## 2024-11-27 PROCEDURE — 99214 OFFICE O/P EST MOD 30 MIN: CPT | Performed by: INTERNAL MEDICINE

## 2024-11-27 PROCEDURE — 36415 COLL VENOUS BLD VENIPUNCTURE: CPT | Performed by: INTERNAL MEDICINE

## 2024-11-27 NOTE — PROGRESS NOTES
Preoperative Evaluation  [place] [address]  [address]  [facility] [address]  Phone: [phone]  Primary Provider: Ira Leong MD  Pre-op Performing Provider: Ira Leong MD  Nov 27, 2024 11/27/2024   Surgical Information   What procedure is being done? Cataract left eye   Facility or Hospital where procedure/surgery will be performed: [facility]   Who is doing the procedure / surgery? dr mendoza    Date of surgery / procedure: decem ber 11    Time of surgery / procedure: am    Where do you plan to recover after surgery? at home with family        Patient-reported     Fax number for surgical facility: [phone]    Assessment & Plan     The proposed surgical procedure is considered LOW risk.    Preop general physical exam  Ready for surgery     Senile cataract of left eye, unspecified age-related cataract type       Hypertension, unspecified type  under good control     Glaucoma of right eye, unspecified glaucoma type               - No identified additional risk factors other than previously addressed         Recommendation  Approval given to proceed with proposed procedure, without further diagnostic evaluation.    Aakash Orantes is a 74 year old, presenting for the following:  Pre-Op Exam          11/27/2024     9:16 AM   Additional Questions   Roomed by JOSE FRANCISCO Martinez LPN   Accompanied by self         11/27/2024   Forms   Any forms needing to be completed Yes          11/27/2024     9:16 AM   Patient Reported Additional Medications   Patient reports taking the following new medications none     HPI related to upcoming procedure: glaucoma in right eye. With sever loss in vision. Left eye with cataract and going in for extraction.         11/27/2024   Pre-Op Questionnaire   Have you ever had a heart attack or stroke? (!) YES by MRI    Have you ever had surgery on your heart or blood vessels, such as a stent placement, a coronary artery  bypass, or surgery on an artery in your head, neck, heart, or legs? No    Do you have chest pain with activity? No    Do you have a history of heart failure? No    Do you currently have a cold, bronchitis or symptoms of other infection? No    Do you have a cough, shortness of breath, or wheezing? No    Do you or anyone in your family have previous history of blood clots? No    Do you or does anyone in your family have a serious bleeding problem such as prolonged bleeding following surgeries or cuts? No    Have you ever had problems with anemia or been told to take iron pills? No    Have you had any abnormal blood loss such as black, tarry or bloody stools, or abnormal vaginal bleeding? No    Have you ever had a blood transfusion? No    Are you willing to have a blood transfusion if it is medically needed before, during, or after your surgery? Yes    Have you or any of your relatives ever had problems with anesthesia? No    Do you have sleep apnea, excessive snoring or daytime drowsiness? No    Do you have any artifical heart valves or other implanted medical devices like a pacemaker, defibrillator, or continuous glucose monitor? No    Do you have artificial joints? No    Are you allergic to latex? No        Patient-reported     Health Care Directive  Patient does not have a Health Care Directive: Discussed advance care planning with patient; however, patient declined at this time.    Preoperative Review of    reviewed - no record of controlled substances prescribed.      Status of Chronic Conditions:  See problem list for active medical problems.  Problems all longstanding and stable, except as noted/documented.  See ROS for pertinent symptoms related to these conditions.    Patient Active Problem List    Diagnosis Date Noted    Delirium 10/18/2024     Priority: Medium    Redness of right eye 10/18/2024     Priority: Medium    Hypertension, unspecified type 10/18/2024     Priority: Medium    Urinary tract  infection with hematuria, site unspecified 01/18/2022     Priority: Medium    Transaminitis 01/17/2022     Priority: Medium    Generalized muscle weakness 01/17/2022     Priority: Medium    DOLORES (acute kidney injury) (H) 01/17/2022     Priority: Medium    Fall, initial encounter 01/17/2022     Priority: Medium    Infection due to 2019 novel coronavirus 01/17/2022     Priority: Medium      Past Medical History:   Diagnosis Date    Hypertension      Past Surgical History:   Procedure Laterality Date    COLONOSCOPY N/A 8/30/2021    Procedure: Colonoscopy, With Polypectomy And Biopsy;  Surgeon: Federico Vital MD;  Location:  GI     Current Outpatient Medications   Medication Sig Dispense Refill    aspirin (ASA) 81 MG chewable tablet Take 1 tablet (81 mg) by mouth daily.      atorvastatin (LIPITOR) 40 MG tablet Take 1 tablet (40 mg) by mouth every evening.      brimonidine (ALPHAGAN) 0.2 % ophthalmic solution Place 1 drop into both eyes 3 times daily. 15 mL 1    calcium carbonate (TUMS) 500 MG chewable tablet Take 1 tablet (500 mg) by mouth 4 times daily as needed for heartburn.      clopidogrel (PLAVIX) 75 MG tablet Take 1 tablet (75 mg) by mouth daily for 16 days.      dorzolamide-timolol (COSOPT) 2-0.5 % ophthalmic solution Place 1 drop into both eyes 2 times daily.      fluorometholone (FML LIQUIFILM) 0.1 % ophthalmic suspension Place 1 drop into the right eye 2 times daily.      hydrALAZINE (APRESOLINE) 10 MG tablet Take 1 tablet (10 mg) by mouth every 4 hours as needed for high blood pressure (for systolic BP greater than 180 mmHg). 90 tablet 0    ibuprofen (ADVIL/MOTRIN) 200 MG capsule Take 400 mg by mouth every 6 hours as needed for moderate pain or mild pain.      latanoprost (XALATAN) 0.005 % ophthalmic solution Place 1 drop into both eyes at bedtime.      lidocaine (LMX4) 4 % external cream Apply topically every hour as needed for pain (with VAD insertion). Please dispense available cream if other options  "are avaialble. 5 g 0    lidocaine 1 % SOLN 0.1-1 mLs by Other route every hour as needed (mild pain with VAD insertion). 30 mL 0    losartan (COZAAR) 25 MG tablet Take 0.5 tablets (12.5 mg) by mouth daily. 45 tablet 3    ondansetron (ZOFRAN ODT) 4 MG ODT tab Take 1 tablet (4 mg) by mouth every 6 hours as needed for nausea or vomiting.      ondansetron (ZOFRAN) 2 MG/ML injection Inject 2 mLs (4 mg) over 2-5 minutes into the vein every 6 hours as needed for nausea or vomiting. Ok to dispense other volumes 30 mL 0    polymixin b-trimethoprim (POLYTRIM) 21458-8.1 UNIT/ML-% ophthalmic solution Place 1-2 drops into the right eye every 4 hours. 10 mL 0    senna-docusate (SENOKOT-S/PERICOLACE) 8.6-50 MG tablet Take 1 tablet by mouth 2 times daily as needed for constipation.      senna-docusate (SENOKOT-S/PERICOLACE) 8.6-50 MG tablet Take 2 tablets by mouth 2 times daily as needed for constipation.      sodium chloride, PF, 0.9% PF flush Inject 3 mLs into catheter every 8 hours.      sodium chloride, PF, 0.9% PF flush Inject 3 mLs into catheter every 10 minutes.         Allergies   Allergen Reactions    Penicillins      Swelling of arms and legs        Social History     Tobacco Use    Smoking status: Every Day    Smokeless tobacco: Never    Tobacco comments:     starting smoking at age 26   Substance Use Topics    Alcohol use: Yes     Family History   Problem Relation Age of Onset    Glaucoma Mother     Diabetes Mother         Type II    Thyroid Cancer Mother     Glaucoma Sister     Colon Cancer No family hx of      History   Drug Use Unknown             Review of Systems  Constitutional, HEENT, cardiovascular, pulmonary, GI, , musculoskeletal, neuro, skin, endocrine and psych systems are negative, except as otherwise noted.    Objective    LMP  (LMP Unknown)    Estimated body mass index is 24.75 kg/m  as calculated from the following:    Height as of 11/25/24: 1.702 m (5' 7\").    Weight as of 11/25/24: 71.7 kg (158 " lb).  Physical Exam  GENERAL: alert and no distress  EYES: Eyes grossly normal to inspection, PERRL and conjunctivae and sclerae normal  HENT: ear canals and TM's normal, nose and mouth without ulcers or lesions  NECK: no adenopathy, no asymmetry, masses, or scars  RESP: lungs clear to auscultation - no rales, rhonchi or wheezes  CV: regular rate and rhythm, normal S1 S2, no S3 or S4, no murmur, click or rub, no peripheral edema  ABDOMEN: soft, nontender, no hepatosplenomegaly, no masses and bowel sounds normal  MS: no gross musculoskeletal defects noted, no edema  SKIN: no suspicious lesions or rashes  NEURO: Normal strength and tone, mentation intact and speech normal  PSYCH: mentation appears normal, affect normal/bright    Recent Labs   Lab Test 10/21/24  0528 10/19/24  0547 10/18/24  1143   HGB  --  14.3 15.2   PLT  --  213 206   INR 1.02  --   --    NA  --  138 139   POTASSIUM  --  3.7 4.0   CR  --  0.93 0.84   A1C 6.0*  --   --         Diagnostics  Labs pending at this time.  Results will be reviewed when available.   No EKG required for low risk surgery (cataract, skin procedure, breast biopsy, etc).    Revised Cardiac Risk Index (RCRI)  The patient has the following serious cardiovascular risks for perioperative complications:   - No serious cardiac risks = 0 points     RCRI Interpretation: 0 points: Class I (very low risk - 0.4% complication rate)         Signed Electronically by: Ira Leong MD  A copy of this evaluation report is provided to the requesting physician.

## 2024-11-29 ENCOUNTER — MEDICAL CORRESPONDENCE (OUTPATIENT)
Dept: HEALTH INFORMATION MANAGEMENT | Facility: CLINIC | Age: 74
End: 2024-11-29

## 2024-12-03 ENCOUNTER — TELEPHONE (OUTPATIENT)
Dept: INTERNAL MEDICINE | Facility: CLINIC | Age: 74
End: 2024-12-03
Payer: COMMERCIAL

## 2024-12-03 NOTE — TELEPHONE ENCOUNTER
Home Care is calling regarding an established patient with [facility].       Requesting orders from: Ira Leong  Provider is following patient: Yes  Is this a 60-day recertification request?  No    Orders Requested    Skilled Nursing  Request for initial evaluation and treatment (one time)       Verbal orders given.  Home Care will send orders for provider to sign.    Yesica Buckner RN

## 2024-12-03 NOTE — TELEPHONE ENCOUNTER
[facility] * missed physical therapy received via fax     Forms in  mail box for review and signature.

## 2024-12-10 ENCOUNTER — TELEPHONE (OUTPATIENT)
Dept: INTERNAL MEDICINE | Facility: CLINIC | Age: 74
End: 2024-12-10
Payer: COMMERCIAL

## 2024-12-17 ENCOUNTER — MEDICAL CORRESPONDENCE (OUTPATIENT)
Dept: HEALTH INFORMATION MANAGEMENT | Facility: CLINIC | Age: 74
End: 2024-12-17

## 2024-12-20 ENCOUNTER — ANESTHESIA EVENT (OUTPATIENT)
Dept: SURGERY | Facility: AMBULATORY SURGERY CENTER | Age: 74
End: 2024-12-20
Payer: COMMERCIAL

## 2024-12-23 ENCOUNTER — HOSPITAL ENCOUNTER (OUTPATIENT)
Facility: AMBULATORY SURGERY CENTER | Age: 74
End: 2024-12-23
Attending: OPHTHALMOLOGY
Payer: COMMERCIAL

## 2024-12-23 ENCOUNTER — ANESTHESIA (OUTPATIENT)
Dept: SURGERY | Facility: AMBULATORY SURGERY CENTER | Age: 74
End: 2024-12-23
Payer: COMMERCIAL

## 2024-12-23 VITALS
BODY MASS INDEX: 24.8 KG/M2 | HEART RATE: 60 BPM | TEMPERATURE: 97.2 F | DIASTOLIC BLOOD PRESSURE: 99 MMHG | OXYGEN SATURATION: 99 % | SYSTOLIC BLOOD PRESSURE: 125 MMHG | HEIGHT: 67 IN | WEIGHT: 158 LBS | RESPIRATION RATE: 16 BRPM

## 2024-12-23 DIAGNOSIS — H25.812 COMBINED FORM OF AGE-RELATED CATARACT, LEFT EYE: Primary | ICD-10-CM

## 2024-12-23 PROCEDURE — 66982 XCAPSL CTRC RMVL CPLX WO ECP: CPT | Mod: LT

## 2024-12-23 PROCEDURE — 66982 XCAPSL CTRC RMVL CPLX WO ECP: CPT | Mod: LT | Performed by: OPHTHALMOLOGY

## 2024-12-23 DEVICE — IMPLANTABLE DEVICE: Type: IMPLANTABLE DEVICE | Site: EYE | Status: FUNCTIONAL

## 2024-12-23 DEVICE — EYE RING MORCHER TENSION PRELOAD CC LT 13.0MM TYPE 14C LEFT: Type: IMPLANTABLE DEVICE | Site: EYE | Status: FUNCTIONAL

## 2024-12-23 RX ORDER — DEXAMETHASONE SODIUM PHOSPHATE 4 MG/ML
INJECTION, SOLUTION INTRA-ARTICULAR; INTRALESIONAL; INTRAMUSCULAR; INTRAVENOUS; SOFT TISSUE PRN
Status: DISCONTINUED | OUTPATIENT
Start: 2024-12-23 | End: 2024-12-23 | Stop reason: HOSPADM

## 2024-12-23 RX ORDER — KETOROLAC TROMETHAMINE 4 MG/ML
1 SOLUTION/ DROPS OPHTHALMIC 4 TIMES DAILY
Qty: 5 ML | Refills: 1 | Status: SHIPPED | OUTPATIENT
Start: 2024-12-23

## 2024-12-23 RX ORDER — LIDOCAINE HYDROCHLORIDE 10 MG/ML
INJECTION, SOLUTION EPIDURAL; INFILTRATION; INTRACAUDAL; PERINEURAL PRN
Status: DISCONTINUED | OUTPATIENT
Start: 2024-12-23 | End: 2024-12-23 | Stop reason: HOSPADM

## 2024-12-23 RX ORDER — NALOXONE HYDROCHLORIDE 0.4 MG/ML
0.1 INJECTION, SOLUTION INTRAMUSCULAR; INTRAVENOUS; SUBCUTANEOUS
Status: ACTIVE | OUTPATIENT
Start: 2024-12-23

## 2024-12-23 RX ORDER — FENTANYL CITRATE 50 UG/ML
INJECTION, SOLUTION INTRAMUSCULAR; INTRAVENOUS PRN
Status: DISCONTINUED | OUTPATIENT
Start: 2024-12-23 | End: 2024-12-23

## 2024-12-23 RX ORDER — LIDOCAINE 40 MG/G
CREAM TOPICAL
Status: DISCONTINUED | OUTPATIENT
Start: 2024-12-23 | End: 2024-12-23 | Stop reason: HOSPADM

## 2024-12-23 RX ORDER — ONDANSETRON 2 MG/ML
INJECTION INTRAMUSCULAR; INTRAVENOUS PRN
Status: DISCONTINUED | OUTPATIENT
Start: 2024-12-23 | End: 2024-12-23

## 2024-12-23 RX ORDER — PROPARACAINE HYDROCHLORIDE 5 MG/ML
1 SOLUTION/ DROPS OPHTHALMIC ONCE
Status: COMPLETED | OUTPATIENT
Start: 2024-12-23 | End: 2024-12-23

## 2024-12-23 RX ORDER — TETRACAINE HYDROCHLORIDE 5 MG/ML
SOLUTION OPHTHALMIC PRN
Status: DISCONTINUED | OUTPATIENT
Start: 2024-12-23 | End: 2024-12-23 | Stop reason: HOSPADM

## 2024-12-23 RX ORDER — MOXIFLOXACIN 5 MG/ML
1 SOLUTION/ DROPS OPHTHALMIC 3 TIMES DAILY
Qty: 3 ML | Refills: 1 | Status: SHIPPED | OUTPATIENT
Start: 2024-12-23

## 2024-12-23 RX ORDER — OXYCODONE HYDROCHLORIDE 5 MG/1
5 TABLET ORAL
Status: ACTIVE | OUTPATIENT
Start: 2024-12-23

## 2024-12-23 RX ORDER — MOXIFLOXACIN IN NACL,ISO-OS/PF 0.3MG/0.3
SYRINGE (ML) INTRAOCULAR PRN
Status: DISCONTINUED | OUTPATIENT
Start: 2024-12-23 | End: 2024-12-23 | Stop reason: HOSPADM

## 2024-12-23 RX ORDER — ONDANSETRON 4 MG/1
4 TABLET, ORALLY DISINTEGRATING ORAL EVERY 30 MIN PRN
Status: ACTIVE | OUTPATIENT
Start: 2024-12-23

## 2024-12-23 RX ORDER — BALANCED SALT SOLUTION 6.4; .75; .48; .3; 3.9; 1.7 MG/ML; MG/ML; MG/ML; MG/ML; MG/ML; MG/ML
SOLUTION OPHTHALMIC PRN
Status: DISCONTINUED | OUTPATIENT
Start: 2024-12-23 | End: 2024-12-23 | Stop reason: HOSPADM

## 2024-12-23 RX ORDER — ONDANSETRON 2 MG/ML
4 INJECTION INTRAMUSCULAR; INTRAVENOUS EVERY 30 MIN PRN
Status: ACTIVE | OUTPATIENT
Start: 2024-12-23

## 2024-12-23 RX ORDER — CYCLOPENTOLAT/TROPIC/PHENYLEPH 1%-1%-2.5%
1 DROPS (EA) OPHTHALMIC (EYE)
Status: COMPLETED | OUTPATIENT
Start: 2024-12-23 | End: 2024-12-23

## 2024-12-23 RX ORDER — OXYCODONE HYDROCHLORIDE 5 MG/1
10 TABLET ORAL
Status: ACTIVE | OUTPATIENT
Start: 2024-12-23

## 2024-12-23 RX ORDER — FENTANYL CITRATE 50 UG/ML
25 INJECTION, SOLUTION INTRAMUSCULAR; INTRAVENOUS
Status: ACTIVE | OUTPATIENT
Start: 2024-12-23

## 2024-12-23 RX ORDER — POVIDONE-IODINE 5 %
1 SOLUTION, NON-ORAL OPHTHALMIC (EYE) ONCE
Status: DISCONTINUED | OUTPATIENT
Start: 2024-12-23 | End: 2024-12-23 | Stop reason: HOSPADM

## 2024-12-23 RX ORDER — DEXAMETHASONE SODIUM PHOSPHATE 10 MG/ML
4 INJECTION, SOLUTION INTRAMUSCULAR; INTRAVENOUS
Status: ACTIVE | OUTPATIENT
Start: 2024-12-23

## 2024-12-23 RX ORDER — PREDNISOLONE ACETATE 10 MG/ML
1 SUSPENSION/ DROPS OPHTHALMIC 4 TIMES DAILY
Qty: 10 ML | Refills: 1 | Status: SHIPPED | OUTPATIENT
Start: 2024-12-23

## 2024-12-23 RX ORDER — ACETAMINOPHEN 325 MG/1
975 TABLET ORAL ONCE
Status: COMPLETED | OUTPATIENT
Start: 2024-12-23 | End: 2024-12-23

## 2024-12-23 RX ADMIN — ACETAMINOPHEN 975 MG: 325 TABLET ORAL at 12:43

## 2024-12-23 RX ADMIN — Medication 1 DROP: at 12:38

## 2024-12-23 RX ADMIN — FENTANYL CITRATE 50 MCG: 50 INJECTION, SOLUTION INTRAMUSCULAR; INTRAVENOUS at 14:02

## 2024-12-23 RX ADMIN — ONDANSETRON 4 MG: 2 INJECTION INTRAMUSCULAR; INTRAVENOUS at 14:06

## 2024-12-23 RX ADMIN — Medication 1 DROP: at 12:33

## 2024-12-23 RX ADMIN — Medication 1 DROP: at 12:43

## 2024-12-23 RX ADMIN — PROPARACAINE HYDROCHLORIDE 1 DROP: 5 SOLUTION/ DROPS OPHTHALMIC at 12:33

## 2024-12-23 NOTE — ANESTHESIA POSTPROCEDURE EVALUATION
Patient: Rolanda Molina    Procedure: Procedure(s):  LEFT EYE COMPLEX PHACOEMULSIFICATION, CATARACT, WITH STANDARD INTRAOCULAR LENS IMPLANT INSERTION       Anesthesia Type:  MAC    Note:  Disposition: Outpatient   Postop Pain Control: Uneventful            Sign Out: Well controlled pain   PONV: No   Neuro/Psych: Uneventful            Sign Out: Acceptable/Baseline neuro status   Airway/Respiratory: Uneventful            Sign Out: Acceptable/Baseline resp. status   CV/Hemodynamics: Uneventful            Sign Out: Acceptable CV status; No obvious hypovolemia; No obvious fluid overload   Other NRE: NONE   DID A NON-ROUTINE EVENT OCCUR?            Last vitals:  Vitals Value Taken Time   /103 12/23/24 1526   Temp 36.2  C (97.2  F) 12/23/24 1500   Pulse 65 12/23/24 1526   Resp 16 12/23/24 1500   SpO2 99 % 12/23/24 1514   Vitals shown include unfiled device data.    Electronically Signed By: Barron Voss MD  December 23, 2024  4:03 PM

## 2024-12-23 NOTE — OR NURSING
Intraocular lens confirmed with the surgeon against the paper biometry prior to patient entering the operating room.

## 2024-12-23 NOTE — DISCHARGE INSTRUCTIONS
[facility]  Home Care Following Anesthesia  For 24 hours after surgery:  Get plenty of rest.  A responsible adult must stay with you for at least 24 hours after you leave the surgery center.  Do not drive or use heavy equipment.  If you have weakness or tingling, don't drive or use heavy equipment until this feeling goes away.   Do not drink alcohol.   Avoid strenuous or risky activities.  Ask for help when climbing stairs.  You may feel lightheaded.  IF so, sit for a few minutes before standing.  Have someone help you get up.   If you have nausea (feel sick to your stomach): Drink only clear liquids such as apple juice, ginger ale, broth or 7-Up.  Rest may also help.  Be sure to drink enough fluids.  Move to a regular diet as you feel able.   You may have a slight fever.  Call the doctor if your fever is over 100 F (37.7 C) (taken under the tongue) or lasts longer than 24 hours.  You may have a dry mouth, a sore throat, muscle aches or trouble sleeping. These should go away after 24 hours.  Do not make important or legal decisions.   It is recommended to avoid smoking.               Tips for taking pain medications  To get the best pain relief possible, remember these points:  Take pain medications as directed, before pain becomes severe.  Pain medication can upset your stomach: taking it with food may help.  Constipation is a common side effect of pain medication. Drink plenty of  fluids.  Eat foods high in fiber. Take a stool softener if recommended by your doctor or pharmacist.  Do not drink alcohol, drive or operate machinery while taking pain medications.  Ask about other ways to control pain, such as with heat, ice or relaxation.    Tylenol/Acetaminophen Consumption    If you feel your pain relief is insufficient, you may take Tylenol/Acetaminophen in addition to your narcotic pain medication.   Be careful not to exceed 4,000 mg of Tylenol/Acetaminophen in a 24 hour  period from all sources.  If you are taking extra strength Tylenol/acetaminophen (500 mg), the maximum dose is 8 tablets in 24 hours.  If you are taking regular strength acetaminophen (325 mg), the maximum dose is 12 tablets in 24 hours.  Tylenol 975 mg given at 12:43pm.   Ok to take more after 6:43pm.      Call a doctor for any of the following:  Signs of infection (fever, growing tenderness at the surgery site, a large amount of drainage or bleeding, severe pain, foul-smelling drainage, redness, swelling).  It has been over 8 to 10 hours since surgery and you are still not able to urinate (pass water).  Headache for over 24 hours.  Numbness, tingling or weakness the day after surgery (if you had spinal anesthesia).  Signs of Covid-19 infection (temperature over 100 degrees, shortness of breath, cough, loss of taste/smell, generalized body aches, persistent headache, chills, sore throat, nausea/vomiting/diarrhea)    Your doctor is:       Dr. Ashu Grey, Ophthalmology: [phone]               After hours and weekends call the hospital @ [phone] and ask for the resident on call for:  Ophthalmology  For emergency care, call the:  [facility]:  [phone] (TTY for hearing impaired: [phone])

## 2024-12-23 NOTE — OP NOTE
PREOPERATIVE DIAGNOSIS:   1. Combined form of age-related cataract, left eye      POSTOPERATIVE DIAGNOSIS: Same   PROCEDURES:   1. Complex cataract extraction with intraocular lens implant Left eye.  SURGEON: Ashu Grey M.D.  Assistant: Bry Landrum M.D.      INDICATIONS: The patient Rolanda Molina presented to the eye clinic with decreased vision secondary to cataract in the Left eye. The risks, benefits and alternatives to cataract extraction were discussed. The patient elected to proceed. All questions were answered to the patient's satisfaction.   DESCRIPTION OF PROCEDURE:   Prior to the procedure, appropriate cardiac and respiratory monitors were applied to the patient.  In the pre-operative holding area, a drop of topical tetracaine followed by lidocaine gel followed by povidone iodine.  The patient was brought to the operating room where a surgical pause was carried out to identify with all members of the surgical team the correct surgical site.  With adequate anesthesia, the Left eye was prepped and draped in the usual sterile fashion. A lid speculum was placed, and the operating microscope was rotated into position. A paracentesis was created.  Trypan blue stain was injected under air to stain the anterior lens capsule.  Trypan blue stain was elected due to a poor red reflex in the setting of dense cataract. The anterior chamber was then filled with preservative-free lidocaine followed by viscoelastic.  A temporal wound was created at the limbus using a 2.6 mm blade. A capsulorrhexis was initiated using a bent 25-gauge needle and was completed in continuous and circular fashion using the capsulorrhexis forceps. Diffuse zonulopathy was noted during capsulotomy creation.  The lens nucleus was hydrodissected using balanced salt solution.  The lens nucleus was rotated and removed using phacoemulsification in a stop and chop technique.  Residual cortical material was removed using  irrigation-aspiration.  The capsular bag was reinflated to its maximal extent with cohesive viscoelastic.  A Morcher type (14C) capsular tension ring was placed into the capsular bag.  A 25.5 diopter MA60AC was inserted into the capsular bag.  The lens power selected was reviewed using the intraocular lens power measurements that were obtained preoperatively to confirm that the correct lens was selected for the desired post-operative refractive state. The residual viscoelastic was removed in its entirety, the wound were hydrated and found to be self-sealing.  Intracameral moxifloxacin was administered. Tactile pressure was confirmed to be in a normal range.  Subconjunctival Dexamethasone (2 mg) was injected. The lid speculum was removed and a patch and shield were applied.  The patient tolerated the procedure well.     PLAN: The patient will be discharged to home and will follow up tomorrow morning in the eye clinic.  EBL:  None  Findings: Diffuse zonulopathy  Complications:  None  Implant Name Type Inv. Item Serial No.  Lot No. LRB No. Used Action   EYE RING MORCHER TENSION PRELOAD CC LT 13.0MM TYPE 14C LEFT - [id number] Lens/Eye Implant EYE RING MORCHER TENSION PRELOAD CC LT 13.0MM TYPE 14C LEFT  care home [employer] CDFDAC Left 1 Implanted   EYE IMP IOL JAMA PCL MA60AC 25.5 - [id number] Lens/Eye Implant EYE IMP IOL JAMA PCL MA60AC 25.5 [id number] [employer]  Left 1 Implanted          Attending Physician Procedure Attestation: I was present for the entire procedure       Ashu Grey MD  , Comprehensive Ophthalmology  Department of Ophthalmology and Visual Neurosciences  [facility]     1.69

## 2024-12-23 NOTE — ANESTHESIA CARE TRANSFER NOTE
Patient: Rolanda Molina    Procedure: Procedure(s):  LEFT EYE COMPLEX PHACOEMULSIFICATION, CATARACT, WITH STANDARD INTRAOCULAR LENS IMPLANT INSERTION       Diagnosis: Combined form of age-related cataract, left eye [H25.812]  Anatomical narrow angle borderline glaucoma of both eyes [H40.033]  Diagnosis Additional Information: No value filed.    Anesthesia Type:   MAC     Note:    Oropharynx: oropharynx clear of all foreign objects  Level of Consciousness: awake  Oxygen Supplementation: room air    Independent Airway: airway patency satisfactory and stable  Dentition: dentition unchanged  Vital Signs Stable: post-procedure vital signs reviewed and stable  Report to RN Given: handoff report given  Patient transferred to: Phase II    Handoff Report: Identifed the Patient, Identified the Reponsible Provider, Reviewed the pertinent medical history, Discussed the surgical course, Reviewed Intra-OP anesthesia mangement and issues during anesthesia, Set expectations for post-procedure period and Allowed opportunity for questions and acknowledgement of understanding      Vitals:  Vitals Value Taken Time   BP     Temp     Pulse     Resp     SpO2         Electronically Signed By: FAYE Foley CRNA  December 23, 2024  2:35 PM

## 2024-12-23 NOTE — ANESTHESIA PREPROCEDURE EVALUATION
Anesthesia Pre-Procedure Evaluation    Patient: Rolanda Molina   MRN: [id number] : 1950        Procedure : Procedure(s):  LEFT EYE COMPLEX PHACOEMULSIFICATION, CATARACT, WITH STANDARD INTRAOCULAR LENS IMPLANT INSERTION          Past Medical History:   Diagnosis Date    Hypertension       Past Surgical History:   Procedure Laterality Date    COLONOSCOPY N/A 2021    Procedure: Colonoscopy, With Polypectomy And Biopsy;  Surgeon: Federico Vital MD;  Location: [facility]      Allergies   Allergen Reactions    Penicillins      Swelling of arms and legs      Social History     Tobacco Use    Smoking status: Every Day    Smokeless tobacco: Never    Tobacco comments:     starting smoking at age 26   Substance Use Topics    Alcohol use: Yes      Wt Readings from Last 1 Encounters:   24 71.7 kg (158 lb)        Anesthesia Evaluation   Pt has had prior anesthetic.     No history of anesthetic complications       ROS/MED HX  ENT/Pulmonary:  - neg pulmonary ROS     Neurologic:     (+)          CVA (multifocal subacute appearing strokes by MRI, forgetfullness over past 6 months per daughter), date: uncertain,                     Cardiovascular:     (+)  hypertension- -   -  - -   Taking blood thinners                                   METS/Exercise Tolerance:     Hematologic:       Musculoskeletal:       GI/Hepatic:       Renal/Genitourinary:     (+) renal disease,             Endo:       Psychiatric/Substance Use:       Infectious Disease:       Malignancy:       Other:            Physical Exam    Airway        Mallampati: III   TM distance: > 3 FB   Neck ROM: full     Respiratory Devices and Support         Dental       (+) Modest Abnormalities - crowns, retainers, 1 or 2 missing teeth      Cardiovascular          Rhythm and rate: regular and normal     Pulmonary           breath sounds clear to auscultation           OUTSIDE LABS:  CBC:   Lab Results   Component Value Date    WBC 9.4 10/19/2024    WBC 7.1  "10/18/2024    HGB 14.3 10/19/2024    HGB 15.2 10/18/2024    HCT 44.6 10/19/2024    HCT 49.1 (H) 10/18/2024     10/19/2024     10/18/2024     BMP:   Lab Results   Component Value Date     11/27/2024     10/19/2024    POTASSIUM 4.4 11/27/2024    POTASSIUM 3.7 10/19/2024    CHLORIDE 108 (H) 11/27/2024    CHLORIDE 104 10/19/2024    CO2 26 11/27/2024    CO2 20 (L) 10/19/2024    BUN 12.8 11/27/2024    BUN 8.6 10/19/2024    CR 0.95 11/27/2024    CR 0.93 10/19/2024    GLC 74 11/27/2024     (H) 10/19/2024     COAGS:   Lab Results   Component Value Date    PTT 27 10/21/2024    INR 1.02 10/21/2024     POC: No results found for: \"BGM\", \"HCG\", \"HCGS\"  HEPATIC:   Lab Results   Component Value Date    ALBUMIN 4.0 11/27/2024    PROTTOTAL 6.8 11/27/2024    ALT 14 11/27/2024    AST 20 11/27/2024    ALKPHOS 104 11/27/2024    BILITOTAL 0.2 11/27/2024     OTHER:   Lab Results   Component Value Date    LACT 1.4 10/18/2024    A1C 6.0 (H) 10/21/2024    DONNA 9.2 11/27/2024    MAG 2.2 10/19/2024    TSH 4.09 (H) 01/17/2022    T4 1.30 01/17/2022    .0 (H) 01/19/2022       Anesthesia Plan    ASA Status:  4    NPO Status:  NPO Appropriate    Anesthesia Type: MAC.              Consents    Anesthesia Plan(s) and associated risks, benefits, and realistic alternatives discussed. Questions answered and patient/representative(s) expressed understanding.     - Discussed: Risks, Benefits and Alternatives for the PROCEDURE were discussed     - Discussed with:       - Extended Intubation/Ventilatory Support Discussed: No.      - Patient is DNR/DNI Status: No     Use of blood products discussed: No .     Postoperative Care       PONV prophylaxis: Ondansetron (or other 5HT-3)     Comments:               Reza Espinoza MD    I have reviewed the pertinent notes and labs in the chart from the past 30 days and (re)examined the patient.  Any updates or changes from those notes are reflected in this note.             # " Drug Induced Platelet Defect: home medication list includes an antiplatelet medication   # Hypertension: Noted on problem list               # Financial/Environmental Concerns:

## 2024-12-24 ENCOUNTER — OFFICE VISIT (OUTPATIENT)
Dept: OPHTHALMOLOGY | Facility: CLINIC | Age: 74
End: 2024-12-24
Attending: OPHTHALMOLOGY
Payer: COMMERCIAL

## 2024-12-24 DIAGNOSIS — Z96.1 PSEUDOPHAKIA: Primary | ICD-10-CM

## 2024-12-24 DIAGNOSIS — H40.033 ANATOMICAL NARROW ANGLE BORDERLINE GLAUCOMA OF BOTH EYES: ICD-10-CM

## 2024-12-24 PROCEDURE — G0463 HOSPITAL OUTPT CLINIC VISIT: HCPCS | Performed by: OPHTHALMOLOGY

## 2024-12-24 ASSESSMENT — TONOMETRY
OD_IOP_MMHG: 50
OS_IOP_MMHG: 17
IOP_METHOD: TONOPEN

## 2024-12-24 ASSESSMENT — CONF VISUAL FIELD
OS_NORMAL: 1
OS_SUPERIOR_NASAL_RESTRICTION: 0
OD_SUPERIOR_NASAL_RESTRICTION: 1
OS_SUPERIOR_TEMPORAL_RESTRICTION: 0
OS_INFERIOR_TEMPORAL_RESTRICTION: 0
OD_INFERIOR_TEMPORAL_RESTRICTION: 1
METHOD: COUNTING FINGERS
OD_SUPERIOR_TEMPORAL_RESTRICTION: 1
OD_INFERIOR_NASAL_RESTRICTION: 1
OS_INFERIOR_NASAL_RESTRICTION: 0

## 2024-12-24 ASSESSMENT — VISUAL ACUITY
OS_PH_SC: 20/40
OS_PH_SC+: +1
OS_SC: 20/100
METHOD: SNELLEN - LINEAR
OS_SC+: +1
OD_SC: LP

## 2024-12-24 ASSESSMENT — EXTERNAL EXAM - RIGHT EYE: OD_EXAM: PROTECTIVE PTOSIS

## 2024-12-24 ASSESSMENT — EXTERNAL EXAM - LEFT EYE: OS_EXAM: NORMAL

## 2024-12-24 ASSESSMENT — SLIT LAMP EXAM - LIDS
COMMENTS: 2+ DERMATOCHALASIS
COMMENTS: 2+ DERMATOCHALASIS

## 2024-12-24 NOTE — PROGRESS NOTES
HPI       Post Op (Ophthalmology) Left Eye    Associated symptoms include burning (One of the medications (Ketorolac) gives a burning sensationin  pt's eye upon instillation).  Negative for eye pain, headache, flashes and floaters.  Treatments tried include eye drops.             Comments    Pt reports she is taking 3 eye drops that she received yesterday for the left eye. She has no complaints and states she can see today. She denies floaters but says she sees black spots when she wakes up in the morning, saw them this morning, too.     Ocular Meds:   Ketorolac QID left eye  Moxifloxacin TID left eye  Pred QID left eye    Naheed Anderson OA 9:30 AM December 24, 2024             Last edited by Naheed Anderson on 12/24/2024  9:30 AM.          Review of systems for the eyes was negative other than the pertinent positives/negatives listed in the HPI.      Assessment & Plan      Rolanda Molina is a 74 year old female with the following diagnoses:   1. Pseudophakia    2. Anatomical narrow angle borderline glaucoma of both eyes         Postoperative day 1 s/p complex cataract extraction with identified zonulopathy.  3p posterior chamber intraocular lens (PCIOL) and CTR    PostOp, left eye, day 1    Doing well  Keep patch in place at night for 5 days  Start post-operative drops and taper according to instructions  Post-operative do's and don'ts reviewed, questions answered    Recheck 2-3 weeks with refraction            Attending Physician Attestation:  Complete documentation of historical and exam elements from today's encounter can be found in the full encounter summary report (not reduplicated in this progress note).  I personally obtained the chief complaint(s) and history of present illness.  I confirmed and edited as necessary the review of systems, past medical/surgical history, family history, social history, and examination findings as documented by others; and I examined the patient myself.  I  personally reviewed the relevant tests, images, and reports as documented above.  I formulated and edited as necessary the assessment and plan and discussed the findings and management plan with the patient and family. . - Ashu Grey MD

## 2024-12-24 NOTE — NURSING NOTE
Chief Complaints and History of Present Illnesses   Patient presents with    Post Op (Ophthalmology) Left Eye     Chief Complaint(s) and History of Present Illness(es)       Post Op (Ophthalmology) Left Eye              Associated symptoms: burning (One of the medications (Ketorolac) gives a burning sensationin  pt's eye upon instillation).  Negative for eye pain, headache, flashes and floaters    Treatments tried: eye drops              Comments    Pt reports she is taking 3 eye drops that she received yesterday for the left eye. She has no complaints and states she can see today. She denies floaters but says she sees black spots when she wakes up in the morning, saw them this morning, too.     Ocular Meds:   Ketorolac QID left eye  Moxifloxacin TID left eye  Pred QID left eye    Naheed Anderson OA 9:30 AM December 24, 2024

## 2024-12-30 ENCOUNTER — TELEPHONE (OUTPATIENT)
Dept: INTERNAL MEDICINE | Facility: CLINIC | Age: 74
End: 2024-12-30
Payer: COMMERCIAL

## 2024-12-30 NOTE — TELEPHONE ENCOUNTER
Left detailed message on Se's phone.     Summer RN 1:07 PM December 30, 2024   [facility]

## 2024-12-30 NOTE — TELEPHONE ENCOUNTER
Home Care is calling regarding an established patient with [facility].       Requesting orders from: Ira Leong  Provider is following patient: No       Orders Requested    Skilled Nursing  Request for initial certification (first set of orders)   Frequency:  Initial eval      Confirmed ok to leave a detailed message with call back.  Contact information confirmed and updated as needed.    Candace Dobson RN

## 2024-12-31 ENCOUNTER — TELEPHONE (OUTPATIENT)
Dept: INTERNAL MEDICINE | Facility: CLINIC | Age: 74
End: 2024-12-31
Payer: COMMERCIAL

## 2025-01-02 ENCOUNTER — MEDICAL CORRESPONDENCE (OUTPATIENT)
Dept: HEALTH INFORMATION MANAGEMENT | Facility: CLINIC | Age: 75
End: 2025-01-02

## 2025-01-09 ENCOUNTER — OFFICE VISIT (OUTPATIENT)
Dept: OPHTHALMOLOGY | Facility: CLINIC | Age: 75
End: 2025-01-09
Attending: OPHTHALMOLOGY
Payer: COMMERCIAL

## 2025-01-09 DIAGNOSIS — Z98.890 POST-OPERATIVE STATE: Primary | ICD-10-CM

## 2025-01-09 DIAGNOSIS — H25.812 COMBINED FORM OF AGE-RELATED CATARACT, LEFT EYE: ICD-10-CM

## 2025-01-09 DIAGNOSIS — H40.89 OTHER GLAUCOMA OF RIGHT EYE: ICD-10-CM

## 2025-01-09 PROCEDURE — 92134 CPTRZ OPH DX IMG PST SGM RTA: CPT | Performed by: OPHTHALMOLOGY

## 2025-01-09 RX ORDER — BRIMONIDINE TARTRATE 2 MG/ML
1 SOLUTION/ DROPS OPHTHALMIC 3 TIMES DAILY
Qty: 15 ML | Refills: 1 | Status: SHIPPED | OUTPATIENT
Start: 2025-01-09

## 2025-01-09 RX ORDER — DORZOLAMIDE HYDROCHLORIDE AND TIMOLOL MALEATE 20; 5 MG/ML; MG/ML
1 SOLUTION/ DROPS OPHTHALMIC 2 TIMES DAILY
Qty: 10 ML | Refills: 3 | Status: SHIPPED | OUTPATIENT
Start: 2025-01-09

## 2025-01-09 RX ORDER — PREDNISOLONE ACETATE 10 MG/ML
1 SUSPENSION/ DROPS OPHTHALMIC 4 TIMES DAILY
Qty: 10 ML | Refills: 1 | Status: SHIPPED | OUTPATIENT
Start: 2025-01-09 | End: 2025-01-09

## 2025-01-09 RX ORDER — PREDNISOLONE ACETATE 10 MG/ML
1 SUSPENSION/ DROPS OPHTHALMIC 4 TIMES DAILY
Qty: 10 ML | Refills: 1 | Status: SHIPPED | OUTPATIENT
Start: 2025-01-09

## 2025-01-09 ASSESSMENT — TONOMETRY
IOP_METHOD: ICARE
OD_IOP_MMHG: 79
OS_IOP_MMHG: 16

## 2025-01-09 ASSESSMENT — VISUAL ACUITY
OS_SC+: -2
OD_SC: NLP
METHOD: SNELLEN - LINEAR
OS_SC: 20/50

## 2025-01-09 ASSESSMENT — SLIT LAMP EXAM - LIDS
COMMENTS: 2+ DERMATOCHALASIS
COMMENTS: 2+ DERMATOCHALASIS

## 2025-01-09 ASSESSMENT — EXTERNAL EXAM - LEFT EYE: OS_EXAM: NORMAL

## 2025-01-09 ASSESSMENT — EXTERNAL EXAM - RIGHT EYE: OD_EXAM: PROTECTIVE PTOSIS

## 2025-01-09 NOTE — PATIENT INSTRUCTIONS
Continue Prednisolone (pink top) four times a day  left eye     Continue Cosopt (blue top) twice a day both eyes     Continue brimonidine (purple top) three times a day both eyes     Artificial tears ok to use in both eyes as needed for irritation

## 2025-01-09 NOTE — NURSING NOTE
Chief Complaints and History of Present Illnesses   Patient presents with    Post Op (Ophthalmology) Left Eye     LEFT EYE COMPLEX PHACOEMULSIFICATION, CATARACT, WITH STANDARD INTRAOCULAR LENS IMPLANT INSERTION     Chief Complaint(s) and History of Present Illness(es)       Post Op (Ophthalmology) Left Eye              Laterality: left eye    Associated symptoms: floaters.  Negative for dryness, eye pain, tearing and flashes    Pain scale: 0/10    Comments: LEFT EYE COMPLEX PHACOEMULSIFICATION, CATARACT, WITH STANDARD INTRAOCULAR LENS IMPLANT INSERTION              Comments    Pt states vision is good.  No pain.  No flashes.  No change to floaters.  Pt is compliant with drops.    MUMTAZ Maurer January 9, 2025 9:59 AM

## 2025-01-09 NOTE — PROGRESS NOTES
HPI       Post Op (Ophthalmology) Left Eye    In left eye.  Associated symptoms include floaters.  Negative for dryness, eye pain, tearing and flashes.  Pain was noted as 0/10. Additional comments: LEFT EYE COMPLEX PHACOEMULSIFICATION, CATARACT, WITH STANDARD INTRAOCULAR LENS IMPLANT INSERTION             Comments    Pt states vision is good.  No pain.  No flashes.  No change to floaters.  Pt is compliant with drops.    MUMTAZ Maurer January 9, 2025 9:59 AM                Last edited by Manny Quinones COT on 1/9/2025  9:59 AM.          Review of systems for the eyes was negative other than the pertinent positives/negatives listed in the HPI.      Assessment & Plan      Rolanda Molina is a 74 year old female with the following diagnoses:   1. Post-operative state         S/P complex cataract extraction 12/23/24 with diffuse zonulopathy and CTR placement  Doing well  Intraocular pressure acceptable  K edema improving  Ok to resume normal activities  Increase Predforte back to four times a day left eye   Continue Cosopt twice a day both eyes   Continue brimonidine three times a day both eyes   Artificial tears as needed      Patient disposition:   Return in about 3 weeks (around 1/30/2025) for Refraction, DFE, Optos.           Attending Physician Attestation:  Complete documentation of historical and exam elements from today's encounter can be found in the full encounter summary report (not reduplicated in this progress note).  I personally obtained the chief complaint(s) and history of present illness.  I confirmed and edited as necessary the review of systems, past medical/surgical history, family history, social history, and examination findings as documented by others; and I examined the patient myself.  I personally reviewed the relevant tests, images, and reports as documented above.  I formulated and edited as necessary the assessment and plan and discussed the findings and management plan with the  patient and family. . - Ashu Grey MD

## 2025-01-10 ENCOUNTER — TELEPHONE (OUTPATIENT)
Dept: INTERNAL MEDICINE | Facility: CLINIC | Age: 75
End: 2025-01-10
Payer: COMMERCIAL

## 2025-01-10 NOTE — TELEPHONE ENCOUNTER
12/27/24 missed visit/ 1/1/25 missed visit/ 1/7/25 discharge/ 1/9/25 new start of care orders received via fax. Form in your mailbox to be signed.     #[id number], [id number]

## 2025-01-29 ENCOUNTER — TELEPHONE (OUTPATIENT)
Dept: INTERNAL MEDICINE | Facility: CLINIC | Age: 75
End: 2025-01-29

## 2025-01-29 NOTE — TELEPHONE ENCOUNTER
Left voice message for Se to clarify if he is seeking order for early discharge from home care due to patient going on extended trip or if he is requesting new orders due to cognitive concerns.     Joelle Johnson RN  [facility]

## 2025-01-29 NOTE — TELEPHONE ENCOUNTER
Home Health Care    Se, therapist with [facility] calls today.     Reason for call:  Se says the patient is leaving for [place] for an extended period of time. Due to this she will be discharged from therapy. Se also added that he is recommending the patient live in assisted living due to her cognition impairment. Due to this cognition impairment the patient should also not be managing her own medication nor should her family but instead by someone other than family.           Orders are needed for this patient.      Pt Provider: previous Dr Leong patient. Now assigned to Dr South who is watching letter B.     Phone Number Homecare Nurse can be reached at: eS can be reached at [phone]      Okay to leave a detailed message?: Yes

## 2025-01-30 ENCOUNTER — MEDICAL CORRESPONDENCE (OUTPATIENT)
Dept: HEALTH INFORMATION MANAGEMENT | Facility: CLINIC | Age: 75
End: 2025-01-30

## 2025-01-30 ENCOUNTER — TELEPHONE (OUTPATIENT)
Dept: INTERNAL MEDICINE | Facility: CLINIC | Age: 75
End: 2025-01-30
Payer: COMMERCIAL

## 2025-01-31 NOTE — TELEPHONE ENCOUNTER
Se returned call and stated he was letting provider know that he was discharging patient due to patient leaving on an extended trip.    Se also wanted provider to know that he doesn't believe patient should be living alone due to cognitive status.  He believes patient should be living in an Assisted Living.

## 2025-01-31 NOTE — TELEPHONE ENCOUNTER
Attempt # 3  Called Phone # [phone] (Se from Home Health Care)     Was Call answered? No.     Non-detailed voicemail left on January 31, 2025 9:01 AM to call clinic at: [phone].     On Call Back:     Please relay need to clarify if he is seeking order for early discharge from home care due to patient going on extended trip or if he is requesting new orders due to cognitive concerns.     Thank you,  Alan, Triage ARA Bustos    9:01 AM 1/31/2025

## 2025-02-05 ENCOUNTER — TELEPHONE (OUTPATIENT)
Dept: INTERNAL MEDICINE | Facility: CLINIC | Age: 75
End: 2025-02-05
Payer: COMMERCIAL

## 2025-02-05 DIAGNOSIS — Z53.9 DIAGNOSIS NOT YET DEFINED: Primary | ICD-10-CM

## 2025-02-05 PROCEDURE — G0180 MD CERTIFICATION HHA PATIENT: HCPCS | Performed by: INTERNAL MEDICINE

## 2025-02-05 NOTE — TELEPHONE ENCOUNTER
[facility] * order# [id number] Plan of Care 1- to 3- received via fax     Forms in DrJolie mail box for review and signature.

## 2025-02-06 ENCOUNTER — TELEPHONE (OUTPATIENT)
Dept: INTERNAL MEDICINE | Facility: CLINIC | Age: 75
End: 2025-02-06

## 2025-02-06 ENCOUNTER — OFFICE VISIT (OUTPATIENT)
Dept: OPHTHALMOLOGY | Facility: CLINIC | Age: 75
End: 2025-02-06
Attending: OPHTHALMOLOGY
Payer: COMMERCIAL

## 2025-02-06 DIAGNOSIS — H40.033 ANATOMICAL NARROW ANGLE BORDERLINE GLAUCOMA OF BOTH EYES: ICD-10-CM

## 2025-02-06 DIAGNOSIS — H54.40 BLINDNESS OF RIGHT EYE WITH NORMAL VISION IN CONTRALATERAL EYE: ICD-10-CM

## 2025-02-06 DIAGNOSIS — Z96.1 PSEUDOPHAKIA: Primary | ICD-10-CM

## 2025-02-06 PROCEDURE — G0463 HOSPITAL OUTPT CLINIC VISIT: HCPCS | Performed by: OPHTHALMOLOGY

## 2025-02-06 ASSESSMENT — VISUAL ACUITY
OS_SC: 20/40
OS_PH_SC: 20/30
METHOD: SNELLEN - LINEAR
OS_PH_SC+: -2
OD_SC: NLP

## 2025-02-06 ASSESSMENT — EXTERNAL EXAM - RIGHT EYE: OD_EXAM: PROTECTIVE PTOSIS

## 2025-02-06 ASSESSMENT — TONOMETRY
OD_IOP_MMHG: 75
IOP_METHOD: TONOPEN
OS_IOP_MMHG: 22

## 2025-02-06 ASSESSMENT — SLIT LAMP EXAM - LIDS
COMMENTS: 2+ DERMATOCHALASIS
COMMENTS: 2+ DERMATOCHALASIS

## 2025-02-06 ASSESSMENT — REFRACTION_MANIFEST
OD_SPHERE: BALANCE
OS_SPHERE: -1.25
OS_CYLINDER: SPHERE

## 2025-02-06 ASSESSMENT — EXTERNAL EXAM - LEFT EYE: OS_EXAM: NORMAL

## 2025-02-06 ASSESSMENT — CUP TO DISC RATIO: OS_RATIO: 0.4

## 2025-02-06 NOTE — PROGRESS NOTES
HPI       Post Op (Ophthalmology) Left Eye    In left eye.  Associated symptoms include floaters.  Negative for dryness, eye pain, tearing and flashes.  Treatments tried include eye drops.  Pain was noted as 0/10. Additional comments: LE CE IOL PO             Comments    Pt states vision is the same, some cloudiness.  No pain.  No flashes.  No change to floaters.  Pt compliant with drops, ran out of brimonidine last weekend.    MUMTAZ Maurer February 6, 2025 10:06 AM              Last edited by Manny Quinones COT on 2/6/2025 10:06 AM.          Review of systems for the eyes was negative other than the pertinent positives/negatives listed in the HPI.      Assessment & Plan      Rolanda Molina is a 74 year old female with the following diagnoses:   1. Pseudophakia    2. Anatomical narrow angle borderline glaucoma of both eyes    3. Blindness of right eye with normal vision in contralateral eye         S/P complex cataract extraction 12/23/24 with diffuse zonulopathy and CTR placement  Doing well  Intraocular pressure acceptable    Continue Cosopt twice a day both eyes   Stop brimonidine  Taper Predforte to twice a day for 1 week, then once a day for 1 week, then stop   Artificial tears as needed    Full time monocular precaution to continue      Patient disposition:   Return in about 6 months (around 8/6/2025) for VT only, Optos, OCT NFL.           Attending Physician Attestation:  Complete documentation of historical and exam elements from today's encounter can be found in the full encounter summary report (not reduplicated in this progress note).  I personally obtained the chief complaint(s) and history of present illness.  I confirmed and edited as necessary the review of systems, past medical/surgical history, family history, social history, and examination findings as documented by others; and I examined the patient myself.  I personally reviewed the relevant tests, images, and reports as documented  above.  I formulated and edited as necessary the assessment and plan and discussed the findings and management plan with the patient and family. . - Ashu Grey MD

## 2025-02-06 NOTE — NURSING NOTE
Chief Complaints and History of Present Illnesses   Patient presents with    Post Op (Ophthalmology) Left Eye     LE CE IOL PO     Chief Complaint(s) and History of Present Illness(es)       Post Op (Ophthalmology) Left Eye              Laterality: left eye    Associated symptoms: floaters.  Negative for dryness, eye pain, tearing and flashes    Treatments tried: eye drops    Pain scale: 0/10    Comments: LE CE IOL PO              Comments    Pt states vision is the same, some cloudiness.  No pain.  No flashes.  No change to floaters.  Pt compliant with drops, ran out of brimonidine last weekend.    MUMTAZ Maurer February 6, 2025 10:06 AM

## 2025-02-06 NOTE — TELEPHONE ENCOUNTER
Home Health calling to see if Dr South will follow the patient for homecare orders.     Senthil [phone].

## 2025-02-06 NOTE — PATIENT INSTRUCTIONS
Taper prednisolone (pink top) to twice a day for 1 week, then once a day for 1 week, then stop     Continue Cosopt twice a day both eyes (blue top)    Stop brimonidine (purple top)

## 2025-03-19 ENCOUNTER — HOSPITAL ENCOUNTER (EMERGENCY)
Facility: CLINIC | Age: 75
Discharge: HOME | End: 2025-03-19
Attending: FAMILY MEDICINE | Admitting: FAMILY MEDICINE
Payer: COMMERCIAL

## 2025-03-19 VITALS
WEIGHT: 159.5 LBS | HEART RATE: 69 BPM | HEIGHT: 67 IN | SYSTOLIC BLOOD PRESSURE: 165 MMHG | RESPIRATION RATE: 16 BRPM | TEMPERATURE: 98.3 F | OXYGEN SATURATION: 99 % | BODY MASS INDEX: 25.03 KG/M2 | DIASTOLIC BLOOD PRESSURE: 117 MMHG

## 2025-03-19 DIAGNOSIS — H57.11 EYE PAIN, RIGHT: ICD-10-CM

## 2025-03-19 DIAGNOSIS — H40.2210: ICD-10-CM

## 2025-03-19 DIAGNOSIS — H40.2213 CHRONIC PRIMARY ANGLE-CLOSURE GLAUCOMA OF RIGHT EYE, SEVERE STAGE: Primary | ICD-10-CM

## 2025-03-19 PROCEDURE — 250N000009 HC RX 250: Performed by: FAMILY MEDICINE

## 2025-03-19 PROCEDURE — 99284 EMERGENCY DEPT VISIT MOD MDM: CPT | Performed by: FAMILY MEDICINE

## 2025-03-19 RX ORDER — PROPARACAINE HYDROCHLORIDE 5 MG/ML
1 SOLUTION/ DROPS OPHTHALMIC ONCE
Status: COMPLETED | OUTPATIENT
Start: 2025-03-19 | End: 2025-03-19

## 2025-03-19 RX ORDER — LATANOPROST 50 UG/ML
1 SOLUTION/ DROPS OPHTHALMIC AT BEDTIME
Qty: 7.5 ML | Refills: 2 | Status: SHIPPED | OUTPATIENT
Start: 2025-03-19

## 2025-03-19 RX ORDER — BRIMONIDINE TARTRATE 2 MG/ML
1 SOLUTION/ DROPS OPHTHALMIC 3 TIMES DAILY
Qty: 15 ML | Refills: 2 | Status: SHIPPED | OUTPATIENT
Start: 2025-03-19

## 2025-03-19 RX ADMIN — PROPARACAINE HYDROCHLORIDE 1 DROP: 5 SOLUTION/ DROPS OPHTHALMIC at 12:30

## 2025-03-19 RX ADMIN — FLUORESCEIN SODIUM 1 STRIP: 1 STRIP OPHTHALMIC at 12:30

## 2025-03-19 ASSESSMENT — ACTIVITIES OF DAILY LIVING (ADL)
ADLS_ACUITY_SCORE: 58
ADLS_ACUITY_SCORE: 58

## 2025-03-19 NOTE — CONSULTS
OPHTHALMOLOGY CONSULT NOTE  03/19/25    Patient: Rolanda Molina      ASSESSMENT/PLAN:     Rolanda Molina is a 74 year old female with Hx of  anatomical narrow angle borderline glaucoma of both eyes who presented to [facility] ED with right eye pain, redness, and tearing.     #Anatomical narrow angle glaucoma, both eyes  #Blind painful eye, right eye  Va NLP right eye, 20/40 +2 left eye, IOP of the right eye was too high to read with tonopen, IOP of the left eye was 23. Anterior segment exam of the right eye notable for  2+ injection and diffuse corneal edema. No appreciable corneal abrasion, ulceration or hypopyon in the anterior chamber. Limited view to the fundus with indirect ophthalmoscope and thus a B- scan ultrasound was performed. No appreciable vitritis or overt retinal detachment was seen. Anterior segment exam of left eye with no acute pathology noted.     Given NLP vision in the right eye with minimal vision potential coupled with persistent pain, discussion regarding referral to oculoplastics for an evisceration/enucleation was had. Patient demonstrated understanding and was agreeable to an outpatient referral.    PLAN (ordered for you):  -START Brimonidine TID, right eye  -START Latanoprost at bedtime, right eye  -Continue COSOPT BID, both eyes  -Monocular precautions discussed  -Referral to oculoplastics for evisceration/enucleation evaluation      Please contact ophthalmologist on call with any questions or concerns. We appreciate your care of this patient.    Thank you for entrusting us with your care  Yessi Jay MD, PGY2  Ophthalmology Resident  [facility]    HISTORY OF PRESENTING ILLNESS:     Rolanda Molina is a 74 year old female who presented on 3/19/25 with 2 weeks of right eye pain, redness, and increased tearing. Patient denies any recent edgard-orbital trauma. Left eye is asymptomatic. Endorses good adherence to Cosopt BID in both eyes.    Patient last seen in  [facility] eye clinic by  (2/6/25). At that visit patient was 2 months s/p complex cataract extraction  with diffuse zonulopathy and CTR placement  (12/23/24).    10+ review of systems were otherwise negative except for that which has been stated above.      OCULAR/MEDICAL/SURGICAL HISTORIES:     Past Ocular History:   Last eye exam: 2/6/25   Previously diagnosed ocular conditions: anatomical narrow angle borderline glaucoma, both eyes  Prior eye surgery/laser: Complex cataract extraction 12/23/24 with diffuse zonulopathy and CTR placement   Contact lens wear: None  Glasses: No  Eyedrops: Cosopt BID, both eyes    Pertinent Systemic Medications:   Current Facility-Administered Medications   Medication Dose Route Frequency Provider Last Rate Last Admin    fluorescein (FUL-MARQUITA) ophthalmic strip 1 strip  1 strip Both Eyes Once Drew Beaver MD        proparacaine (ALCAINE) 0.5 % ophthalmic solution 1 drop  1 drop Both Eyes Once Drew Beaver MD         Current Outpatient Medications   Medication Sig Dispense Refill    atorvastatin (LIPITOR) 40 MG tablet Take 1 tablet (40 mg) by mouth every evening.      dorzolamide-timolol (COSOPT) 2-0.5 % ophthalmic solution Place 1 drop into both eyes 2 times daily. 10 mL 3    hydrALAZINE (APRESOLINE) 10 MG tablet TAKE 1 TABLET BY MOUTH EVERY 4 HOURS AS NEEDED FOR HIGH BLOOD PRESSURE 90 tablet 0    aspirin (ASA) 81 MG chewable tablet Take 1 tablet (81 mg) by mouth daily.      brimonidine (ALPHAGAN) 0.2 % ophthalmic solution Place 1 drop into both eyes 3 times daily. 15 mL 1    calcium carbonate (TUMS) 500 MG chewable tablet Take 1 tablet (500 mg) by mouth 4 times daily as needed for heartburn.      clopidogrel (PLAVIX) 75 MG tablet Take 1 tablet (75 mg) by mouth daily for 16 days.      fluorometholone (FML LIQUIFILM) 0.1 % ophthalmic suspension Place 1 drop into the right eye 2 times daily.      ibuprofen (ADVIL/MOTRIN) 200 MG capsule Take 400 mg by mouth every 6 hours as needed  for moderate pain or mild pain.      lidocaine (LMX4) 4 % external cream Apply topically every hour as needed for pain (with VAD insertion). Please dispense available cream if other options are avaialble. 5 g 0    lidocaine 1 % SOLN 0.1-1 mLs by Other route every hour as needed (mild pain with VAD insertion). 30 mL 0    losartan (COZAAR) 25 MG tablet Take 0.5 tablets (12.5 mg) by mouth daily. 45 tablet 3    ondansetron (ZOFRAN ODT) 4 MG ODT tab Take 1 tablet (4 mg) by mouth every 6 hours as needed for nausea or vomiting.      ondansetron (ZOFRAN) 2 MG/ML injection Inject 2 mLs (4 mg) over 2-5 minutes into the vein every 6 hours as needed for nausea or vomiting. Ok to dispense other volumes 30 mL 0    prednisoLONE acetate (PRED FORTE) 1 % ophthalmic suspension Place 1 drop Into the left eye 4 times daily. To operative eye 10 mL 1    senna-docusate (SENOKOT-S/PERICOLACE) 8.6-50 MG tablet Take 1 tablet by mouth 2 times daily as needed for constipation.      senna-docusate (SENOKOT-S/PERICOLACE) 8.6-50 MG tablet Take 2 tablets by mouth 2 times daily as needed for constipation.      sodium chloride, PF, 0.9% PF flush Inject 3 mLs into catheter every 8 hours.      sodium chloride, PF, 0.9% PF flush Inject 3 mLs into catheter every 10 minutes.           Past Medical History:  Past Medical History:   Diagnosis Date    Hypertension        Past Surgical History:   Past Surgical History:   Procedure Laterality Date    CATARACT IOL, RT/LT      COLONOSCOPY N/A 08/30/2021    Procedure: Colonoscopy, With Polypectomy And Biopsy;  Surgeon: Federico Vital MD;  Location:  GI    PHACOEMULSIFICATION WITH STANDARD INTRAOCULAR LENS IMPLANT Left 12/23/2024    Procedure: LEFT EYE COMPLEX PHACOEMULSIFICATION, CATARACT, WITH STANDARD INTRAOCULAR LENS IMPLANT INSERTION;  Surgeon: Ashu Grey MD;  [place] [facility]       Family History:  Mother and sister with glaucoma     EXAMINATION:     Base Eye Exam       Visual Acuity  (Snellen - Linear)         Right Left    Near sc NLP 20/40+2    Near cc  NI              Tonometry (Tonopen, 1:59 PM)         Right Left    Pressure unreadable on tonopen 23              Neuro/Psych       Oriented x3: Yes    Mood/Affect: Normal                  Slit Lamp and Fundus Exam       External Exam         Right Left    External Protective ptosis Normal              Slit Lamp Exam         Right Left    Lids/Lashes 2+ Dermatochalasis 2+ Dermatochalasis    Conjunctiva/Sclera 2+ injection White and quiet    Cornea diffuse coneal edema Arcus Senilis, 1+ PEE    Anterior Chamber Deep centrally, shallow peripherally, no obvious hypopyon Deep and quiet    Iris Mid-fixed dilated Round and reactive    Lens 2-3+ Nuclear sclerosis cataract, 2+ Cortical cataract PCIOL    Anterior Vitreous  PVD              Fundus Exam         Right Left    Disc Hazy view                          Yessi Jay MD  Resident Physician, PGY2  Department of Ophthalmology

## 2025-03-19 NOTE — ED PROVIDER NOTES
[facility] EMERGENCY DEPARTMENT ([facility])    3/19/25      ED PROVIDER NOTE   History     Chief Complaint   Patient presents with    Eye Problem     Hx of glaucoma in Rt eye. Pt states redness and blurriness got worse 1 week ago. Pt states she has been having headaches. Last seen by ophthalmology in feb.      The history is provided by the patient and medical records.     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  She is a accompanied by family who provides additional history.  Family states that she has been having these issues with her eyes over the past year.  She states patient was seen in the ED twice in October 2024 and that it was not until her second visit that they were able to find out what was wrong with her including glaucoma .  She was found to have ocular hypertension of the left eye with hyperoxia as well.  Patient did have left eye surgery in January this year and is down to 1 eyedrop currently.  Family states that A few weeks ago developed increased right eye redness and pain.  Today she developed increased tearing of the right eye.  She now presents for further evaluation of this.     Past Medical History  Past Medical History:   Diagnosis Date    Hypertension      Past Surgical History:   Procedure Laterality Date    CATARACT IOL, RT/LT      COLONOSCOPY N/A 08/30/2021    Procedure: Colonoscopy, With Polypectomy And Biopsy;  Surgeon: Federico Vital MD;  Location:  GI    PHACOEMULSIFICATION WITH STANDARD INTRAOCULAR LENS IMPLANT Left 12/23/2024    Procedure: LEFT EYE COMPLEX PHACOEMULSIFICATION, CATARACT, WITH STANDARD INTRAOCULAR LENS IMPLANT INSERTION;  Surgeon: Ashu Grey MD;   "Location: [facility] OR     atorvastatin (LIPITOR) 40 MG tablet  brimonidine (ALPHAGAN) 0.2 % ophthalmic solution  dorzolamide-timolol (COSOPT) 2-0.5 % ophthalmic solution  hydrALAZINE (APRESOLINE) 10 MG tablet  latanoprost (XALATAN) 0.005 % ophthalmic solution  aspirin (ASA) 81 MG chewable tablet  calcium carbonate (TUMS) 500 MG chewable tablet  clopidogrel (PLAVIX) 75 MG tablet  fluorometholone (FML LIQUIFILM) 0.1 % ophthalmic suspension  ibuprofen (ADVIL/MOTRIN) 200 MG capsule  lidocaine (LMX4) 4 % external cream  lidocaine 1 % SOLN  losartan (COZAAR) 25 MG tablet  ondansetron (ZOFRAN ODT) 4 MG ODT tab  ondansetron (ZOFRAN) 2 MG/ML injection  prednisoLONE acetate (PRED FORTE) 1 % ophthalmic suspension  senna-docusate (SENOKOT-S/PERICOLACE) 8.6-50 MG tablet  senna-docusate (SENOKOT-S/PERICOLACE) 8.6-50 MG tablet  sodium chloride, PF, 0.9% PF flush  sodium chloride, PF, 0.9% PF flush      Allergies   Allergen Reactions    Penicillins      Swelling of arms and legs     Family History  Family History   Problem Relation Age of Onset    Glaucoma Mother     Diabetes Mother         Type II    Thyroid Cancer Mother     Glaucoma Sister     Colon Cancer No family hx of     Macular Degeneration No family hx of      Social History   Social History     Tobacco Use    Smoking status: Every Day    Smokeless tobacco: Never    Tobacco comments:     starting smoking at age 26   Vaping Use    Vaping status: Never Used   Substance Use Topics    Alcohol use: Yes    Drug use: Never      A medically appropriate review of systems was performed with pertinent positives and negatives noted in the HPI, and all other systems negative.    Physical Exam   BP: (!) 162/106  Pulse: 75  Temp: 98.3  F (36.8  C)  Resp: 16  Height: 170.2 cm (5' 7\")  Weight: 72.3 kg (159 lb 8 oz)  SpO2: 99 %    Physical Exam  General: Patient is in no acute distress and is resting comfortably.  HEENT: Normocephalic atraumatic.  Right eye with opacified cornea, nonreactive " pupil, injected conjunctiva, see photo below  Neck: Supple  Cardiovascular: Heart rate normal  Pulmonary: Patient is in no respiratory distress  Extremities: No signs of any significant or life-threatening trauma.  Neurologic: No new focal neurologic deficits.          ED Course, Procedures, & Data     ED Course as of 03/19/25 1329   Wed Mar 19, 2025   1135 Ophthalmology paged MICHOACANO JAY [ Msg Id [id number] ]     Procedures       A consult was attained from the ophthalmology service. The case was discussed with Dr. Jay from that service. The consulting service's recommendations were provided at 1:15 PM.    No results found for any visits on 03/19/25.  Medications   proparacaine (ALCAINE) 0.5 % ophthalmic solution 1 drop (1 drop Both Eyes $Given by Other 3/19/25 1230)   fluorescein (FUL-MARQUITA) ophthalmic strip 1 strip (1 strip Both Eyes $Given by Other 3/19/25 1230)     Labs Ordered and Resulted from Time of ED Arrival to Time of ED Departure - No data to display  No orders to display          Critical care was not performed.     Medical Decision Making  The patient's presentation was of moderate complexity (a chronic illness mild to moderate exacerbation, progression, or side effect of treatment).    The patient's evaluation involved:  review of external note(s) from 2 sources (review of hospital discharge October 2024, review of most recent ophthalmology clinic notes)  discussion of management or test interpretation with another health professional (ophthalmology consulted and saw patient in ED)    The patient's management necessitated moderate risk (prescription drug management including medications given in the ED).    Assessment & Plan    Rolanda Molina is a 74 year old female who presents with right eye redness and pain.  She was seen by ophthalmology resident.  Her eye falls into blind, painful eye category but there are no concerns for endophthalmitis or other ocular infections.   Ophthalmology  recommended that she follow-up with oculoplastics for enucleation versus evisceration.  She was written for IOP lowering drops as well as given a referral to oculoplastics.   We discussed the indications for emergency department return and follow-up.  Stable for discharge.      I have reviewed the nursing notes. I have reviewed the findings, diagnosis, plan and need for follow up with the patient.    Current Discharge Medication List        START taking these medications    Details   latanoprost (XALATAN) 0.005 % ophthalmic solution Place 1 drop into the right eye at bedtime.  Qty: 7.5 mL, Refills: 2    Associated Diagnoses: Chronic primary angle-closure glaucoma of right eye, severe stage             Final diagnoses:   Eye pain, right   Chronic angle-closure glaucoma of right eye, unspecified glaucoma stage     I, Christiane Garcia, am serving as a trained medical scribe to document services personally performed by Drew Beaver MD based on the provider's statements to me on March 19, 2025.  This document has been checked and approved by the attending provider.    I, Drew Beaver MD, was physically present and have reviewed and verified the accuracy of this note documented by Christiane Garcia, medical scribe.      Drew Beaver MD   [facility] EMERGENCY DEPARTMENT  3/19/2025        Drew Beaver MD  03/19/25 8834

## 2025-03-19 NOTE — DISCHARGE INSTRUCTIONS
Please follow up with Eye [facility] (phone: [phone]) regarding your decision about potential further eye surgery.  Take drops prescribed by ophthalmology.

## 2025-03-19 NOTE — ED NOTES
Patient has been hypertensive in ED.  MD aware and recommends that patient continue to take her BP meds and follow with her PCP as this is a chronic issue for patient.  Patient made aware of this.  Discharged with daughter accompanying.

## 2025-03-19 NOTE — ED TRIAGE NOTES
Hx of glaucoma in Rt eye. Pt states redness and blurriness got worse 1 week ago. Pt states she has been having headaches. Last seen by ophthalmology in feb.    Triage Assessment (Adult)       Row Name 03/19/25 1117          Triage Assessment    Airway WDL WDL        Respiratory WDL    Respiratory WDL WDL        Skin Circulation/Temperature WDL    Skin Circulation/Temperature WDL WDL        Cardiac WDL    Cardiac WDL WDL        Peripheral/Neurovascular WDL    Peripheral Neurovascular WDL WDL        Cognitive/Neuro/Behavioral WDL    Cognitive/Neuro/Behavioral WDL WDL

## 2025-03-20 ENCOUNTER — TELEPHONE (OUTPATIENT)
Dept: OPHTHALMOLOGY | Facility: CLINIC | Age: 75
End: 2025-03-20
Payer: COMMERCIAL

## 2025-03-20 NOTE — TELEPHONE ENCOUNTER
LVM for patient regarding ED follow up for a blind painful eye and interested in a possible evisceration/enucleation consultation.Provided direct number for scheduling options with either  or .

## 2025-03-24 ENCOUNTER — TELEPHONE (OUTPATIENT)
Dept: OPHTHALMOLOGY | Facility: CLINIC | Age: 75
End: 2025-03-24
Payer: COMMERCIAL

## 2025-03-24 NOTE — TELEPHONE ENCOUNTER
FUTURE VISIT INFORMATION      FUTURE VISIT INFORMATION:  Date: 3/31/2025  Time: 10:30 AM  Location: [facility]  REFERRAL INFORMATION:  Referring provider: Dr. Drew Beaver  Referring providers clinic: [facility] - ED  Reason for visit/diagnosis: ED follow up for a blind painful eye and interested in a possible evisceration/enucleation consultation     RECORDS REQUESTED FROM:       Clinic name Comments Records Status Imaging Status   [facility] 3/19/25 - ED OV with Dr. Beaver  10/18/24 - Admission with Dr. Liang  10/7/24 - ED OV with Dr. Leatha Howell    [facility] 2/6/25, 1/9/25, 12/24/24 - EYE OV with Dr. Grey  10/22/24 - EYE OV with Dr. Pierre Howell    [facility] 6/5/23 - EYE OV with Dr. Ean Howell    [facility] - Imaging 10/20/24 - CTA Head/Neck  10/20/24 - MRI Brain  10/18/24 - CT Head  1/17/22 - CT Head [facility] PACs   [facility] 12/23/24 - OP Note for LEFT EYE COMPLEX PHACOEMULSIFICATION, CATARACT, WITH STANDARD INTRAOCULAR LENS IMPLANT INSERTION with Dr. Que Howell

## 2025-03-24 NOTE — TELEPHONE ENCOUNTER
Spoke with patient's daughter regarding ED follow up for a blind painful eye and interested in a possible evisceration/enucleation consultation.     Daughter declined scheduling as offered for 3/25/25.     Scheduled patient according as offered with either  or . Provided appointment details and sent reminder letter to confirmed address.-Per Patient's Daughter

## 2025-03-31 ENCOUNTER — PRE VISIT (OUTPATIENT)
Dept: OPHTHALMOLOGY | Facility: CLINIC | Age: 75
End: 2025-03-31

## 2025-04-23 ENCOUNTER — HOSPITAL ENCOUNTER (OUTPATIENT)
Facility: CLINIC | Age: 75
Setting detail: OBSERVATION
End: 2025-04-23
Attending: STUDENT IN AN ORGANIZED HEALTH CARE EDUCATION/TRAINING PROGRAM | Admitting: STUDENT IN AN ORGANIZED HEALTH CARE EDUCATION/TRAINING PROGRAM
Payer: COMMERCIAL

## 2025-04-23 ENCOUNTER — APPOINTMENT (OUTPATIENT)
Dept: CT IMAGING | Facility: CLINIC | Age: 75
End: 2025-04-23
Attending: STUDENT IN AN ORGANIZED HEALTH CARE EDUCATION/TRAINING PROGRAM
Payer: COMMERCIAL

## 2025-04-23 ENCOUNTER — APPOINTMENT (OUTPATIENT)
Dept: GENERAL RADIOLOGY | Facility: CLINIC | Age: 75
End: 2025-04-23
Attending: STUDENT IN AN ORGANIZED HEALTH CARE EDUCATION/TRAINING PROGRAM
Payer: COMMERCIAL

## 2025-04-23 DIAGNOSIS — I63.19 CEREBROVASCULAR ACCIDENT (CVA) DUE TO EMBOLISM OF OTHER PRECEREBRAL ARTERY (H): ICD-10-CM

## 2025-04-23 DIAGNOSIS — I15.9 SECONDARY HYPERTENSION: ICD-10-CM

## 2025-04-23 DIAGNOSIS — R53.1 WEAKNESS GENERALIZED: ICD-10-CM

## 2025-04-23 DIAGNOSIS — M62.81 GENERALIZED MUSCLE WEAKNESS: ICD-10-CM

## 2025-04-23 DIAGNOSIS — I10 HYPERTENSION, UNSPECIFIED TYPE: ICD-10-CM

## 2025-04-23 DIAGNOSIS — G93.89 CEREBRAL VENTRICULOMEGALY: ICD-10-CM
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
## 2025-04-23 DIAGNOSIS — R26.81 UNSTEADY GAIT: Primary | ICD-10-CM
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
## 2025-04-23 LAB
ALBUMIN SERPL BCG-MCNC: 4.4 G/DL (ref 3.5–5.2)
ALP SERPL-CCNC: 84 U/L (ref 40–150)
ALT SERPL W P-5'-P-CCNC: 10 U/L (ref 0–50)
ANION GAP SERPL CALCULATED.3IONS-SCNC: 11 MMOL/L (ref 7–15)
AST SERPL W P-5'-P-CCNC: 20 U/L (ref 0–45)
BASOPHILS # BLD AUTO: 0 10E3/UL (ref 0–0.2)
BASOPHILS NFR BLD AUTO: 0 %
BILIRUB SERPL-MCNC: 0.4 MG/DL
BUN SERPL-MCNC: 11.4 MG/DL (ref 8–23)
CALCIUM SERPL-MCNC: 9.4 MG/DL (ref 8.8–10.4)
CHLORIDE SERPL-SCNC: 102 MMOL/L (ref 98–107)
CK SERPL-CCNC: 211 U/L (ref 26–192)
CREAT SERPL-MCNC: 0.83 MG/DL (ref 0.51–0.95)
EGFRCR SERPLBLD CKD-EPI 2021: 73 ML/MIN/1.73M2
EOSINOPHIL # BLD AUTO: 0.1 10E3/UL (ref 0–0.7)
EOSINOPHIL NFR BLD AUTO: 1 %
ERYTHROCYTE [DISTWIDTH] IN BLOOD BY AUTOMATED COUNT: 15.4 % (ref 10–15)
GLUCOSE SERPL-MCNC: 99 MG/DL (ref 70–99)
HCO3 SERPL-SCNC: 24 MMOL/L (ref 22–29)
HCT VFR BLD AUTO: 39.4 % (ref 35–47)
HGB BLD-MCNC: 12.5 G/DL (ref 11.7–15.7)
IMM GRANULOCYTES # BLD: 0 10E3/UL
IMM GRANULOCYTES NFR BLD: 0 %
INR PPP: 0.98 (ref 0.85–1.15)
LYMPHOCYTES # BLD AUTO: 2.5 10E3/UL (ref 0.8–5.3)
LYMPHOCYTES NFR BLD AUTO: 32 %
MCH RBC QN AUTO: 25.4 PG (ref 26.5–33)
MCHC RBC AUTO-ENTMCNC: 31.7 G/DL (ref 31.5–36.5)
MCV RBC AUTO: 80 FL (ref 78–100)
MONOCYTES # BLD AUTO: 0.6 10E3/UL (ref 0–1.3)
MONOCYTES NFR BLD AUTO: 7 %
NEUTROPHILS # BLD AUTO: 4.5 10E3/UL (ref 1.6–8.3)
NEUTROPHILS NFR BLD AUTO: 59 %
NRBC # BLD AUTO: 0 10E3/UL
NRBC BLD AUTO-RTO: 0 /100
PLATELET # BLD AUTO: 194 10E3/UL (ref 150–450)
POTASSIUM SERPL-SCNC: 3.3 MMOL/L (ref 3.4–5.3)
PROT SERPL-MCNC: 7.5 G/DL (ref 6.4–8.3)
RBC # BLD AUTO: 4.92 10E6/UL (ref 3.8–5.2)
SODIUM SERPL-SCNC: 137 MMOL/L (ref 135–145)
TROPONIN T SERPL HS-MCNC: <6 NG/L
WBC # BLD AUTO: 7.7 10E3/UL (ref 4–11)

## 2025-04-23 PROCEDURE — 93005 ELECTROCARDIOGRAM TRACING: CPT

## 2025-04-23 PROCEDURE — 84484 ASSAY OF TROPONIN QUANT: CPT | Performed by: STUDENT IN AN ORGANIZED HEALTH CARE EDUCATION/TRAINING PROGRAM

## 2025-04-23 PROCEDURE — 82550 ASSAY OF CK (CPK): CPT | Performed by: STUDENT IN AN ORGANIZED HEALTH CARE EDUCATION/TRAINING PROGRAM

## 2025-04-23 PROCEDURE — 85610 PROTHROMBIN TIME: CPT | Performed by: STUDENT IN AN ORGANIZED HEALTH CARE EDUCATION/TRAINING PROGRAM

## 2025-04-23 PROCEDURE — 70450 CT HEAD/BRAIN W/O DYE: CPT

## 2025-04-23 PROCEDURE — 82310 ASSAY OF CALCIUM: CPT | Performed by: STUDENT IN AN ORGANIZED HEALTH CARE EDUCATION/TRAINING PROGRAM

## 2025-04-23 PROCEDURE — 36415 COLL VENOUS BLD VENIPUNCTURE: CPT | Performed by: STUDENT IN AN ORGANIZED HEALTH CARE EDUCATION/TRAINING PROGRAM

## 2025-04-23 PROCEDURE — 250N000013 HC RX MED GY IP 250 OP 250 PS 637: Performed by: STUDENT IN AN ORGANIZED HEALTH CARE EDUCATION/TRAINING PROGRAM

## 2025-04-23 PROCEDURE — 99285 EMERGENCY DEPT VISIT HI MDM: CPT | Mod: 25

## 2025-04-23 PROCEDURE — 93010 ELECTROCARDIOGRAM REPORT: CPT | Performed by: STUDENT IN AN ORGANIZED HEALTH CARE EDUCATION/TRAINING PROGRAM

## 2025-04-23 PROCEDURE — 99285 EMERGENCY DEPT VISIT HI MDM: CPT | Performed by: STUDENT IN AN ORGANIZED HEALTH CARE EDUCATION/TRAINING PROGRAM

## 2025-04-23 PROCEDURE — 72125 CT NECK SPINE W/O DYE: CPT

## 2025-04-23 PROCEDURE — 71046 X-RAY EXAM CHEST 2 VIEWS: CPT

## 2025-04-23 PROCEDURE — 85025 COMPLETE CBC W/AUTO DIFF WBC: CPT | Performed by: STUDENT IN AN ORGANIZED HEALTH CARE EDUCATION/TRAINING PROGRAM

## 2025-04-23 RX ORDER — HYDRALAZINE HYDROCHLORIDE 10 MG/1
10 TABLET, FILM COATED ORAL 4 TIMES DAILY PRN
Status: DISCONTINUED | OUTPATIENT
Start: 2025-04-23 | End: 2025-04-25

## 2025-04-23 RX ORDER — ATORVASTATIN CALCIUM 40 MG/1
40 TABLET, FILM COATED ORAL EVERY EVENING
Status: DISCONTINUED | OUTPATIENT
Start: 2025-04-23 | End: 2025-04-28 | Stop reason: HOSPADM

## 2025-04-23 RX ORDER — ATORVASTATIN CALCIUM 40 MG/1
40 TABLET, FILM COATED ORAL EVERY EVENING
Status: DISCONTINUED | OUTPATIENT
Start: 2025-04-23 | End: 2025-04-23

## 2025-04-23 RX ADMIN — HYDRALAZINE HYDROCHLORIDE 10 MG: 10 TABLET ORAL at 20:51

## 2025-04-23 RX ADMIN — ATORVASTATIN CALCIUM 40 MG: 40 TABLET, FILM COATED ORAL at 20:51

## 2025-04-23 RX ADMIN — LOSARTAN POTASSIUM 12.5 MG: 25 TABLET, FILM COATED ORAL at 20:51

## 2025-04-23 ASSESSMENT — ACTIVITIES OF DAILY LIVING (ADL)
ADLS_ACUITY_SCORE: 58

## 2025-04-23 ASSESSMENT — COLUMBIA-SUICIDE SEVERITY RATING SCALE - C-SSRS
1. IN THE PAST MONTH, HAVE YOU WISHED YOU WERE DEAD OR WISHED YOU COULD GO TO SLEEP AND NOT WAKE UP?: NO
2. HAVE YOU ACTUALLY HAD ANY THOUGHTS OF KILLING YOURSELF IN THE PAST MONTH?: NO
6. HAVE YOU EVER DONE ANYTHING, STARTED TO DO ANYTHING, OR PREPARED TO DO ANYTHING TO END YOUR LIFE?: NO

## 2025-04-24 ENCOUNTER — APPOINTMENT (OUTPATIENT)
Dept: PHYSICAL THERAPY | Facility: CLINIC | Age: 75
End: 2025-04-24
Attending: STUDENT IN AN ORGANIZED HEALTH CARE EDUCATION/TRAINING PROGRAM
Payer: COMMERCIAL

## 2025-04-24 VITALS
DIASTOLIC BLOOD PRESSURE: 76 MMHG | BODY MASS INDEX: 25.53 KG/M2 | OXYGEN SATURATION: 99 % | WEIGHT: 163 LBS | HEART RATE: 80 BPM | RESPIRATION RATE: 17 BRPM | TEMPERATURE: 98.8 F | SYSTOLIC BLOOD PRESSURE: 130 MMHG

## 2025-04-24 LAB
ATRIAL RATE - MUSE: 68 BPM
DIASTOLIC BLOOD PRESSURE - MUSE: NORMAL MMHG
EST. AVERAGE GLUCOSE BLD GHB EST-MCNC: 123 MG/DL
FOLATE SERPL-MCNC: 8.4 NG/ML (ref 4.6–34.8)
HBA1C MFR BLD: 5.9 %
HOLD SPECIMEN: NORMAL
INTERPRETATION ECG - MUSE: NORMAL
P AXIS - MUSE: -15 DEGREES
POTASSIUM SERPL-SCNC: 3.8 MMOL/L (ref 3.4–5.3)
PR INTERVAL - MUSE: 152 MS
QRS DURATION - MUSE: 78 MS
QT - MUSE: 392 MS
QTC - MUSE: 416 MS
R AXIS - MUSE: -2 DEGREES
SYSTOLIC BLOOD PRESSURE - MUSE: NORMAL MMHG
T AXIS - MUSE: 1 DEGREES
VENTRICULAR RATE- MUSE: 68 BPM
VIT B12 SERPL-MCNC: 605 PG/ML (ref 232–1245)

## 2025-04-24 PROCEDURE — 83036 HEMOGLOBIN GLYCOSYLATED A1C: CPT

## 2025-04-24 PROCEDURE — 99223 1ST HOSP IP/OBS HIGH 75: CPT | Performed by: STUDENT IN AN ORGANIZED HEALTH CARE EDUCATION/TRAINING PROGRAM

## 2025-04-24 PROCEDURE — 36415 COLL VENOUS BLD VENIPUNCTURE: CPT

## 2025-04-24 PROCEDURE — 86334 IMMUNOFIX E-PHORESIS SERUM: CPT | Performed by: PATHOLOGY

## 2025-04-24 PROCEDURE — G0378 HOSPITAL OBSERVATION PER HR: HCPCS

## 2025-04-24 PROCEDURE — 99223 1ST HOSP IP/OBS HIGH 75: CPT | Mod: FS

## 2025-04-24 PROCEDURE — 84132 ASSAY OF SERUM POTASSIUM: CPT | Performed by: STUDENT IN AN ORGANIZED HEALTH CARE EDUCATION/TRAINING PROGRAM

## 2025-04-24 PROCEDURE — 250N000013 HC RX MED GY IP 250 OP 250 PS 637: Performed by: STUDENT IN AN ORGANIZED HEALTH CARE EDUCATION/TRAINING PROGRAM

## 2025-04-24 PROCEDURE — 250N000009 HC RX 250: Performed by: STUDENT IN AN ORGANIZED HEALTH CARE EDUCATION/TRAINING PROGRAM

## 2025-04-24 PROCEDURE — 82607 VITAMIN B-12: CPT

## 2025-04-24 PROCEDURE — 97116 GAIT TRAINING THERAPY: CPT | Mod: GP

## 2025-04-24 PROCEDURE — 97530 THERAPEUTIC ACTIVITIES: CPT | Mod: GP

## 2025-04-24 PROCEDURE — 82746 ASSAY OF FOLIC ACID SERUM: CPT

## 2025-04-24 PROCEDURE — 97161 PT EVAL LOW COMPLEX 20 MIN: CPT | Mod: GP

## 2025-04-24 PROCEDURE — 86334 IMMUNOFIX E-PHORESIS SERUM: CPT | Mod: 26 | Performed by: PATHOLOGY

## 2025-04-24 PROCEDURE — 36415 COLL VENOUS BLD VENIPUNCTURE: CPT | Performed by: STUDENT IN AN ORGANIZED HEALTH CARE EDUCATION/TRAINING PROGRAM

## 2025-04-24 PROCEDURE — 250N000013 HC RX MED GY IP 250 OP 250 PS 637: Performed by: INTERNAL MEDICINE

## 2025-04-24 RX ORDER — AMOXICILLIN 250 MG
2 CAPSULE ORAL 2 TIMES DAILY PRN
Status: DISCONTINUED | OUTPATIENT
Start: 2025-04-24 | End: 2025-04-28 | Stop reason: HOSPADM

## 2025-04-24 RX ORDER — PROCHLORPERAZINE MALEATE 5 MG/1
5 TABLET ORAL EVERY 6 HOURS PRN
Status: DISCONTINUED | OUTPATIENT
Start: 2025-04-24 | End: 2025-04-28 | Stop reason: HOSPADM

## 2025-04-24 RX ORDER — POTASSIUM CHLORIDE 1500 MG/1
20 TABLET, EXTENDED RELEASE ORAL ONCE
Status: COMPLETED | OUTPATIENT
Start: 2025-04-24 | End: 2025-04-24

## 2025-04-24 RX ORDER — ONDANSETRON 2 MG/ML
4 INJECTION INTRAMUSCULAR; INTRAVENOUS EVERY 6 HOURS PRN
Status: DISCONTINUED | OUTPATIENT
Start: 2025-04-24 | End: 2025-04-28 | Stop reason: HOSPADM

## 2025-04-24 RX ORDER — BRIMONIDINE TARTRATE 2 MG/ML
1 SOLUTION/ DROPS OPHTHALMIC 3 TIMES DAILY
Status: DISCONTINUED | OUTPATIENT
Start: 2025-04-24 | End: 2025-04-28 | Stop reason: HOSPADM

## 2025-04-24 RX ORDER — DORZOLAMIDE HYDROCHLORIDE AND TIMOLOL MALEATE 20; 5 MG/ML; MG/ML
1 SOLUTION/ DROPS OPHTHALMIC 2 TIMES DAILY
Status: DISCONTINUED | OUTPATIENT
Start: 2025-04-24 | End: 2025-04-28 | Stop reason: HOSPADM

## 2025-04-24 RX ORDER — AMOXICILLIN 250 MG
1 CAPSULE ORAL 2 TIMES DAILY PRN
Status: DISCONTINUED | OUTPATIENT
Start: 2025-04-24 | End: 2025-04-28 | Stop reason: HOSPADM

## 2025-04-24 RX ORDER — ONDANSETRON 4 MG/1
4 TABLET, ORALLY DISINTEGRATING ORAL EVERY 6 HOURS PRN
Status: DISCONTINUED | OUTPATIENT
Start: 2025-04-24 | End: 2025-04-28 | Stop reason: HOSPADM

## 2025-04-24 RX ORDER — LATANOPROST 50 UG/ML
1 SOLUTION/ DROPS OPHTHALMIC AT BEDTIME
Status: DISCONTINUED | OUTPATIENT
Start: 2025-04-24 | End: 2025-04-28 | Stop reason: HOSPADM

## 2025-04-24 RX ADMIN — BRIMONIDINE TARTRATE 1 DROP: 2 SOLUTION/ DROPS OPHTHALMIC at 13:47

## 2025-04-24 RX ADMIN — BRIMONIDINE TARTRATE 1 DROP: 2 SOLUTION/ DROPS OPHTHALMIC at 21:23

## 2025-04-24 RX ADMIN — DORZOLAMIDE HYDROCHLORIDE AND TIMOLOL MALEATE 1 DROP: 20; 5 SOLUTION OPHTHALMIC at 08:55

## 2025-04-24 RX ADMIN — DORZOLAMIDE HYDROCHLORIDE AND TIMOLOL MALEATE 1 DROP: 20; 5 SOLUTION OPHTHALMIC at 21:23

## 2025-04-24 RX ADMIN — POTASSIUM CHLORIDE 20 MEQ: 1500 TABLET, EXTENDED RELEASE ORAL at 13:47

## 2025-04-24 RX ADMIN — LATANOPROST 1 DROP: 50 SOLUTION OPHTHALMIC at 21:23

## 2025-04-24 RX ADMIN — ATORVASTATIN CALCIUM 40 MG: 40 TABLET, FILM COATED ORAL at 21:24

## 2025-04-24 RX ADMIN — BRIMONIDINE TARTRATE 1 DROP: 2 SOLUTION/ DROPS OPHTHALMIC at 08:54

## 2025-04-24 RX ADMIN — LATANOPROST 1 DROP: 50 SOLUTION OPHTHALMIC at 02:37

## 2025-04-24 RX ADMIN — LOSARTAN POTASSIUM 12.5 MG: 25 TABLET, FILM COATED ORAL at 08:55

## 2025-04-24 ASSESSMENT — ACTIVITIES OF DAILY LIVING (ADL)
ADLS_ACUITY_SCORE: 60
ADLS_ACUITY_SCORE: 69
ADLS_ACUITY_SCORE: 60
ADLS_ACUITY_SCORE: 58
ADLS_ACUITY_SCORE: 69
ADLS_ACUITY_SCORE: 60
ADLS_ACUITY_SCORE: 69
ADLS_ACUITY_SCORE: 60
ADLS_ACUITY_SCORE: 69
ADLS_ACUITY_SCORE: 69
ADLS_ACUITY_SCORE: 58
ADLS_ACUITY_SCORE: 60
ADLS_ACUITY_SCORE: 69
ADLS_ACUITY_SCORE: 60
ADLS_ACUITY_SCORE: 60
ADLS_ACUITY_SCORE: 69

## 2025-04-24 NOTE — ED NOTES
Patient was just brought by W/C to the bathroom, patient was able to void but missed the hat so no urine was able to be collected at this time. Provider notified.

## 2025-04-24 NOTE — CONSULTS
"[facility]  Neurology Consultation    Patient Name:  Rolanda Molina  MRN:  [id number]    :  1950  Date of Service:  2025  Primary care provider:  No Ref-Primary, Physician      Neurology consultation service was asked to see Rolanda Molina by Dr. Jordan to evaluate possible NPH.    Chief Complaint:  possible NPH    History of Present Illness:   Rolanda Molina is a 74 year old female with history of hypertension, 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     Patient states that she was attempting to get out of bed and slid on side of it, falling to ground. She lives alone and tells me that she happened to be on the phone with her daughter who got her help (unsure if this is true). Per ED note, \"Her daughter reports however that they have been attempting to call her since yesterday and no one has been able to get in touch with her at all for the last day. They went to her house and found her on the ground next to her bed, and she was soaked in urine, and seem like she had been on the ground for quite a while longer.\" Tells me she has been walking with walker for about a year due to weakness. Denies any falls besides this one, numbness/tingling of extremities.     Previous neurology consult by Dr. Jason on 10/2024 for concern of possible NPH noted on CTH. He recommended non-urgent MR Brain and high volume LP. MR Brain completed which showed several asymptomatic incidental multifocal strokes (suspect embolic etiology), stable ventriculomegaly, possible NPH. Family requested to follow up in clinic first before deciding whether to pursue LP. Patient did not follow up in clinic.     ROS  A comprehensive ROS was performed and pertinent findings were included in HPI.     PMH  Past Medical History:   Diagnosis Date    Hypertension      Past Surgical History:   Procedure Laterality Date    CATARACT IOL, " RT/LT      COLONOSCOPY N/A 08/30/2021    Procedure: Colonoscopy, With Polypectomy And Biopsy;  Surgeon: Federico Vital MD;  Location: [facility] GI    PHACOEMULSIFICATION WITH STANDARD INTRAOCULAR LENS IMPLANT Left 12/23/2024    Procedure: LEFT EYE COMPLEX PHACOEMULSIFICATION, CATARACT, WITH STANDARD INTRAOCULAR LENS IMPLANT INSERTION;  Surgeon: Ashu Grey MD;  Location: [facility] OR       Medications   I have personally reviewed the patient's medication list.     Allergies  I have personally reviewed the patient's allergy list.       Physical Examination   Vitals: BP (!) 168/98   Pulse 95   Temp 98.1  F (36.7  C) (Oral)   Resp 19   Wt 73.9 kg (163 lb)   LMP  (LMP Unknown)   SpO2 100%   BMI 25.53 kg/m    General: Lying in bed, NAD  Head: NC/AT  Eyes: no icterus, op pink and moist  Cardiac: Extremities warm, no edema.   Respiratory: non-labored on RA  Skin: No rash or lesion on exposed skin  Psych: Mood pleasant, affect congruent  Neuro:  Mental status: Awake, alert, attentive, oriented to self, time, place, and circumstance. Language is fluent and coherent with intact comprehension of complex commands, naming and repetition. Able to calculate quarters in $1.75. Able to state days of week backwards. Can name current president. 1/3 delayed recall.   Cranial nerves: VFF, PERRL, conjugate gaze, EOMI, facial sensation intact, face symmetric, shoulder shrug strong, tongue/uvula midline, no dysarthria.   Motor: Normal bulk and tone. No abnormal movements. 4/5 strength bilaterally in deltoids, 5/5 biceps, 5/5 triceps, 5/5 hand , 4/5 hip flexors, 4/5 knee flexion, 4/5 knee extension, 5/5 plantarflexion, 5/5 dorsiflexion.   Reflexes: Normo-reflexic and symmetric biceps, brachioradialis, triceps, patellae, and achilles. Negative Waters, no clonus, toes down-going.  Sensory: Intact to light touch and pin prick in proximal and distal aspects of all 4 extremities. Impaired proprioception at bilateral great toes.    Coordination: FNF without ataxia or dysmetria.   Gait: Trouble with standing, needed two assist. Wide based stance while using walker as support.     Investigations   I have personally reviewed pertinent labs, tests, and radiological imaging. Discussion of notable findings is included under Impression.     Was patient transferred from outside hospital?   No    Impression  #Recent fall  #Moderate ventriculomegaly, suspect ex-vacuo     Rolanda Molina is a 74 year old female with history of hypertension, 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 Neurology consulted for concern of NPH. She was previously seen by my colleague, Dr. Jason in 10/2024 for same question. MR Brain was completed at that time with stable moderate ventriculomegaly. Patient had CTH completed yesterday which noted stable mild to moderately dilated lateral and third ventricles with mild cerebral parenchymal volume loss.     I was unable to assess her gait on interview today therefore unable to formally answer the question of whether she has symptoms of NPH. She didn't have major cognitive deficits that I could point out either just difficulty with delayed recall. She hasn't had recurring falls therefore I feel it would be best to hold off on LP and have her follow in the outpatient setting. I will note that she does likely have a peripheral neuropathy that can be contributing to gait instability. Will check B12, Folate, A1C, immunofixation. While in hospital, would like patient to have PT/OT evaluation to ensure safety returning home.     Recommendations  -B12, Folate, A1C, Immunofixation ordered for you to further evaluate peripheral neuropathy  -PT/OT evaluation  -Please place updated referral for outpatient neurology  -Will sign off    Thank you for involving Neurology in the care of Rolanda Molina.  Please do not hesitate to call with questions/concerns (consult pager [phone]).       Patient was seen and discussed with Dr. Flynn.    Geovanna Coppola PA-C    75 MINUTES SPENT BY ME on the date of service doing chart review, history, exam, documentation & further activities per the note.

## 2025-04-24 NOTE — PHARMACY-ADMISSION MEDICATION HISTORY
Pharmacist Admission Medication History    Admission medication history is complete. The information provided in this note is only as accurate as the sources available at the time of the update.    Information Source(s): Patient and CareEverywhere/SureScripts via in-person    Pertinent Information:   Patient reports taking 3 different eye drops. Did not know the names but was able to state the colors. Patient states that the different colors are blue, purple and green. Brimonidine is likely the purple eye drop, dorzolamide-timolol is likely the blue eye drop and latanoprost is likely the green eye drop. She reports taking the green one right before she goes to bed. Patient also states she has another eye drop in her fridge but she does not take this anymore. Per surescripts fill history, patient filled prednisolone acetate 1% on 1/9/25 for a 50 day supply. This is usually supplied with a pink or white cap.    Changes made to PTA medication list:  Added: None  Deleted:   Asprin (per patinet)   Atorvastatin (per patient and no surescripts fill history)  Calcium carbonate   Flurometholone   Ibuprofen   Lidocaine cream   Lidocaine 1% solution   Ondansetron   Senna-docusate  Changed: None    Allergies reviewed with patient and updates made in EHR: yes    Medication History Completed By: Bay Urias PharmD 4/24/2025 11:34 AM    PTA Med List   Medication Sig Last Dose/Taking    brimonidine (ALPHAGAN) 0.2 % ophthalmic solution Place 1 drop into the right eye 3 times daily. 4/23/2025    dorzolamide-timolol (COSOPT) 2-0.5 % ophthalmic solution Place 1 drop into both eyes 2 times daily. 4/23/2025    hydrALAZINE (APRESOLINE) 10 MG tablet TAKE 1 TABLET BY MOUTH EVERY 4 HOURS AS NEEDED FOR HIGH BLOOD PRESSURE Past Month    latanoprost (XALATAN) 0.005 % ophthalmic solution Place 1 drop into the right eye at bedtime. 4/23/2025    losartan (COZAAR) 25 MG tablet Take 0.5 tablets (12.5 mg) by mouth daily. 4/23/2025    prednisoLONE  acetate (PRED FORTE) 1 % ophthalmic suspension Place 1 drop Into the left eye 4 times daily. To operative eye Past Month

## 2025-04-24 NOTE — ED TRIAGE NOTES
Pt had an unwitnessed fall sometime today per pt when she was trying to get into bed and states she slipped onto the floor and then laid on the ground for 10 mins until her family came to her house and had maintenance break the chain into her home.  Pt states she ate breakfast and lunch today and thinks the fall was after that. Per daughter pt was last known well yesterday, a family member did try to get a hold of the pt last night, and no one could get a hold of her today until the niece went to the house PTA.     Pt has had been out of her blood pressure meds for 2 months and daughter states BP always run high. Pt denies pain.  Oriented to all questions with exception to date and day of the week. Pt denies pain, head injury or LOC.      Triage Assessment (Adult)       Row Name 04/23/25 0084          Triage Assessment    Airway WDL WDL        Respiratory WDL    Respiratory WDL WDL        Cardiac WDL    Cardiac WDL WDL        Peripheral/Neurovascular WDL    Peripheral Neurovascular WDL WDL        Cognitive/Neuro/Behavioral WDL    Cognitive/Neuro/Behavioral WDL WDL

## 2025-04-24 NOTE — PLAN OF CARE
Goal Outcome Evaluation:     Plan of Care Reviewed With: patient     Overall Patient Progress: improvingOverall      Shift: 2241-3084     VS: /70   Pulse 84   Temp 97.5  F (36.4  C) (Oral)   Resp 19   Wt 73.9 kg (163 lb)   LMP  (LMP Unknown)   SpO2 96%   BMI 25.53 kg/m       Pain:Patient denies pain  Neuro: A&Ox3; intermittent confusion. Able to make needs known. Denies n/t. Right eye blindness.   Cardiac: WDL  Respiratory: WDL  Diet/Appetite:  Regular. Good appetite   LDA's: No lines   Activity: Ax1 with walker and gait belt. Bed alarm in place for safety     GI/: Continent of bowel and bladder

## 2025-04-24 NOTE — ED PROVIDER NOTES
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    Patient presents with her daughter who gives further collateral history.  According to the patient, she believes that she fell out of her bed around 4 PM today, was only on the ground for about 10 or 15 minutes.  Her daughter reports however that they have been attempting to call her since yesterday and no one has been able to get in touch with her at all for the last day.  They went to her house and found her on the ground next to her bed, and she was soaked in urine, and seem like she had been on the ground for quite a while longer.  They felt like she was confused, did not really know what was going on, or what had been happening, and wanted her to be evaluated further.    Here she denies any pain, no head pain, dizziness, no chest pain, shortness of breath, nausea or vomiting.    Her daughter does note that her primary care doctor retired a couple of months ago, and she has not had any of her medications for the last couple of months as her prescription ran out about a month ago.  She is supposed to take antihypertensives but has not been taking them due to this. No blood thinners    Physical Exam   BP: (!) 194/124 (R arm)  Pulse: 75  Temp: 97.4  F (36.3  C)  Resp: 16  Weight: 73.9 kg (163 lb)  SpO2: 95 %  Physical Exam  GEN: Well appearing, non toxic, cooperative  HEENT: normocephalic and atraumatic, PERRLA, EOMI, red injected conjunctive on the right eye  CV: well-perfused, normal skin color for ethnicity, regular rate and  rhythm, no murmurs rubs or gallops  PULM: breathing comfortably, in no respiratory distress, clear to auscultation upper and lower lung fields, no chest wall tenderness to palpation  ABD: nondistended, nontender, negative Rush, negative Nate point tenderness, no ecchymosis  Back: No cervical, thoracic or lumbar spinal tenderness.  No step-offs or deformities  EXT: Full range of motion.  No edema.  NEURO: awake, conversant, grossly normal bilateral upper and lower extremity strength & ROM.  Oriented to self, place, year, date, and situation  SKIN: No rashes, ecchymosis, or lacerations  PSYCH: Calm and cooperative, interactive      ED Course, Procedures, & Data      Procedures            EKG Interpretation:      Interpreted by Lucila Marrufo MD  Time reviewed: 2002  Symptoms at time of EKG: none   Rhythm: normal sinus   Rate: normal  Axis: normal  Ectopy: none  Conduction: normal  ST Segments/ T Waves: No ST-T wave changes  Q Waves: none  Comparison to prior: Unchanged    Clinical Impression: normal EKG     Results for orders placed or performed during the hospital encounter of 04/23/25   CT Head w/o Contrast     Status: None    Narrative    EXAM: CT HEAD W/O CONTRAST  LOCATION: [facility]  DATE: 4/23/2025    INDICATION: fall, AMS, HTN  COMPARISON: Head and neck CT angiogram 10/20/2024. Brain MRI 10/20/2024  TECHNIQUE: Routine CT Head without IV contrast. Multiplanar reformats. Dose reduction techniques were used.    FINDINGS:  INTRACRANIAL CONTENTS: No intracranial hemorrhage. Mild diffuse cerebral parenchymal volume loss. No midline shift. The basilar cisterns are patent. Small chronic infarcts involving the bilateral corona radiata and basal ganglia. Stable mild to   moderately dilated lateral and third ventricles. There is crowding of the vertex. The patient's known small extra-axial mass lesion involving the left frontal convexity is better seen on the prior brain  MRI. No CT evidence for an acute infarct.    VISUALIZED ORBITS/SINUSES/MASTOIDS: Prior left cataract surgery. Visualized portions of the orbits are otherwise unremarkable. Mild chronic mucosal thickening of the left maxillary sinus. No middle ear or mastoid effusion.    BONES/SOFT TISSUES: No acute abnormality.      Impression    IMPRESSION:  1.  No intracranial hemorrhage, intra-axial mass lesions, or CT evidence for an acute infarct.  2.  Mild diffuse cerebral parenchymal volume loss. Presumed chronic hypertensive/microvascular ischemic white matter changes.  3.  Stable mild to moderately dilated lateral and third ventricles. There is crowding of the vertex. Please correlate with clinical findings of normal pressure hydrocephalus.  4.  The patient's known small extra-axial mass lesion involving the left frontal convexity is better seen on the prior brain MRI.     CT Cervical Spine w/o Contrast     Status: None    Narrative    EXAM: CT CERVICAL SPINE W/O CONTRAST  LOCATION: [facility]  DATE: 4/23/2025    INDICATION: Fall, unknown head strike, AMS  COMPARISON: None.  TECHNIQUE: Routine CT Cervical Spine without IV contrast. Multiplanar reformats. Dose reduction techniques were used.    FINDINGS:  VERTEBRA: Reversal of the usual cervical lordosis. Flowing ventral osteophytes with ankylosis from C4 to C6. No acute compression fracture or posttraumatic subluxation.     CANAL/FORAMINA: Multilevel spondylosis without high grade canal stenosis.    PARASPINAL: No prevertebral edema.      Impression    IMPRESSION:  1.  No acute cervical spine fracture.     XR Chest 2 Views     Status: None    Narrative    EXAM: XR CHEST 2 VIEWS  LOCATION: [facility]  DATE: 4/23/2025    INDICATION: HTN, fall  COMPARISON: Chest radiograph 10/18/2024.      Impression    IMPRESSION: Poor inspiration with bronchovascular crowding. No focal consolidation,  pleural effusion or pneumothorax. Prominent heart size is likely due to low lung volumes. No displaced rib fracture.   INR     Status: Normal   Result Value Ref Range    INR 0.98 0.85 - 1.15   Comprehensive metabolic panel     Status: Abnormal   Result Value Ref Range    Sodium 137 135 - 145 mmol/L    Potassium 3.3 (L) 3.4 - 5.3 mmol/L    Carbon Dioxide (CO2) 24 22 - 29 mmol/L    Anion Gap 11 7 - 15 mmol/L    Urea Nitrogen 11.4 8.0 - 23.0 mg/dL    Creatinine 0.83 0.51 - 0.95 mg/dL    GFR Estimate 73 >60 mL/min/1.73m2    Calcium 9.4 8.8 - 10.4 mg/dL    Chloride 102 98 - 107 mmol/L    Glucose 99 70 - 99 mg/dL    Alkaline Phosphatase 84 40 - 150 U/L    AST 20 0 - 45 U/L    ALT 10 0 - 50 U/L    Protein Total 7.5 6.4 - 8.3 g/dL    Albumin 4.4 3.5 - 5.2 g/dL    Bilirubin Total 0.4 <=1.2 mg/dL   Troponin T, High Sensitivity     Status: Normal   Result Value Ref Range    Troponin T, High Sensitivity <6 <=14 ng/L   CK total     Status: Abnormal   Result Value Ref Range     (H) 26 - 192 U/L   CBC with platelets and differential     Status: Abnormal   Result Value Ref Range    WBC Count 7.7 4.0 - 11.0 10e3/uL    RBC Count 4.92 3.80 - 5.20 10e6/uL    Hemoglobin 12.5 11.7 - 15.7 g/dL    Hematocrit 39.4 35.0 - 47.0 %    MCV 80 78 - 100 fL    MCH 25.4 (L) 26.5 - 33.0 pg    MCHC 31.7 31.5 - 36.5 g/dL    RDW 15.4 (H) 10.0 - 15.0 %    Platelet Count 194 150 - 450 10e3/uL    % Neutrophils 59 %    % Lymphocytes 32 %    % Monocytes 7 %    % Eosinophils 1 %    % Basophils 0 %    % Immature Granulocytes 0 %    NRBCs per 100 WBC 0 <1 /100    Absolute Neutrophils 4.5 1.6 - 8.3 10e3/uL    Absolute Lymphocytes 2.5 0.8 - 5.3 10e3/uL    Absolute Monocytes 0.6 0.0 - 1.3 10e3/uL    Absolute Eosinophils 0.1 0.0 - 0.7 10e3/uL    Absolute Basophils 0.0 0.0 - 0.2 10e3/uL    Absolute Immature Granulocytes 0.0 <=0.4 10e3/uL    Absolute NRBCs 0.0 10e3/uL   EKG 12 lead     Status: None (Preliminary result)   Result Value Ref Range    Systolic Blood  Pressure  mmHg    Diastolic Blood Pressure  mmHg    Ventricular Rate 68 BPM    Atrial Rate 68 BPM    CO Interval 152 ms    QRS Duration 78 ms     ms    QTc 416 ms    P Axis -15 degrees    R AXIS -2 degrees    T Axis 1 degrees    Interpretation ECG       Sinus rhythm  Minimal voltage criteria for LVH, may be normal variant  Cannot rule out Anterior infarct , age undetermined  Abnormal ECG     CBC with platelets differential     Status: Abnormal    Narrative    The following orders were created for panel order CBC with platelets differential.  Procedure                               Abnormality         Status                     ---------                               -----------         ------                     CBC with platelets and ...[[id number]]  Abnormal            Final result                 Please view results for these tests on the individual orders.     Medications   hydrALAZINE (APRESOLINE) tablet 10 mg (10 mg Oral $Given 4/23/25 2051)   atorvastatin (LIPITOR) tablet 40 mg (40 mg Oral $Given 4/23/25 2051)   losartan (COZAAR) half-tab 12.5 mg (12.5 mg Oral $Given 4/23/25 2051)     Labs Ordered and Resulted from Time of ED Arrival to Time of ED Departure   COMPREHENSIVE METABOLIC PANEL - Abnormal       Result Value    Sodium 137      Potassium 3.3 (*)     Carbon Dioxide (CO2) 24      Anion Gap 11      Urea Nitrogen 11.4      Creatinine 0.83      GFR Estimate 73      Calcium 9.4      Chloride 102      Glucose 99      Alkaline Phosphatase 84      AST 20      ALT 10      Protein Total 7.5      Albumin 4.4      Bilirubin Total 0.4     CK TOTAL - Abnormal     (*)    CBC WITH PLATELETS AND DIFFERENTIAL - Abnormal    WBC Count 7.7      RBC Count 4.92      Hemoglobin 12.5      Hematocrit 39.4      MCV 80      MCH 25.4 (*)     MCHC 31.7      RDW 15.4 (*)     Platelet Count 194      % Neutrophils 59      % Lymphocytes 32      % Monocytes 7      % Eosinophils 1      % Basophils 0      % Immature  Granulocytes 0      NRBCs per 100 WBC 0      Absolute Neutrophils 4.5      Absolute Lymphocytes 2.5      Absolute Monocytes 0.6      Absolute Eosinophils 0.1      Absolute Basophils 0.0      Absolute Immature Granulocytes 0.0      Absolute NRBCs 0.0     INR - Normal    INR 0.98     TROPONIN T, HIGH SENSITIVITY - Normal    Troponin T, High Sensitivity <6     ROUTINE UA WITH MICROSCOPIC REFLEX TO CULTURE     XR Chest 2 Views   Final Result   IMPRESSION: Poor inspiration with bronchovascular crowding. No focal consolidation, pleural effusion or pneumothorax. Prominent heart size is likely due to low lung volumes. No displaced rib fracture.      CT Cervical Spine w/o Contrast   Final Result   IMPRESSION:   1.  No acute cervical spine fracture.         CT Head w/o Contrast   Final Result   IMPRESSION:   1.  No intracranial hemorrhage, intra-axial mass lesions, or CT evidence for an acute infarct.   2.  Mild diffuse cerebral parenchymal volume loss. Presumed chronic hypertensive/microvascular ischemic white matter changes.   3.  Stable mild to moderately dilated lateral and third ventricles. There is crowding of the vertex. Please correlate with clinical findings of normal pressure hydrocephalus.   4.  The patient's known small extra-axial mass lesion involving the left frontal convexity is better seen on the prior brain MRI.                Critical care was not performed.     Medical Decision Making  The patient's presentation was of high complexity (an acute health issue posing potential threat to life or bodily function).    The patient's evaluation involved:  an assessment requiring an independent historian (see separate area of note for details)  review of external note(s) from 3+ sources (see separate area of note for details)  review of 3+ test result(s) ordered prior to this encounter (see separate area of note for details)  ordering and/or review of 3+ test(s) in this encounter (see separate area of note for  details)    The patient's management necessitated high risk (a decision regarding hospitalization).    Assessment & Plan    74-year-old female with a history of hypertension who lives alone, baseline right eye blindness in the setting of glaucoma presenting to the emergency department due to unwitnessed fall with an unknown downtime, concern for confusion and altered mental status for the family here noted to be persistently hypertensive at 180/105, but without any focal neurological deficits on evaluation, no obvious evidence of any significant traumatic injuries.    Here, she is alert, oriented x 5.  I do not see any evidence of focal neurological deficits.  Certainly is severely hypertensive, however will plan for giving her her baseline home medications, and hold on aggressive blood pressure lowering.  Plan to screen for blood work to ensure no evidence of any significant endorgan damage.  EKG without evidence of ischemia.    Etiology of the fall is a little bit unclear as she is not the most reliable historian.  Considered the possibility of syncope, mechanical fall.  She does report to slipping out of bed, but also reports only being on the ground for short amount of time which is not consistent with the way that she was found.  Pending urinalysis, chest x-ray, CT head and neck    12:59 AM no acute process evaluated so far to cause her symptoms.  Still awaiting urinalysis unfortunately did attempt to get a sample but she did not urinate into the hat.  CT does not demonstrate intracranial hemorrhage although she does have some evidence concerning for possible normal pressure hydrocephalus.  According to her daughter, her symptoms have been more acutely over the last 24 hours and she has not had any significant gradual memory deficits that she is aware of.  Does note that she normally has a shuffling gait however.  She continues to be too weak to stand on her own, however I do not see any evidence of any focal  neurological deficits with low suspicion of CVA.  Will admit to medicine pending further blood pressure management, and evaluation for altered mental status    I have reviewed the nursing notes. I have reviewed the findings, diagnosis, plan and need for follow up with the patient.    New Prescriptions    No medications on file       Final diagnoses:   Secondary hypertension   Weakness generalized   Fall at home, initial encounter       Lucila Marrufo MD  [facility] EMERGENCY DEPARTMENT  4/23/2025     Lucila Marrufo MD  04/24/25 0100

## 2025-04-24 NOTE — PROGRESS NOTES
[facility]                                                                                   OUTPATIENT PHYSICAL THERAPY    PLAN OF TREATMENT FOR OUTPATIENT REHABILITATION   Patient's Last Name, First Name, Rolanda Willis YOB: 1950   Provider's Name   [facility]   Medical Record No.  [id number]     Onset Date: 04/23/25 Start of Care Date: 04/24/25     Medical Diagnosis:  fall, hypertension               PT Diagnosis:  impaired functional mobility Certification Dates:  From: 04/24/25  To: 05/24/25       See note for plan of treatment, functional goals, and certification details.    I CERTIFY THE NEED FOR THESE SERVICES FURNISHED UNDER        THIS PLAN OF TREATMENT AND WHILE UNDER MY CARE (Physician co-signature of this document indicates review and certification of the therapy plan).              04/24/25 1500   Appointment Info   Signing Clinician's Name / Credentials (PT) Staci Yang DPT   Quick Adds   Quick Adds [facility] Auth & Certification   Living Environment   People in Home alone   Current Living Arrangements apartment;independent living facility  (senior apartment)   Home Accessibility no concerns  (elevator - not a long walk into apt)   Transportation Anticipated family or friend will provide  (dtr normally transports)   Living Environment Comments reports ILF but does not get assist from staff   Self-Care   Usual Activity Tolerance good  (use 4WW baseline)   Current Activity Tolerance fair  (slow today, feels weak)   Equipment Currently Used at Home walker, rolling;shower chair  (4WW)   Fall history within last six months yes   Number of times patient has fallen within last six months 1  (tangled up in sheets and fell out of bed)   Activity/Exercise/Self-Care Comment independent baseline, uses shower chair   General Information   Onset of Illness/Injury or Date of Surgery 04/23/25   Referring Physician Bill Jordan  MD MAHESH   Patient/Family Therapy Goals Statement (PT) to move a little faster and go back to apt   Pertinent History of Current Problem (include personal factors and/or comorbidities that impact the POC) Rolanda Molina is a 74 year old female admitted on 4/23/2025. 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   Existing Precautions/Restrictions fall   Weight-Bearing Status - LUE full weight-bearing   Weight-Bearing Status - RUE full weight-bearing   Weight-Bearing Status - LLE full weight-bearing   Weight-Bearing Status - RLE full weight-bearing   General Observations pt sup in bed resting on PT arrival, agreeable to PT   Cognition   Affect/Mental Status (Cognition) WFL   Pain Assessment   Patient Currently in Pain No   Integumentary/Edema   Integumentary/Edema no deficits were identifed   Posture    Posture Forward head position;Protracted shoulders   Range of Motion (ROM)   Range of Motion ROM is WFL   Strength (Manual Muscle Testing)   Strength (Manual Muscle Testing) Deficits observed during functional mobility   Bed Mobility   Comment, (Bed Mobility) CGA bed mob   Transfers   Comment, (Transfers) Grace transfer w/ FWW   Gait/Stairs (Locomotion)   Comment, (Gait/Stairs) CGA w/ FWW   Balance   Balance Comments Ax1 w/ FWW   Sensory Examination   Sensory Perception patient reports no sensory changes   Clinical Impression   Criteria for Skilled Therapeutic Intervention Yes, treatment indicated   PT Diagnosis (PT) impaired functional mobility   Influenced by the following impairments weakness, deconditioning, impaired activity tolerance   Functional limitations due to impairments impaired bed mob, transfers, amb   Clinical Presentation (PT Evaluation Complexity) stable   Clinical Presentation Rationale per clinical judgment   Clinical Decision Making (Complexity) low complexity   Planned Therapy Interventions (PT) balance  training;bed mobility training;gait training;home exercise program;patient/family education;strengthening;transfer training;progressive activity/exercise;risk factor education;home program guidelines   Risk & Benefits of therapy have been explained evaluation/treatment results reviewed;care plan/treatment goals reviewed;risks/benefits reviewed;current/potential barriers reviewed;participants voiced agreement with care plan;participants included;patient   Clinical Impression Comments Pt w/ need for Ax1 for bed mob, transfers, and CGAx1 w/ FWW for amb. Pt seemingly below functional baseline and d/t Ax1 needed pt not currently safe to DC back to own apt. Pt would benefit from TCU stay at this time, educated pt on role of TCU and prefers home but understanding of recs and states that she has has good experiences at TCUs prior.   PT Total Evaluation Time   PT Eval, Low Complexity Minutes (57463) 4   Therapy Certification   Start of care date 04/24/25   Certification date from 04/24/25   Certification date to 05/24/25   Medical Diagnosis fall, hypertension   [facility] Authorization Information   Condition type Initial onset (within last 3 months)   Cause of current episode Unspecified  (fall)   Nature of treatment Rehabilitative   Functional ability Moderate functional limitations   Documented POC (choose all that apply) Measurable short and long term/discharge treatment goals related to physical and functional deficits.;Frequency of treatment visits and treatment activities to address deficit areas.;Patient agrees to program participation including home program   Briefly describe symptoms weakness   How did the symptoms start fall out of bed   Last 24H: avg pain/symptom intensity  no pain   Past wk: avg pain/symptom intensity no pain   Frequency of Symptoms Intermittently (0-25% of the time)   Symptom impact on ADLs A little bit   Condition change since eval N/A (first visit)   General health reported by patient Very good    Physical Therapy Goals   PT Frequency 5x/week   PT Predicted Duration/Target Date for Goal Attainment 05/24/25   PT Goals Bed Mobility;Transfers;Gait   PT: Bed Mobility Independent;Supine to/from sit   PT: Transfers Modified independent;Sit to/from stand;Bed to/from chair;Assistive device   PT: Gait Modified independent;Assistive device;Rolling walker;Greater than 200 feet   Interventions   Interventions Quick Adds Gait Training;Therapeutic Activity   Therapeutic Activity   Therapeutic Activities: dynamic activities to improve functional performance Minutes (35478) 15   Symptoms Noted During/After Treatment Fatigue   Treatment Detail/Skilled Intervention pt supine in bed and agreeable to PT upon arrival. time taken for room set up/line management. pt completed supine to sit transfer w/ SBA. pt demonstrated transfer w/ excessive effort and requested assist. pt edu given that she would have to complete transfer w/o assist if she were to return home (therefore prompting completion w/ SBA only). pt completed STS w/ minAx1 and FWW, pt w/ initial posterior LOB needing Grace to correct. pt completed stand to sit at EOB w/ CGA d/t fatigue prior to additional completion of STS w/ same assist as first rep. upon completion of gait training, pt completed SPT to recliner w/ FWW and CGA. multiple VCs required for pivot (foot placement/proximity to chair). pt seated in chair upon PT exit w/ labs being taken and all needs met. nursing updated on assist level following session. Nursing updated no chair alarm present in room and okayed this.   Gait Training   Gait Training Minutes (09167) 14   Symptoms Noted During/After Treatment (Gait Training) fatigue   Treatment Detail/Skilled Intervention pt amb 15 feet in room w/ use of FWW and CGA. pt demonstrated significantly slow gait speed and decreased step length however required no rest break throughout. Pt occasionally ends up marching in place w/ no forward momentum to steps and  needs VC to clear feet and move forward.   Distance in Feet 15   PT Discharge Planning   PT Plan inc IND w/ bed mob (decrease VCs), transfers, amb   PT Discharge Recommendation (DC Rec) Transitional Care Facility   PT Rationale for DC Rec pt functioning below baseline, requiring SBA-minAx1 for functional mobility. pt would benefit from ongoing therapies and medical support of TCU at this time. pt lives alone and is unable to receive assist for functional mobility. Pt understanding of this rec currently and expresses that she has had good experiences at TCU prior, however, her goal remains home.   PT Brief overview of current status SBA bed mob, CGA-minAx1 transfers, CGA amb   PT Total Distance Amb During Session (feet) 15   PT Equipment Needed at Discharge   (may need FWW if progress to DC home)   Physical Therapy Time and Intention   Timed Code Treatment Minutes 29   Total Session Time (sum of timed and untimed services) 33

## 2025-04-24 NOTE — H&P
[facility]    History and Physical - Hospitalist Service, GOLD TEAM        Date of Admission:  4/23/2025    Assessment & Plan      Rolanda Molina is a 74 year old female admitted on 4/23/2025. 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    Fall  Unsteadiness  Intermittent confusion  Ventriculomegaly  Patient reports mechanical fall after slipping off side of bed at home.  Denies hitting her head.  CT head and C-spine without evidence of injury.  No clear syncopal symptoms however her inconsistent recollection of the timeline makes this assessment more challenging.  Denies headache, dizziness, chest pain.  Notably, CT head redemonstrates known ventriculomegaly.  Given her intermittent confusion and unsteadiness on feet, worth considering normal pressure hydrocephalus.  Patient denies incontinence, but was soaked with urine when she was found down (this may have been situational).  Will plan to discuss with neurology.  - Neurology consult  - PT/OT evaluations  - Up with assist    HTN  Previously on losartan and as needed hydralazine at home.  Patient and her daughter report that she stopped taking these potentially months ago after the [facility] of her PCP.  Severely hypertensive in ED but no evidence of endorgan damage and does not seem encephalopathic.   - Restart PTA losartan 12.5 mg daily  - Continue as needed oral hydralazine for now, however would suggest eliminating this and increasing her long-acting medication if further titration is needed outpatient    Hx CVA  During workup for confusion and fall 2024, MRI showed evidence of multifocal strokes suggestive of embolic disease.  She was started on aspirin and Plavix with plan for close neurology follow-up, however she was unable to get in for this and has not been taking aspirin or Plavix anytime recently.  No focal  neurological deficits on exam tonight.  - Neurology consult, will defer restarting antiplatelet therapy pending discussion  - Continue PTA atorvastatin    Hypokalemia  - Replacement protocol    Hx subacute angle-closure glaucoma  - Continue PTA eyedrops       Observation Goals: -diagnostic tests and consults completed and resulted, -vital signs normal or at patient baseline, -returns to baseline functional status, -safe disposition plan has been identified, Nurse to notify provider when observation goals have been met and patient is ready for discharge.  Diet: Regular Diet Adult    DVT Prophylaxis: Pneumatic Compression Devices  Ornelas Catheter: Not present  Lines: None     Cardiac Monitoring: ACTIVE order. Indication: Syncope- low cardiac risk (24 hours)  Code Status: No CPR- Pre-arrest intubation OK    Clinically Significant Risk Factors Present on Admission        # Hypokalemia: Lowest K = 3.3 mmol/L in last 2 days, will replace as needed          # Drug Induced Platelet Defect: home medication list includes an antiplatelet medication   # Hypertension: Noted on problem list               # Financial/Environmental Concerns:           Disposition Plan     Medically Ready for Discharge: Anticipated Tomorrow     Bill Jordan MD  Hospitalist Service, [facility]  Securely message with [employer] (more info)  Text page via [facility] Paging/Directory   See signed in provider for up to date coverage information  ______________________________________________________________________    Chief Complaint   Fall    History is obtained from the patient and her daughter    History of Present Illness   Rolanda Molina is a 74 year old female who has a history of hypertension, 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    Patient reports that sometime yesterday she slipped from her bed down to the floor and landed on her  bottom.  She denies any dizziness or lightheadedness; she attributes the fall to slippery sheets.  She thinks she was sitting on the ground for about 10 minutes before her granddaughter came by and helped her get up again.  She remembers then being brought straight to the hospital.  Denies any headache, chest pain, unsteadiness on feet, incontinence, difficulty breathing, abdominal pain.    Spoke with her daughter (Bushra) for collateral.  Her daughter reports that she had been unreachable by phone for the last 24 hours or so and ultimately patient's granddaughter came by her apartment and had to break in with the assistance of a .  She found Rolanda sitting down next to the bed soaked in urine.  Bushra suspected Rolanda was down a lot longer than 10 minutes.  Bushra reports that Rolanda is generally fairly sharp, however gets intermittently confused.  After they found her today, Bushra noted that she was having difficulty following simple instructions and seem to be very unsteady on her feet.  Ultimately that led them to present to the ED.    In the ED:  - Severely hypertensive though asymptomatic  - Restarted home losartan and hydralazine  - Labs do not suggest any end organ damage, EKG without evidence of ischemia  - CT head, C-spine atraumatic  - Too weak to stand on her own    Past Medical History    Past Medical History:   Diagnosis Date    Hypertension        Past Surgical History   Past Surgical History:   Procedure Laterality Date    CATARACT IOL, RT/LT      COLONOSCOPY N/A 08/30/2021    Procedure: Colonoscopy, With Polypectomy And Biopsy;  Surgeon: Federico Vital MD;  Location:  GI    PHACOEMULSIFICATION WITH STANDARD INTRAOCULAR LENS IMPLANT Left 12/23/2024    Procedure: LEFT EYE COMPLEX PHACOEMULSIFICATION, CATARACT, WITH STANDARD INTRAOCULAR LENS IMPLANT INSERTION;  Surgeon: Ashu Grey MD;  Location: [facility] OR       Prior to Admission Medications   Prior to Admission  Medications   Prescriptions Last Dose Informant Patient Reported? Taking?   aspirin (ASA) 81 MG chewable tablet   No No   Sig: Take 1 tablet (81 mg) by mouth daily.   atorvastatin (LIPITOR) 40 MG tablet   No No   Sig: Take 1 tablet (40 mg) by mouth every evening.   brimonidine (ALPHAGAN) 0.2 % ophthalmic solution   No No   Sig: Place 1 drop into the right eye 3 times daily.   calcium carbonate (TUMS) 500 MG chewable tablet   No No   Sig: Take 1 tablet (500 mg) by mouth 4 times daily as needed for heartburn.   clopidogrel (PLAVIX) 75 MG tablet   No No   Sig: Take 1 tablet (75 mg) by mouth daily for 16 days.   dorzolamide-timolol (COSOPT) 2-0.5 % ophthalmic solution   No No   Sig: Place 1 drop into both eyes 2 times daily.   fluorometholone (FML LIQUIFILM) 0.1 % ophthalmic suspension   No No   Sig: Place 1 drop into the right eye 2 times daily.   hydrALAZINE (APRESOLINE) 10 MG tablet   No No   Sig: TAKE 1 TABLET BY MOUTH EVERY 4 HOURS AS NEEDED FOR HIGH BLOOD PRESSURE   ibuprofen (ADVIL/MOTRIN) 200 MG capsule   Yes No   Sig: Take 400 mg by mouth every 6 hours as needed for moderate pain or mild pain.   latanoprost (XALATAN) 0.005 % ophthalmic solution   No No   Sig: Place 1 drop into the right eye at bedtime.   lidocaine (LMX4) 4 % external cream   No No   Sig: Apply topically every hour as needed for pain (with VAD insertion). Please dispense available cream if other options are avaialble.   lidocaine 1 % SOLN   No No   Si.1-1 mLs by Other route every hour as needed (mild pain with VAD insertion).   losartan (COZAAR) 25 MG tablet   No No   Sig: Take 0.5 tablets (12.5 mg) by mouth daily.   ondansetron (ZOFRAN ODT) 4 MG ODT tab   No No   Sig: Take 1 tablet (4 mg) by mouth every 6 hours as needed for nausea or vomiting.   ondansetron (ZOFRAN) 2 MG/ML injection   No No   Sig: Inject 2 mLs (4 mg) over 2-5 minutes into the vein every 6 hours as needed for nausea or vomiting. Ok to dispense other volumes   prednisoLONE  acetate (PRED FORTE) 1 % ophthalmic suspension   No No   Sig: Place 1 drop Into the left eye 4 times daily. To operative eye   senna-docusate (SENOKOT-S/PERICOLACE) 8.6-50 MG tablet   No No   Sig: Take 1 tablet by mouth 2 times daily as needed for constipation.   senna-docusate (SENOKOT-S/PERICOLACE) 8.6-50 MG tablet   No No   Sig: Take 2 tablets by mouth 2 times daily as needed for constipation.   sodium chloride, PF, 0.9% PF flush   No No   Sig: Inject 3 mLs into catheter every 8 hours.   sodium chloride, PF, 0.9% PF flush   No No   Sig: Inject 3 mLs into catheter every 10 minutes.      Facility-Administered Medications: None       Physical Exam   Vital Signs: Temp: 97.4  F (36.3  C) Temp src: Oral BP: (!) 176/113 Pulse: 93   Resp: 18 SpO2: 100 % O2 Device: None (Room air)    Weight: 163 lbs 0 oz    General: Appears comfortable, no apparent distress  HEENT: NC/AT, EOMI, right eye clouded over, left eye appears normal with 3 mm pupil, left pupil reactive, conjunctivae anicteric  CV: RRR, no murmurs, warm and well-perfused extremities, no edema  Resp: Breathing comfortably in room air, CTAB  Abd: Soft, nontender, nondistended  Extrem: No peterson musculoskeletal deformity  Derm: No rashes or concerning lesions on uncovered skin, no jaundice  Neuro: A&O, Cranial nerves grossly intact, moving all extremities, normal strength in plantarflexion and dorsiflexion, normal strength in bilateral handgrip, speech fluent  Psych: Appropriate affect, conversational      Medical Decision Making       75 MINUTES SPENT BY ME on the date of service doing chart review, history, exam, documentation & further activities per the note.      Data     I have personally reviewed the following data over the past 24 hrs:    7.7  \   12.5   / 194     137 102 11.4 /  99   3.3 (L) 24 0.83 \     ALT: 10 AST: 20 AP: 84 TBILI: 0.4   ALB: 4.4 TOT PROTEIN: 7.5 LIPASE: N/A     Trop: <6 BNP: N/A     INR:  0.98 PTT:  N/A   D-dimer:  N/A Fibrinogen:  N/A

## 2025-04-24 NOTE — ED NOTES
Patient's daughter is leaving, confirmed phone number is correct in the chart, would like a call when patient gets moved to the unit.

## 2025-04-24 NOTE — ED NOTES
Patient wanted daughter called with admission upstairs, was able to reach Bushra and update her on the unit and room number.

## 2025-04-24 NOTE — PROGRESS NOTES
See H & P from earlier today for the details.   Seen and evaluated. Dw family at bedside.   Doing better  Bp better.   Pending Neurology, PT, OT eval  No other acute concern.   CTM.     Vitals:    04/24/25 0130 04/24/25 0851 04/24/25 1038 04/24/25 1254   BP: (!) 168/98 (!) 158/93 121/70 (!) 141/84   BP Location:  Right arm  Right arm   Pulse: 95 99 84 75   Resp: 19 19 18   Temp: 98.1  F (36.7  C) 97.5  F (36.4  C)  98  F (36.7  C)   TempSrc: Oral Oral  Oral   SpO2: 100% 96%  100%   Weight:              CMP  Recent Labs   Lab 04/24/25  0759 04/23/25 2049   NA  --  137   POTASSIUM 3.8 3.3*   CHLORIDE  --  102   CO2  --  24   ANIONGAP  --  11   GLC  --  99   BUN  --  11.4   CR  --  0.83   GFRESTIMATED  --  73   DONNA  --  9.4   PROTTOTAL  --  7.5   ALBUMIN  --  4.4   BILITOTAL  --  0.4   ALKPHOS  --  84   AST  --  20   ALT  --  10     CBC  Recent Labs   Lab 04/23/25 2049   WBC 7.7   RBC 4.92   HGB 12.5   HCT 39.4   MCV 80   MCH 25.4*   MCHC 31.7   RDW 15.4*        INR  Recent Labs   Lab 04/23/25 2049   INR 0.98     Arterial Blood GasNo lab results found in last 7 days.

## 2025-04-24 NOTE — PROGRESS NOTES
6MS ADMISSION    D: Patient admitted/transferred from ED via stretcher for secondary hypertension and recent fall at home.     I: Upon arrival to the unit patient was oriented to room, unit, and call light. Patient s height, weight, and vital signs were obtained. Allergies reviewed and allergy band applied. Provider notified of patient s arrival on the unit. Adult AVS completed. Head to toe assessment completed. Education assessment completed. Care plan initiated.    A: Vital signs stable upon admission. Patient rates pain at /. Two RN skin assessment completed yes. Second RN was Staci DE LA GARZA Significant Skin Findings include no skin issues. [facility] Nurse Consult Ordered no.     P: Continue to monitor patient s symptoms and intervene as needed. Continue with plan of care. Notify provider with any concerns or changes in patient status.

## 2025-04-24 NOTE — ED NOTES
Still attempting IV with US at this time. Provider gave okay to give BP meds prior to CT, awaiting from pharmacy.

## 2025-04-25 ENCOUNTER — APPOINTMENT (OUTPATIENT)
Dept: OCCUPATIONAL THERAPY | Facility: CLINIC | Age: 75
End: 2025-04-25
Attending: STUDENT IN AN ORGANIZED HEALTH CARE EDUCATION/TRAINING PROGRAM
Payer: COMMERCIAL

## 2025-04-25 LAB
GLUCOSE BLDC GLUCOMTR-MCNC: 100 MG/DL (ref 70–99)
GLUCOSE BLDC GLUCOMTR-MCNC: 104 MG/DL (ref 70–99)
GLUCOSE BLDC GLUCOMTR-MCNC: 93 MG/DL (ref 70–99)
HOLD SPECIMEN: NORMAL
LOCATION OF TASK: NORMAL
POTASSIUM SERPL-SCNC: 4.2 MMOL/L (ref 3.4–5.3)
PROT PATTERN SERPL IFE-IMP: NORMAL

## 2025-04-25 PROCEDURE — 97165 OT EVAL LOW COMPLEX 30 MIN: CPT | Mod: GO

## 2025-04-25 PROCEDURE — 250N000013 HC RX MED GY IP 250 OP 250 PS 637: Performed by: STUDENT IN AN ORGANIZED HEALTH CARE EDUCATION/TRAINING PROGRAM

## 2025-04-25 PROCEDURE — G0378 HOSPITAL OBSERVATION PER HR: HCPCS

## 2025-04-25 PROCEDURE — 99207 PR NO BILLABLE SERVICE THIS VISIT: CPT | Performed by: STUDENT IN AN ORGANIZED HEALTH CARE EDUCATION/TRAINING PROGRAM

## 2025-04-25 PROCEDURE — 82962 GLUCOSE BLOOD TEST: CPT

## 2025-04-25 PROCEDURE — 99232 SBSQ HOSP IP/OBS MODERATE 35: CPT | Performed by: INTERNAL MEDICINE

## 2025-04-25 PROCEDURE — 250N000013 HC RX MED GY IP 250 OP 250 PS 637: Performed by: INTERNAL MEDICINE

## 2025-04-25 PROCEDURE — 84132 ASSAY OF SERUM POTASSIUM: CPT | Performed by: INTERNAL MEDICINE

## 2025-04-25 PROCEDURE — 97530 THERAPEUTIC ACTIVITIES: CPT | Mod: GO

## 2025-04-25 PROCEDURE — 36415 COLL VENOUS BLD VENIPUNCTURE: CPT | Performed by: INTERNAL MEDICINE

## 2025-04-25 RX ORDER — ASPIRIN 81 MG/1
81 TABLET ORAL DAILY
Status: DISCONTINUED | OUTPATIENT
Start: 2025-04-25 | End: 2025-04-28 | Stop reason: HOSPADM

## 2025-04-25 RX ORDER — HYDRALAZINE HYDROCHLORIDE 10 MG/1
10 TABLET, FILM COATED ORAL 4 TIMES DAILY PRN
Status: DISCONTINUED | OUTPATIENT
Start: 2025-04-25 | End: 2025-04-28 | Stop reason: HOSPADM

## 2025-04-25 RX ORDER — POTASSIUM CHLORIDE 1500 MG/1
20 TABLET, EXTENDED RELEASE ORAL ONCE
Status: COMPLETED | OUTPATIENT
Start: 2025-04-25 | End: 2025-04-25

## 2025-04-25 RX ADMIN — ASPIRIN 81 MG: 81 TABLET ORAL at 20:07

## 2025-04-25 RX ADMIN — BRIMONIDINE TARTRATE 1 DROP: 2 SOLUTION/ DROPS OPHTHALMIC at 20:07

## 2025-04-25 RX ADMIN — ATORVASTATIN CALCIUM 40 MG: 40 TABLET, FILM COATED ORAL at 20:07

## 2025-04-25 RX ADMIN — LOSARTAN POTASSIUM 12.5 MG: 25 TABLET, FILM COATED ORAL at 09:15

## 2025-04-25 RX ADMIN — BRIMONIDINE TARTRATE 1 DROP: 2 SOLUTION/ DROPS OPHTHALMIC at 14:19

## 2025-04-25 RX ADMIN — DORZOLAMIDE HYDROCHLORIDE AND TIMOLOL MALEATE 1 DROP: 20; 5 SOLUTION OPHTHALMIC at 20:07

## 2025-04-25 RX ADMIN — DORZOLAMIDE HYDROCHLORIDE AND TIMOLOL MALEATE 1 DROP: 20; 5 SOLUTION OPHTHALMIC at 09:16

## 2025-04-25 RX ADMIN — POTASSIUM CHLORIDE 20 MEQ: 1500 TABLET, EXTENDED RELEASE ORAL at 09:15

## 2025-04-25 RX ADMIN — BRIMONIDINE TARTRATE 1 DROP: 2 SOLUTION/ DROPS OPHTHALMIC at 09:16

## 2025-04-25 RX ADMIN — LATANOPROST 1 DROP: 50 SOLUTION OPHTHALMIC at 21:47

## 2025-04-25 ASSESSMENT — ACTIVITIES OF DAILY LIVING (ADL)
ADLS_ACUITY_SCORE: 69
PREVIOUS_RESPONSIBILITIES: MEAL PREP;HOUSEKEEPING;LAUNDRY;SHOPPING;MEDICATION MANAGEMENT;FINANCES
ADLS_ACUITY_SCORE: 76
ADLS_ACUITY_SCORE: 73
ADLS_ACUITY_SCORE: 73
ADLS_ACUITY_SCORE: 69
ADLS_ACUITY_SCORE: 73
ADLS_ACUITY_SCORE: 73
ADLS_ACUITY_SCORE: 69
ADLS_ACUITY_SCORE: 73
ADLS_ACUITY_SCORE: 68
ADLS_ACUITY_SCORE: 72
ADLS_ACUITY_SCORE: 73
ADLS_ACUITY_SCORE: 76
DEPENDENT_IADLS:: INDEPENDENT
ADLS_ACUITY_SCORE: 69
ADLS_ACUITY_SCORE: 76
ADLS_ACUITY_SCORE: 68
ADLS_ACUITY_SCORE: 72
ADLS_ACUITY_SCORE: 68
ADLS_ACUITY_SCORE: 72
ADLS_ACUITY_SCORE: 69
ADLS_ACUITY_SCORE: 72
ADLS_ACUITY_SCORE: 69
ADLS_ACUITY_SCORE: 73

## 2025-04-25 NOTE — PROGRESS NOTES
Patient is A&Ox3; intermittent confusion. Able to make needs known. Denies n/t. Right eye blindness  . Ax1 with walker and gait belt. Bed alarm in place for safety .Continent of bowel and bladder.  /76 (BP Location: Right arm)   Pulse 80   Temp 98.8  F (37.1  C) (Oral)   Resp 17   Wt 73.9 kg (163 lb)   LMP  (LMP Unknown)   SpO2 99%   BMI 25.53 kg/m  .

## 2025-04-25 NOTE — PROGRESS NOTES
10A ICU End of Shift Summary.     Changes this shift: Patient met with PT and      Neuro: Disoriented time. Intermittent confusion   Cardiac: NS     Respiratory: Room Air - clear      GI/: Incontinent last BM 4/24  Diet/appetite: Regular   Pain: Denies   Skin: No concerns   LDA's: Left PIV   Activities: ***    Drips:   ***       Plan: ***

## 2025-04-25 NOTE — CONSULTS
Care Management Initial Consult    General Information  Assessment completed with: Patient,    Type of CM/SW Visit: Initial Assessment    Primary Care Provider verified and updated as needed: Yes   Readmission within the last 30 days: no previous admission in last 30 days      Reason for Consult: discharge planning  Advance Care Planning: Advance Care Planning Reviewed: no concerns identified        Communication Assessment  Patient's communication style: spoken language (English or Bilingual)        Cognitive  Cognitive/Neuro/Behavioral: .WDL except, orientation, level of consciousness  Level of Consciousness: intermittent confusion     Orientation: disoriented to, time             Living Environment:   People in home: alone     Current living Arrangements: apartment      Able to return to prior arrangements: yes     Family/Social Support:  Care provided by: self  Provides care for: no one  Marital Status: Single  Support system: Sibling(s), Children          Description of Support System: Supportive, Involved    Support Assessment: Adequate family and caregiver support    Current Resources:   Patient receiving home care services: No     Community Resources:    Equipment currently used at home: walker, rolling, shower chair  Supplies currently used at home: None    Employment/Financial:  Employment Status: retired     Employment/ Comments: Not at   Financial Concerns: none   Referral to Financial Worker: No     Does the patient's insurance plan have a 3 day qualifying hospital stay waiver?  No    Lifestyle & Psychosocial Needs:  Social Drivers of Health     Food Insecurity: Low Risk  (4/24/2025)    Food Insecurity     Within the past 12 months, did you worry that your food would run out before you got money to buy more?: No     Within the past 12 months, did the food you bought just not last and you didn t have money to get more?: No   Depression: Not at risk (2/6/2025)    PHQ-2     PHQ-2 Score: 0    Housing Stability: Low Risk  (4/24/2025)    Housing Stability     Do you have housing? : Yes     Are you worried about losing your housing?: No   Tobacco Use: High Risk (4/23/2025)    Patient History     Smoking Tobacco Use: Every Day     Smokeless Tobacco Use: Never     Passive Exposure: Not on file   Financial Resource Strain: Low Risk  (4/24/2025)    Financial Resource Strain     Within the past 12 months, have you or your family members you live with been unable to get utilities (heat, electricity) when it was really needed?: No   Alcohol Use: Not on file   Transportation Needs: Low Risk  (4/24/2025)    Transportation Needs     Within the past 12 months, has lack of transportation kept you from medical appointments, getting your medicines, non-medical meetings or appointments, work, or from getting things that you need?: No   Physical Activity: Not on file   Interpersonal Safety: Low Risk  (12/23/2024)    Interpersonal Safety     Do you feel physically and emotionally safe where you currently live?: Yes     Within the past 12 months, have you been hit, slapped, kicked or otherwise physically hurt by someone?: No     Within the past 12 months, have you been humiliated or emotionally abused in other ways by your partner or ex-partner?: No   Stress: Not on file   Social Connections: Not on file   Health Literacy: Not on file     Functional Status:  Prior to admission patient needed assistance:   Dependent ADLs:: Ambulation-walker  Dependent IADLs:: Independent  Assesssment of Functional Status: Not at baseline with ADL Functioning    Mental Health Status:  Mental Health Status: No Current Concerns       Chemical Dependency Status:  Chemical Dependency Status: No Current Concerns           Values/Beliefs:  Spiritual, Cultural Beliefs, Uatsdin Practices, Values that affect care: no             Discussed  Partnership in Safe Discharge Planning  document with patient/family: No    Additional Information:  Per H&P,  patient is a 74 year old female admitted on 4/23/2025. 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     Writer completed initial assessment due to recommendations for TCU at discharge. Writer met with patient to discuss these recommendations and patient reported that she is adamantly opposed. Discussed with patient that TCU would be safest and that this recommendation would likely not change. MD states that he only would support discharge to an alternate location (not TCU) if daughter is in agreement. Daughter is in agreement with patient discharging to her sister's home, which patient reports is [place] [facility].     Writer asked how patient would get there and she stated that her son would drive. Therapy will assess patient's ability to complete stairs this afternoon as her sister's home has stairs. Writer has added patient to weekend list to follow up on as if she cannot complete stairs then the plan will need to be TCU. Writer discussed this with patient and provided her with Medicare Care Compare document.     Next Steps:     Follow up on if patient can complete stairs    If patient can complete stairs:   Confirm sister is ok w/discharge to her home  Collect sister's address  Determine that son can provide transport    If patient cannot complete stairs:  Collect patient TCU preferences  Send referrals    DANUTA Velez, ERNIE  6th Floor Medical Surgical Unit  Phone [phone]      Message me securely in [employer]

## 2025-04-25 NOTE — PROGRESS NOTES
[facility]    Medicine Progress Note - Hospitalist Service, GOLD TEAM 20    Date of Admission:  4/23/2025    Assessment & Plan      Rolanda Molina is a 74 year old female admitted on 4/23/2025. 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    Fall  Unsteadiness  Intermittent confusion  Ventriculomegaly  Blindness right eye    Patient reports mechanical fall after slipping off side of bed at home.  Denies hitting her head.  CT head and C-spine without evidence of injury.  No clear syncopal symptoms however her inconsistent recollection of the timeline makes this assessment more challenging.  Denies headache, dizziness, chest pain.  Notably, CT head redemonstrates known ventriculomegaly. Patient denies incontinence, but was soaked with urine when she was found down (this may have been situational).      - Neurology consultation appreciated.  Per neurology:  There has been some question in the past whether she may have normal pressure hydrocephalus, though I do appreciate a significant degree of atrophy on her head imaging, and I have a higher suspicion for ex-vacuo dilatation.  I do not suspect that she would derive benefit from a ventricular drain, though this could be further pursued in the outpatient setting in conjunction with neurosurgery and would require a lumbar drain trial, if the patient and her family felt strongly interested in this.     Examination is also notable for some weakness with knee flexion as well as hip flexion both of which I suspect to be due to deconditioning.    -B12, folate: Normal.  A1c 5.9.  -Follow-up immunofixation labs.    -Will need updated referral for outpatient neurology   - PT/OT evaluations: Recommend TCU . Social work consulted..  - Up with assist      HTN  Previously on losartan and as needed hydralazine at home.  Patient and her daughter report  that she stopped taking these potentially months ago after the [facility] of her PCP.  Hypertensive in ED but no evidence of endorgan damage    - Restart PTA losartan 12.5 mg daily: Titrate as needed.  - Continue as needed oral hydralazine for now,  - Monitor blood pressure      Hx CVA  During workup for confusion and fall 2024, MRI showed evidence of multifocal strokes suggestive of embolic disease.  She was started on aspirin and Plavix with plan for close neurology follow-up, however she was unable to get in for this and has not been taking aspirin or Plavix anytime recently.  No focal neurological deficits on exam currently.  - will start aspirin 81 mg daily.   - Continue PTA atorvastatin    Hypokalemia  - Replacement protocol    Hx subacute angle-closure glaucoma  - Continue PTA eyedrops       Observation Goals: -diagnostic tests and consults completed and resulted, -vital signs normal or at patient baseline, -returns to baseline functional status, -safe disposition plan has been identified, Nurse to notify provider when observation goals have been met and patient is ready for discharge.  Diet: Regular Diet Adult    DVT Prophylaxis: Pneumatic Compression Devices  Ornelas Catheter: Not present  Lines: None     Cardiac Monitoring: ACTIVE order. Indication: Syncope- low cardiac risk (24 hours)  Code Status: No CPR- Pre-arrest intubation OK      Clinically Significant Risk Factors Present on Admission        # Hypokalemia: Lowest K = 3.3 mmol/L in last 2 days, will replace as needed            # Hypertension: Noted on problem list               # Financial/Environmental Concerns: none         Social Drivers of Health    Tobacco Use: High Risk (4/23/2025)    Patient History     Smoking Tobacco Use: Every Day     Smokeless Tobacco Use: Never          Disposition Plan     Medically Ready for Discharge: Anticipated Tomorrow  Pending TCU.  Discussed with , patient, daughter on phone.           Rafiq Reynoso,  MD  Hospitalist Service, GOLD TEAM 20  [facility]  Securely message with [employer] (more info)  Text page via [facility] Paging/Directory   See signed in provider for up to date coverage information  ______________________________________________________________________    Interval History   No significant interval events.  Currently doing well.  No new concern.  No chest pain shortness of breath.  No fever or chills.  No cough.  No nausea vomiting.  Tolerating p.o. well.  Pleasant.      Physical Exam   Vital Signs: Temp: 98  F (36.7  C) Temp src: Oral BP: 135/77 Pulse: 59   Resp: 18 SpO2: 94 % O2 Device: None (Room air)    Weight: 163 lbs 0 oz      General Appearance: Awake, interactive, NAD  Respiratory: Normal work of breathing.ra  Cardiovascular: RRR  GI: Soft. NT. ND.  Extremities: Distally wwp. No pedal edema  Neuro: Grossly non focal.   Others: Stable mood.      Medical Decision Making       45 MINUTES SPENT BY ME on the date of service doing chart review, history, exam, documentation & further activities per the note.      Data     I have personally reviewed the following data over the past 24 hrs:    N/A  \   N/A   / N/A     N/A N/A N/A /  93   4.2 N/A N/A \       Imaging results reviewed over the past 24 hrs:   No results found for this or any previous visit (from the past 24 hours).  Recent Labs   Lab 04/25/25  1201 04/25/25  0930 04/25/25  0736 04/25/25  0151 04/24/25  0759 04/23/25  2049   WBC  --   --   --   --   --  7.7   HGB  --   --   --   --   --  12.5   MCV  --   --   --   --   --  80   PLT  --   --   --   --   --  194   INR  --   --   --   --   --  0.98   NA  --   --   --   --   --  137   POTASSIUM  --   --  4.2  --  3.8 3.3*   CHLORIDE  --   --   --   --   --  102   CO2  --   --   --   --   --  24   BUN  --   --   --   --   --  11.4   CR  --   --   --   --   --  0.83   ANIONGAP  --   --   --   --   --  11   DONNA  --   --   --   --   --  9.4   GLC 93 104*  --   100*  --  99   ALBUMIN  --   --   --   --   --  4.4   PROTTOTAL  --   --   --   --   --  7.5   BILITOTAL  --   --   --   --   --  0.4   ALKPHOS  --   --   --   --   --  84   ALT  --   --   --   --   --  10   AST  --   --   --   --   --  20

## 2025-04-25 NOTE — PROGRESS NOTES
[facility]      OUTPATIENT OCCUPATIONAL THERAPY  EVALUATION  PLAN OF TREATMENT FOR OUTPATIENT REHABILITATION  (COMPLETE FOR INITIAL CLAIMS ONLY)  Patient's Last Name, First Name, M.I.  YOB: 1950  Rolanda Molina                          Provider's Name  [facility] Medical Record No.  [id number]                               Onset Date:  04/23/25   Start of Care Date:  04/25/25     Type:     ___PT   _X_OT   ___SLP Medical Diagnosis:  fall, secondary hypertension                        OT Diagnosis:  Decreased ind with ADL/IADL and functional cognition   Visits from SOC:  1   _________________________________________________________________________________  Plan of Treatment/Functional Goals    Planned Interventions: ADL retraining, IADL retraining, cognition, strengthening, transfer training, home program guidelines, progressive activity/exercise, risk factor education, visual perception   Goals: See Occupational Therapy Goals on Care Plan in [employer] electronic health record.    Therapy Frequency: 5 times/week  Predicted Duration of Therapy Intervention: 05/25/25  _________________________________________________________________________________    I CERTIFY THE NEED FOR THESE SERVICES FURNISHED UNDER        THIS PLAN OF TREATMENT AND WHILE UNDER MY CARE     (Physician attestation of this document indicates review and certification of the therapy plan).              Certification date from: 04/25/25, Certification date to: 05/25/25    Referring Physician: Bill Jordan MD            Initial Assessment        See Occupational Therapy evaluation dated 04/25/25 in Epic electronic health record.                   Occupational Therapy Evaluation: 04/25/25 1326   Appointment Info   Signing Clinician's Name / Credentials (OT) CHICA Chaves, OTR/L   Rehab  "Comments (OT) OBS; R eye blind   Quick Adds   Quick Adds [facility] Auth & Certification   Living Environment   People in Home facility resident   Current Living Arrangements apartment;independent living facility  (Senior Living Apartment)   Home Accessibility no concerns  (elevator access)   Transportation Anticipated family or friend will provide   Living Environment Comments Pt reports living in senior living/ILF. Pt reporting has WIS in bathroom.   Self-Care   Usual Activity Tolerance good  (4ww use at baseline)   Current Activity Tolerance fair   Equipment Currently Used at Home walker, rolling;shower chair   Fall history within last six months yes   Number of times patient has fallen within last six months 1  (tangled up in bed sheets)   Activity/Exercise/Self-Care Comment Pt reports ind-mod I ADL and mobility (4ww most of the time) at baseline.   Instrumental Activities of Daily Living (IADL)   Previous Responsibilities meal prep;housekeeping;laundry;shopping;medication management;finances   IADL Comments Pt reports ind-mod I with IADLs at baseline.   General Information   Onset of Illness/Injury or Date of Surgery 04/23/25   Referring Physician Bill Jordan MD   Patient/Family Therapy Goal Statement (OT) Return home or go to sisters house   Additional Occupational Profile Info/Pertinent History of Current Problem \"Rolanda Molina is a 74 year old female admitted on 4/23/2025. 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\"   Existing Precautions/Restrictions fall   Limitations/Impairments safety/cognitive;visual;hearing   Left Upper Extremity (Weight-bearing Status) full weight-bearing (FWB)   Right Upper Extremity (Weight-bearing Status) full weight-bearing (FWB)   Left Lower Extremity (Weight-bearing Status) full weight-bearing (FWB)   Right Lower Extremity (Weight-bearing Status) full weight-bearing (FWB)   General " "Observations and Info Activity: ambulate with assist   Cognitive Status Examination   Orientation Status orientation to person, place and time   Cognitive Status Comments Pt oriented however asking several questions multiple times throughout evaluation.   Visual Perception   Impact of Vision Impairment on Function (Vision) No vision in R eye, L eye is \"fine\" per pt report   Sensory   Sensory Quick Adds sensation intact   Pain Assessment   Patient Currently in Pain No   Posture   Posture forward head position;protracted shoulders   Range of Motion Comprehensive   General Range of Motion bilateral upper extremity ROM WFL   Strength Comprehensive (MMT)   Comment, General Manual Muscle Testing (MMT) Assessment Pt reporting UE strength has been \"alright\", to then reporting, \"I can still cook\".  Per clinical judgement, UE weakness noted.   Coordination   Upper Extremity Coordination No deficits were identified   Bed Mobility   Bed Mobility supine-sit   Comment (Bed Mobility) SBA   Transfers   Transfers toilet transfer   Transfer Comments Min-mod A per clinical judgement   Activities of Daily Living   BADL Assessment/Intervention bathing;upper body dressing;lower body dressing;grooming;toileting   Bathing Assessment/Intervention   [place] Level (Bathing) moderate assist (50% patient effort)   Comment, (Bathing) Per clinical judgement   Upper Body Dressing Assessment/Training   Comment, (Upper Body Dressing) Per clinical judgement   [place] Level (Upper Body Dressing) supervision   Lower Body Dressing Assessment/Training   Comment, (Lower Body Dressing) Per clinical judgement   [place] Level (Lower Body Dressing) maximum assist (25% patient effort)   Grooming Assessment/Training   [place] Level (Grooming) minimum assist (75% patient effort)   Comment, (Grooming) Per clinical judgement   Toileting   Comment, (Toileting) Per clinical judgement   [place] Level (Toileting) minimum assist (75% patient " effort)   Clinical Impression   Criteria for Skilled Therapeutic Interventions Met (OT) Yes, treatment indicated   OT Diagnosis Decreased ind with ADL/IADL and functional cognition   OT Problem List-Impairments impacting ADL problems related to;activity tolerance impaired;cognition;strength;mobility;vision;hearing   Assessment of Occupational Performance 3-5 Performance Deficits   Identified Performance Deficits Bathing, dressing, g/h, toileting, functional cognition   Planned Therapy Interventions (OT) ADL retraining;IADL retraining;cognition;strengthening;transfer training;home program guidelines;progressive activity/exercise;risk factor education;visual perception   Clinical Decision Making Complexity (OT) problem focused assessment/low complexity   Risk & Benefits of therapy have been explained evaluation/treatment results reviewed;care plan/treatment goals reviewed;risks/benefits reviewed;current/potential barriers reviewed;participants voiced agreement with care plan;participants included;patient   Clinical Impression Comments Pt would benefit from skilled IP OT services to increase ind and safety with ADL/IADL and functional cognition while IP.   OT Total Evaluation Time   OT Eval, Low Complexity Minutes (84096) 9   Therapy Certification   Medical Diagnosis fall, secondary hypertension   Start of Care Date 04/25/25   Certification date from 04/25/25   Certification date to 05/25/25   [facility] Authorization Information   Condition type Initial onset (within last 3 months)   Cause of current episode Unspecified   Nature of treatment Rehabilitative   Functional ability Moderate functional limitations   Documented POC (choose all that apply) Measurable short and long term/discharge treatment goals related to physical and functional deficits.;Frequency of treatment visits and treatment activities to address deficit areas.;Patient agrees to program participation including home program   Briefly describe symptoms  Weakness   How did the symptoms start falling OOB   Last 24H: avg pain/symptom intensity  no pain   Past wk: avg pain/symptom intensity no pain   Frequency of Symptoms Intermittently (0-25% of the time)   Symptom impact on ADLs Moderately   Condition change since eval N/A (first visit)   General health reported by patient Good   OT Goals   Therapy Frequency (OT) 5 times/week   OT Predicted Duration/Target Date for Goal Attainment 05/25/25   OT Goals Hygiene/Grooming;Upper Body Dressing;Lower Body Dressing;Upper Body Bathing;Lower Body Bathing;Toilet Transfer/Toileting;Meal Preparation;Home Management;Cognition   OT: Hygiene/Grooming modified independent   OT: Upper Body Dressing Modified independent   OT: Lower Body Dressing Modified independent   OT: Upper Body Bathing Modified independent   OT: Lower Body Bathing Modified independent   OT: Toilet Transfer/Toileting Modified independent   OT: Meal Preparation Modified independent   OT: Home Management Modified independent   OT: Cognitive Patient/caregiver will verbalize understanding of cognitive assessment results/recommendations as needed for safe discharge planning   OT Discharge Planning   OT Plan cog assess (if appropriate), standing ADLs, total body dressing   OT Discharge Recommendation (DC Rec) Transitional Care Facility   OT Rationale for DC Rec Anticipate when pt is medically stable, would benefit from TCU setting for increased therapies to progress ind and safety with ADL/IADL and functional cognition prior to returning to ILF.   OT Brief overview of current status A x1 with 2ww   OT Equipment Needed at Discharge raised toilet seat

## 2025-04-25 NOTE — PLAN OF CARE
Problem: Adult Inpatient Plan of Care  Goal: Plan of Care Review  Description: The Plan of Care Review/Shift note should be completed every shift.  The Outcome Evaluation is a brief statement about your assessment that the patient is improving, declining, or no change.  This information will be displayed automatically on your shiftnote.  Outcome: Not Progressing  Flowsheets (Taken 4/25/2025 1056)  Outcome Evaluation: Patient to discharge to Transitional Care when able.  Plan of Care Reviewed With: patient  Overall Patient Progress: no change  Goal: Readiness for Transition of Care  Outcome: Not Progressing  Flowsheets (Taken 4/25/2025 1000)  Transportation Anticipated: family or friend will provide  Concerns to be Addressed: discharge planning  Intervention: Mutually Develop Transition Plan  Recent Flowsheet Documentation  Taken 4/25/2025 1000 by Sabina William LICSW  Readmission Within the Last 30 Days: no previous admission in last 30 days  Transportation Anticipated: family or friend will provide  Transportation Concerns: none  Concerns to be Addressed: discharge planning  Patient/Family Anticipated Services at Transition: rehabilitation services  Patient/Family Anticipates Transition to: (Transitional Care) other (comment)  Offered/Gave Vendor List: yes  Equipment Currently Used at Home:   walker, rolling   shower chair     Problem: Adult Inpatient Plan of Care  Goal: Plan of Care Review  Description: The Plan of Care Review/Shift note should be completed every shift.  The Outcome Evaluation is a brief statement about your assessment that the patient is improving, declining, or no change.  This information will be displayed automatically on your shiftnote.  Outcome: Not Progressing  Flowsheets (Taken 4/25/2025 1056)  Outcome Evaluation: Patient to discharge to Transitional Care when able.  Plan of Care Reviewed With: patient  Overall Patient Progress: no change  Goal: Patient-Specific Goal  "(Individualized)  Description: You can add care plan individualizations to a care plan. Examples of Individualization might be:  \"Parent requests to be called daily at 9am for status\", \"I have a hard time hearing out of my right ear\", or \"Do not touch me to wake me up as it startlesme\".  Outcome: Not Progressing  Goal: Absence of Hospital-Acquired Illness or Injury  Outcome: Not Progressing  Goal: Optimal Comfort and Wellbeing  Outcome: Not Progressing  Goal: Readiness for Transition of Care  Outcome: Not Progressing  Flowsheets (Taken 4/25/2025 1000)  Transportation Anticipated: family or friend will provide  Concerns to be Addressed: discharge planning  Intervention: Mutually Develop Transition Plan  Recent Flowsheet Documentation  Taken 4/25/2025 1000 by Sabina William LICSW  Readmission Within the Last 30 Days: no previous admission in last 30 days  Transportation Anticipated: family or friend will provide  Transportation Concerns: none  Concerns to be Addressed: discharge planning  Patient/Family Anticipated Services at Transition: rehabilitation services  Patient/Family Anticipates Transition to: (Transitional Care) other (comment)  Offered/Gave Vendor List: yes  Equipment Currently Used at Home:   walker, rolling   shower chair     Problem: Delirium  Goal: Optimal Coping  Outcome: Not Progressing  Goal: Improved Behavioral Control  Outcome: Not Progressing  Goal: Improved Attention and Thought Clarity  Outcome: Not Progressing  Goal: Improved Sleep  Outcome: Not Progressing     "

## 2025-04-26 ENCOUNTER — APPOINTMENT (OUTPATIENT)
Dept: PHYSICAL THERAPY | Facility: CLINIC | Age: 75
End: 2025-04-26
Payer: COMMERCIAL

## 2025-04-26 PROCEDURE — 250N000013 HC RX MED GY IP 250 OP 250 PS 637: Performed by: STUDENT IN AN ORGANIZED HEALTH CARE EDUCATION/TRAINING PROGRAM

## 2025-04-26 PROCEDURE — 250N000013 HC RX MED GY IP 250 OP 250 PS 637: Performed by: INTERNAL MEDICINE

## 2025-04-26 PROCEDURE — G0378 HOSPITAL OBSERVATION PER HR: HCPCS

## 2025-04-26 PROCEDURE — 97116 GAIT TRAINING THERAPY: CPT | Mod: GP

## 2025-04-26 RX ADMIN — LATANOPROST 1 DROP: 50 SOLUTION OPHTHALMIC at 21:20

## 2025-04-26 RX ADMIN — BRIMONIDINE TARTRATE 1 DROP: 2 SOLUTION/ DROPS OPHTHALMIC at 20:06

## 2025-04-26 RX ADMIN — DORZOLAMIDE HYDROCHLORIDE AND TIMOLOL MALEATE 1 DROP: 20; 5 SOLUTION OPHTHALMIC at 09:11

## 2025-04-26 RX ADMIN — BRIMONIDINE TARTRATE 1 DROP: 2 SOLUTION/ DROPS OPHTHALMIC at 09:11

## 2025-04-26 RX ADMIN — LOSARTAN POTASSIUM 12.5 MG: 25 TABLET, FILM COATED ORAL at 09:11

## 2025-04-26 RX ADMIN — ASPIRIN 81 MG: 81 TABLET ORAL at 09:11

## 2025-04-26 RX ADMIN — ATORVASTATIN CALCIUM 40 MG: 40 TABLET, FILM COATED ORAL at 19:57

## 2025-04-26 RX ADMIN — BRIMONIDINE TARTRATE 1 DROP: 2 SOLUTION/ DROPS OPHTHALMIC at 13:41

## 2025-04-26 RX ADMIN — DORZOLAMIDE HYDROCHLORIDE AND TIMOLOL MALEATE 1 DROP: 20; 5 SOLUTION OPHTHALMIC at 20:01

## 2025-04-26 ASSESSMENT — ACTIVITIES OF DAILY LIVING (ADL)
ADLS_ACUITY_SCORE: 73

## 2025-04-26 NOTE — PLAN OF CARE
Blood pressure (!) 158/94, pulse 73, temperature 97.4  F (36.3  C), temperature source Oral, resp. rate 18, weight 73.9 kg (163 lb), SpO2 96%, not currently breastfeeding.     Patient alert and oriented times 3 with intermittent confusion. Able to make needs known. Right eye blindness/blurred(she reported that she can see the eye drop bottle while giving her eye drop).  Assist of one with gait belt and walker. On room air, continent bowel and bladder. No acute event during this shift, bed in low position, bed alarm on and call light with in reach.      Goal Outcome Evaluation:      Plan of Care Reviewed With: patient    Overall Patient Progress: no changeOverall Patient Progress: no change

## 2025-04-26 NOTE — PROGRESS NOTES
[facility]    Medicine Progress Note - Hospitalist Service, GOLD TEAM 17    Date of Admission:  4/23/2025    Assessment & Plan     Rolanda Molina is a 74 year old female admitted on 4/23/2025. 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     Fall  Unsteadiness  Intermittent confusion  Ventriculomegaly  Blindness right eye   Patient reports mechanical fall after slipping off side of bed at home.  Denies hitting her head.  CT head and C-spine without evidence of injury.  No clear syncopal symptoms however her inconsistent recollection of the timeline makes this assessment more challenging.  Denies headache, dizziness, chest pain.  Notably, CT head redemonstrates known ventriculomegaly. Patient denies incontinence, but was soaked with urine when she was found down (this may have been situational).     - Neurology consultation appreciated.  Per neurology:  There has been some question in the past whether she may have normal pressure hydrocephalus, though I do appreciate a significant degree of atrophy on her head imaging, and I have a higher suspicion for ex-vacuo dilatation.  I do not suspect that she would derive benefit from a ventricular drain, though this could be further pursued in the outpatient setting in conjunction with neurosurgery and would require a lumbar drain trial, if the patient and her family felt strongly interested in this.     Examination is also notable for some weakness with knee flexion as well as hip flexion both of which I suspect to be due to deconditioning.     -B12, folate: Normal.  A1c 5.9.  -Follow-up immunofixation labs.    -Will need updated referral for outpatient neurology   - PT/OT evaluations: Recommend TCU . Social work consulted..  - Up with assist  - physical therapy  OR rec TCU but patient  family declined, sounds like  patient  can be with her  sister  who lives in [place] but needs to be able to shawna stairs    - I spoke with Bushra her daughter and seemed that patient  was better today , they are discussing next steps weather patient  will go and be with her sister   - 4/26 I spoke with patient 's daughter  Bushra  and daughter yobani that if TCU is recommended then they would be ok with TCU , if she can go home but if needs someone with her 24/7 then that would be with her sister in [place] and has stairs to get into the house. Kids are all working  so cannot be with her    - also also did convey neurology's recommendations     HTN  Previously on losartan and as needed hydralazine at home.  Patient and her daughter report that she stopped taking these potentially months ago after the alf of her PCP.  Hypertensive in ED but no evidence of endorgan damage     - Restart PTA losartan 12.5 mg daily: Titrate as needed.  - Continue as needed oral hydralazine for now,  - Monitor blood pressure        Hx CVA  During workup for confusion and fall 2024, MRI showed evidence of multifocal strokes suggestive of embolic disease.  She was started on aspirin and Plavix with plan for close neurology follow-up, however she was unable to get in for this and has not been taking aspirin or Plavix anytime recently.  No focal neurological deficits on exam currently.  - was started on  aspirin 81 mg daily.   - Continue PTA atorvastatin     Hypokalemia  - Replacement protocol       Hyperglycemia  - HgA1c was 5.9    Hx subacute angle-closure glaucoma  - Continue PTA eyedrops           Observation Goals: -diagnostic tests and consults completed and resulted, -vital signs normal or at patient baseline, -returns to baseline functional status, -safe disposition plan has been identified, Nurse to notify provider when observation goals have been met and patient is ready for discharge.  Diet: Regular Diet Adult    DVT Prophylaxis: Pneumatic Compression Devices  Ornelas Catheter: Not  present  Lines: None     Cardiac Monitoring: None  Code Status: No CPR- Pre-arrest intubation OK      Clinically Significant Risk Factors Present on Admission                   # Hypertension: Noted on problem list               # Financial/Environmental Concerns: none         Social Drivers of Health    Tobacco Use: High Risk (4/23/2025)    Patient History     Smoking Tobacco Use: Every Day     Smokeless Tobacco Use: Never          Disposition Plan     Medically Ready for Discharge: Anticipated Today           Alayna Zapien MD  Hospitalist Service, GOLD TEAM 17  [facility]  Securely message with [employer] (more info)  Text page via [employer] Paging/Directory   See signed in provider for up to date coverage information  ______________________________________________________________________    Interval History   In bed, awaiting her lunch, ha snot been out of bed or walked stairs     Physical Exam   Vital Signs: Temp: 98.6  F (37  C) Temp src: Oral BP: (!) 161/99 Pulse: 67   Resp: 18 SpO2: 100 % O2 Device: None (Room air)    Weight: 163 lbs 0 oz  General appearence: awake alert  in no apparent distress      NECK : supple  RESPIRATORY: lungs clear    CARDIOVASCULAR:S1 S2 regular rate and rhythm, no rubs gallops or murmurs appreciated  GASTROINTESTINAL:soft, non-distended , non-tender , + bowel sounds,   SKIN: warm and dry, no mottling noted   NEUROLOGIC; awake alert and oriented,  EXTREMITIES: no clubbing, cyanosis or edema , moves all extremity,        Data         Imaging results reviewed over the past 24 hrs:   No results found for this or any previous visit (from the past 24 hours).

## 2025-04-26 NOTE — PLAN OF CARE
[id number]    Goal Outcome Evaluation:     Plan of Care Reviewed With: patient     Overall Patient Progress: no change     Outcome Evaluation:     VS: /76 (BP Location: Right arm)   Pulse 65   Temp 98.4  F (36.9  C) (Oral)   Resp 18   Wt 73.9 kg (163 lb)   LMP  (LMP Unknown)   SpO2 100%   BMI 25.53 kg/m      O2: SpO2 > 100% and stable on RA. LS clear and equal bilaterally. Denies chest pain headache and SOB.    Output: continent of bowel and bladder and walks to the bathroom with assist of one as needed   Last BM: Denies abdominal discomfort. BS active / passing flatus.    Activity: Assist of with walker   Skin: Visible skin intact   Pain: Denies any pain management    CMS: Intact, Disoriented to time. Denies numbness and tingling.   Dressing: NA   Diet: Regular diet. Denies nausea/vomiting.     LDA:  No PIV access.    Equipment: IV pole, personal belongings,    Plan: Standard precautions maintained / Continue with plan of care. Call light within reach, pt able to make needs known.    Additional Info: Completed a PT evaluation

## 2025-04-26 NOTE — PLAN OF CARE
Pt slept for the most part of the shift, no acute changes during this shift. Continue with POC

## 2025-04-27 ENCOUNTER — APPOINTMENT (OUTPATIENT)
Dept: OCCUPATIONAL THERAPY | Facility: CLINIC | Age: 75
End: 2025-04-27
Payer: COMMERCIAL

## 2025-04-27 PROCEDURE — 250N000013 HC RX MED GY IP 250 OP 250 PS 637: Performed by: STUDENT IN AN ORGANIZED HEALTH CARE EDUCATION/TRAINING PROGRAM

## 2025-04-27 PROCEDURE — G0378 HOSPITAL OBSERVATION PER HR: HCPCS

## 2025-04-27 PROCEDURE — 97530 THERAPEUTIC ACTIVITIES: CPT | Mod: GO

## 2025-04-27 PROCEDURE — 250N000013 HC RX MED GY IP 250 OP 250 PS 637: Performed by: INTERNAL MEDICINE

## 2025-04-27 RX ORDER — ATORVASTATIN CALCIUM 40 MG/1
40 TABLET, FILM COATED ORAL EVERY EVENING
Status: ON HOLD | DISCHARGE
Start: 2025-04-27 | End: 2025-04-29

## 2025-04-27 RX ADMIN — LATANOPROST 1 DROP: 50 SOLUTION OPHTHALMIC at 22:06

## 2025-04-27 RX ADMIN — BRIMONIDINE TARTRATE 1 DROP: 2 SOLUTION/ DROPS OPHTHALMIC at 19:43

## 2025-04-27 RX ADMIN — DORZOLAMIDE HYDROCHLORIDE AND TIMOLOL MALEATE 1 DROP: 20; 5 SOLUTION OPHTHALMIC at 07:57

## 2025-04-27 RX ADMIN — BRIMONIDINE TARTRATE 1 DROP: 2 SOLUTION/ DROPS OPHTHALMIC at 07:58

## 2025-04-27 RX ADMIN — ATORVASTATIN CALCIUM 40 MG: 40 TABLET, FILM COATED ORAL at 19:43

## 2025-04-27 RX ADMIN — DORZOLAMIDE HYDROCHLORIDE AND TIMOLOL MALEATE 1 DROP: 20; 5 SOLUTION OPHTHALMIC at 19:43

## 2025-04-27 RX ADMIN — ASPIRIN 81 MG: 81 TABLET ORAL at 07:48

## 2025-04-27 RX ADMIN — LOSARTAN POTASSIUM 12.5 MG: 25 TABLET, FILM COATED ORAL at 07:48

## 2025-04-27 RX ADMIN — BRIMONIDINE TARTRATE 1 DROP: 2 SOLUTION/ DROPS OPHTHALMIC at 15:39

## 2025-04-27 ASSESSMENT — ACTIVITIES OF DAILY LIVING (ADL)
ADLS_ACUITY_SCORE: 73
ADLS_ACUITY_SCORE: 73
ADLS_ACUITY_SCORE: 69
ADLS_ACUITY_SCORE: 69
ADLS_ACUITY_SCORE: 73
ADLS_ACUITY_SCORE: 69
ADLS_ACUITY_SCORE: 73
ADLS_ACUITY_SCORE: 73
ADLS_ACUITY_SCORE: 69
ADLS_ACUITY_SCORE: 69
ADLS_ACUITY_SCORE: 73
ADLS_ACUITY_SCORE: 69
ADLS_ACUITY_SCORE: 73
ADLS_ACUITY_SCORE: 69
ADLS_ACUITY_SCORE: 69
ADLS_ACUITY_SCORE: 73

## 2025-04-27 NOTE — PROGRESS NOTES
Care Management Follow Up    Length of Stay (days): 0    Expected Discharge Date: 04/29/2025     Concerns to be Addressed: discharge planning     Patient plan of care discussed at interdisciplinary rounds: Yes    Anticipated Discharge Disposition: Transitional Care     Anticipated Discharge Services: Transportation Services, County Worker  Anticipated Discharge DME: None    Patient/family educated on Medicare website which has current facility and service quality ratings: yes  Education Provided on the Discharge Plan: Yes  Patient/Family in Agreement with the Plan: no (Patient agreeable to referrals being sent for TCU but is apprehensive. She maintains that she wants to go to her sister's home. She states that she can do stairs but has not passed stairs with PT.)    Referrals Placed by CM/SW: Post Acute Facilities    Pending:    [facility] 4th floor  [facility] [address] [place] [address]  [id number]  Admissions: [phone]  Fax: [phone]  RN to RN:  [phone]  4/27: Initial referral sent    [address] [id number] [phone]  Dee Garcia - [facility]  Phone: [phone]  Fax: [phone]  Email: [email or web address]  4/27: Initial referral sent to Dee garcia     [facility]  [address] [place] [address]  Main: [phone]  Admissions: [phone]  4/27: Initial referral sent    [facility]   [address] [place], [place] [id number] [phone]  4/27: Initial referral sent    [facility]  [address] [place], [place] [id number] [phone]  4/27: Initial referral sent    Private pay costs discussed: insurance costs co-pays    Discussed  Partnership in Safe Discharge Planning  document with patient/family: Yes: discussed that we discharge to first accepting facility.     Handoff Completed: No, handoff not indicated or clinically appropriate    Additional  "Information:    Per PT, patient was not able to complete the stairs necessary to go to her sister's home in [place]. PT states that she was unsafe attempting to navigate 1 stair with mod assist from therapist. PT continues to recommend TCU.    SW met with patient at bedside to discuss recommendation of TCU. Patient agreed that a TCU stay for rehab would be beneficial. SW presented Medicare.[employer] list of TCU facilities. SW explained the Medicare.gov list - the star rating, what contributes to the rating, and how to find additional information about most recent health department survey results on Medicare.gov. SW explained referral process, noting that SW will send referrals as soon as possible in order to avoid a prolonged hospitalization while waiting for accepting facility. SW explained that acceptance to TCU facilitates depends on many factors, including bed availability/staffing, insurance coverage, and level of care/acuity. SW explained that once patient is medically ready for discharge, patient will be discharged to the first accepting facility. Patient verbalized understanding. SW discussed insurance coverage for TCU. Patient has [employer] MEDICARE ADVANTAGE for insurance. SW reviewed days 1-20 and days  under Medicare coverage.   Patient reported that she is able to complete stairs and that PT just \"didn't give her a chance.\" Patient maintains that she would like to discharge to her sister's house. SW encouraged her to continue working with therapies and if she gets to a point that she is safe enough to discharge home/to sister's home, we will assist her with that. Patient agreeable to referrals being sent to five specific facilities (see above). It is writer's assessment that patient is not committed to going to TCU and may need additional teaching about importance. It would be appreciated if all members of care team continue to provide information on the importance of TCU and risks of returning " home at current state.    SW initiated referrals to TCU (see above).    Next Steps:     - Follow up on TCU referrals    - Continue to discuss discharge plan/recommendations with patient          BEKAH HairstonW  4/27/2025       Social Work and Care Management Department       SEARCHABLE in [facility] - search SOCIAL WORK       [place] (0800 - 1630) Saturday and Sunday     Units: 4A Vocera, 4C Vocera, & 4E Vocera        Units: 5A 2825-7346 Vocera, 5A 0946-2068 Vocera , BMT SW 1 BMT SW 2, BMT SW 3 & BMT SW 4  5C Off Service 5401 - 5416  5C Off Service [id number]     Units: 6A Vocera & 6B Vocera      Units: 6C Vocera     Units: 7A Vocera & 7B Vocera      Units: 7C Med Surg 7401 thru 7418 and 7C Med Surg 7502 thru 7521      Unit: [place] ED Vocera & [place] Obs Vocera     [facility] (1523-8569) Saturday and Sunday      Units: 5 Ortho Vocera, 5 Med Surg Vocera & WB ED Vocera     Units: 6 Med Surg Vocera, 8 Med Surg Vocera, & 10 ICU Vocera      After hours Vocera [facility] and After Hours Vocera [place]     Please NOTE changes to times below:    **Saturday & Sunday (1630 - 2030)    **Mon-Fri (6607-5642)     **[facility] Recognized Holidays  (9507-8321)    Units: ALL   - see above VOCERA links to units

## 2025-04-27 NOTE — PROGRESS NOTES
[id number]    VS: BP (!) 145/85 (BP Location: Right arm)   Pulse 74   Temp 98  F (36.7  C) (Oral)   Resp 18   Wt 73.9 kg (163 lb)   LMP  (LMP Unknown)   SpO2 97%   BMI 25.53 kg/m     O2: Stable on Room air . Denies SOB   Output: Continent of bowel and bladder, voids spontaneously w/o difficulty   Last BM:    Activity: Assist of 1 with walker   Skin: WDL   Pain: denies              CMS: A&O4, denies chest pain, n/v, numbness/tingling, and dizziness   Dressing: none   Diet: Regular   LDA: PIV SL   Equipment: Personal belongings   Plan: Call light in reach, continue POC   Additional Info:

## 2025-04-27 NOTE — PLAN OF CARE
[id number]  Goal Outcome Evaluation:  Blood pressure (!) 141/101, pulse 80, temperature 97.7  F (36.5  C), temperature source Oral, resp. rate 17, weight 73.9 kg (163 lb), SpO2 100%, not currently breastfeeding.  Pt alert disoriented to time. On RA no SOB noted. Pt not oob during this shift. Bowel and bladder continent. Pt was talking on the phone with her sister. Call light within her reach. Pt can express her needs properly.      Plan of Care Reviewed With: patient    Overall Patient Progress: no changeOverall Patient Progress: no change

## 2025-04-27 NOTE — PROGRESS NOTES
[facility]    Medicine Progress Note - Hospitalist Service, GOLD TEAM 17    Date of Admission:  4/23/2025    Assessment & Plan     Rolanda Molina is a 74 year old female admitted on 4/23/2025. 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       4/27  - recs are for TCU, per my discussion with her daughter 4/26 family would go along with our recs, patient  refuses TCU, she is telling me she did great with stairs ( not per physical therapy ) refusing TCU  - asking psych to help determine medical decision capacity as I do not believe she has a great insight, per RN has periodic confusion     Discharge  orders placed, please update if anything changes !      Fall  Unsteadiness  Intermittent confusion  Ventriculomegaly  Blindness right eye   Patient reports mechanical fall after slipping off side of bed at home.  Denies hitting her head.  CT head and C-spine without evidence of injury.  No clear syncopal symptoms however her inconsistent recollection of the timeline makes this assessment more challenging.  Denies headache, dizziness, chest pain.  Notably, CT head redemonstrates known ventriculomegaly. Patient denies incontinence, but was soaked with urine when she was found down (this may have been situational).     - Neurology consultation appreciated.  Per neurology:  There has been some question in the past whether she may have normal pressure hydrocephalus, though I do appreciate a significant degree of atrophy on her head imaging, and I have a higher suspicion for ex-vacuo dilatation.  I do not suspect that she would derive benefit from a ventricular drain, though this could be further pursued in the outpatient setting in conjunction with neurosurgery and would require a lumbar drain trial, if the patient and her family felt strongly interested in this.     Examination is also  notable for some weakness with knee flexion as well as hip flexion both of which I suspect to be due to deconditioning.     -B12, folate: Normal.  A1c 5.9.  -Follow-up immunofixation labs.    -Will need updated referral for outpatient neurology   - PT/OT evaluations: Recommend TCU . Social work consulted..  - Up with assist  - physical therapy  OR rec TCU but patient  family declined, sounds like  patient  can be with her sister  who lives in [place] but needs to be able to shawna stairs    - I spoke with Bushra her daughter and seemed that patient  was better today , they are discussing next steps weather patient  will go and be with her sister   - 4/26 I spoke with patient 's daughter  Bushra  and daughter yobani that if TCU is recommended then they would be ok with TCU , if she can go home but if needs someone with her 24/7 then that would be with her sister in [place] and has stairs to get into the house. Kids are all working  so cannot be with her    - also also did convey neurology's recommendations     HTN  Previously on losartan and as needed hydralazine at home.  Patient and her daughter report that she stopped taking these potentially months ago after the halfway of her PCP.  Hypertensive in ED but no evidence of endorgan damage     - Restart PTA losartan 12.5 mg daily: Titrate as needed.  - Continue as needed oral hydralazine for now,  - Monitor blood pressure        Hx CVA  During workup for confusion and fall 2024, MRI showed evidence of multifocal strokes suggestive of embolic disease.  She was started on aspirin and Plavix with plan for close neurology follow-up, however she was unable to get in for this and has not been taking aspirin or Plavix anytime recently.  No focal neurological deficits on exam currently.  - was started on  aspirin 81 mg daily.   - Continue PTA atorvastatin     Hypokalemia  - Replacement protocol       Hyperglycemia  - HgA1c was 5.9    Hx subacute angle-closure glaucoma  -  Continue PTA eyedrops  - patient  was to see oculopastics for evisceration/enucleation evaluation            Observation Goals: -diagnostic tests and consults completed and resulted, -vital signs normal or at patient baseline, -returns to baseline functional status, -safe disposition plan has been identified, Nurse to notify provider when observation goals have been met and patient is ready for discharge.  Diet: Regular Diet Adult    DVT Prophylaxis: Pneumatic Compression Devices  Ornelas Catheter: Not present  Lines: None     Cardiac Monitoring: None  Code Status: No CPR- Pre-arrest intubation OK      Clinically Significant Risk Factors Present on Admission                   # Hypertension: Noted on problem list               # Financial/Environmental Concerns: none         Social Drivers of Health    Tobacco Use: High Risk (4/23/2025)    Patient History     Smoking Tobacco Use: Every Day     Smokeless Tobacco Use: Never          Disposition Plan     Medically Ready for Discharge: Ready Now needs TCU            Alayna Zapien MD  Hospitalist Service, GOLD TEAM 17  [facility]  Securely message with [employer] (more info)  Text page via [email or web address] Paging/Directory   See signed in provider for up to date coverage information  ______________________________________________________________________    Interval History   Doing ok, in bed, tells me she did great with staors yesterday ( not percase management)     Physical Exam   Vital Signs: Temp: 98  F (36.7  C) Temp src: Oral BP: 135/78 Pulse: 66   Resp: 18 SpO2: 98 % O2 Device: None (Room air)    Weight: 163 lbs 0 oz  General appearence: awake alert  in no apparent distress      NECK : supple  RESPIRATORY: lungs clear    CARDIOVASCULAR:S1 S2 regular rate and rhythm, no rubs gallops or murmurs appreciated  GASTROINTESTINAL:soft, non-distended , non-tender , + bowel sounds,   SKIN: warm and dry, no mottling noted   NEUROLOGIC; awake  alert , speech clear   EXTREMITIES: no clubbing, cyanosis or edema , moves all extremity,        Data         Imaging results reviewed over the past 24 hrs:   No results found for this or any previous visit (from the past 24 hours).

## 2025-04-27 NOTE — PROGRESS NOTES
Shift 3048-3086    Goal Outcome Evaluation:       Plan of Care Reviewed With: patient     Overall Patient Progress: no change    /73 (BP Location: Right arm)   Pulse 81   Temp 97.6  F (36.4  C) (Oral)   Resp 16   Wt 73.9 kg (163 lb)   LMP  (LMP Unknown)   SpO2 100%   BMI 25.53 kg/m      AOx4 with int.confusion. On RA. Denies SOB or chest pain. Continent of bowel and bladder. Ax1 with walker and gait belt. Denies any pain. Reg Diet. NO IV ACCESS. Personal belongings and call light in reach. Continue with POC. Able to make needs known.

## 2025-04-28 ENCOUNTER — HOSPITAL ENCOUNTER (OUTPATIENT)
Facility: CLINIC | Age: 75
Discharge: HOME | End: 2025-04-29
Attending: INTERNAL MEDICINE | Admitting: INTERNAL MEDICINE
Payer: COMMERCIAL

## 2025-04-28 VITALS
RESPIRATION RATE: 20 BRPM | BODY MASS INDEX: 25.53 KG/M2 | SYSTOLIC BLOOD PRESSURE: 152 MMHG | DIASTOLIC BLOOD PRESSURE: 108 MMHG | OXYGEN SATURATION: 100 % | HEART RATE: 79 BPM | WEIGHT: 163 LBS | TEMPERATURE: 97.3 F

## 2025-04-28 DIAGNOSIS — H40.89 OTHER GLAUCOMA OF RIGHT EYE: ICD-10-CM

## 2025-04-28 DIAGNOSIS — I63.19 CEREBROVASCULAR ACCIDENT (CVA) DUE TO EMBOLISM OF OTHER PRECEREBRAL ARTERY (H): ICD-10-CM
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
## 2025-04-28 DIAGNOSIS — R53.1 WEAKNESS GENERALIZED: ICD-10-CM

## 2025-04-28 DIAGNOSIS — I10 HYPERTENSION, UNSPECIFIED TYPE: ICD-10-CM

## 2025-04-28 DIAGNOSIS — H40.2213 CHRONIC PRIMARY ANGLE-CLOSURE GLAUCOMA OF RIGHT EYE, SEVERE STAGE: ICD-10-CM

## 2025-04-28 LAB
GLUCOSE BLDC GLUCOMTR-MCNC: 102 MG/DL (ref 70–99)
GLUCOSE BLDC GLUCOMTR-MCNC: 109 MG/DL (ref 70–99)

## 2025-04-28 PROCEDURE — 99207 PR NO BILLABLE SERVICE THIS VISIT: CPT | Performed by: INTERNAL MEDICINE

## 2025-04-28 PROCEDURE — 97530 THERAPEUTIC ACTIVITIES: CPT | Mod: GP | Performed by: REHABILITATION PRACTITIONER

## 2025-04-28 PROCEDURE — 101N000002 HC CUSTODIAL CARE DAILY

## 2025-04-28 PROCEDURE — G0378 HOSPITAL OBSERVATION PER HR: HCPCS

## 2025-04-28 PROCEDURE — 250N000013 HC RX MED GY IP 250 OP 250 PS 637: Performed by: INTERNAL MEDICINE

## 2025-04-28 PROCEDURE — 82962 GLUCOSE BLOOD TEST: CPT

## 2025-04-28 PROCEDURE — 97535 SELF CARE MNGMENT TRAINING: CPT | Mod: GO

## 2025-04-28 PROCEDURE — 250N000013 HC RX MED GY IP 250 OP 250 PS 637: Performed by: STUDENT IN AN ORGANIZED HEALTH CARE EDUCATION/TRAINING PROGRAM

## 2025-04-28 PROCEDURE — 97530 THERAPEUTIC ACTIVITIES: CPT | Mod: GO

## 2025-04-28 PROCEDURE — 250N000009 HC RX 250: Performed by: INTERNAL MEDICINE

## 2025-04-28 RX ORDER — ATORVASTATIN CALCIUM 40 MG/1
40 TABLET, FILM COATED ORAL EVERY EVENING
Status: DISCONTINUED | OUTPATIENT
Start: 2025-04-28 | End: 2025-04-29 | Stop reason: HOSPADM

## 2025-04-28 RX ORDER — ASPIRIN 81 MG/1
81 TABLET, CHEWABLE ORAL DAILY
Status: DISCONTINUED | OUTPATIENT
Start: 2025-04-29 | End: 2025-04-29 | Stop reason: HOSPADM

## 2025-04-28 RX ORDER — ASPIRIN 81 MG/1
81 TABLET, COATED ORAL DAILY
Qty: 30 TABLET | Refills: 0 | Status: ON HOLD | OUTPATIENT
Start: 2025-04-29 | End: 2025-04-29

## 2025-04-28 RX ORDER — DORZOLAMIDE HYDROCHLORIDE AND TIMOLOL MALEATE 20; 5 MG/ML; MG/ML
1 SOLUTION/ DROPS OPHTHALMIC EVERY 12 HOURS SCHEDULED
Status: DISCONTINUED | OUTPATIENT
Start: 2025-04-28 | End: 2025-04-29 | Stop reason: HOSPADM

## 2025-04-28 RX ORDER — LATANOPROST 50 UG/ML
1 SOLUTION/ DROPS OPHTHALMIC AT BEDTIME
Status: DISCONTINUED | OUTPATIENT
Start: 2025-04-28 | End: 2025-04-29 | Stop reason: HOSPADM

## 2025-04-28 RX ORDER — BRIMONIDINE TARTRATE 2 MG/ML
1 SOLUTION/ DROPS OPHTHALMIC 3 TIMES DAILY
Status: DISCONTINUED | OUTPATIENT
Start: 2025-04-28 | End: 2025-04-29 | Stop reason: HOSPADM

## 2025-04-28 RX ADMIN — DORZOLAMIDE HYDROCHLORIDE AND TIMOLOL MALEATE 1 DROP: 20; 5 SOLUTION OPHTHALMIC at 09:32

## 2025-04-28 RX ADMIN — LATANOPROST 1 DROP: 50 SOLUTION OPHTHALMIC at 21:29

## 2025-04-28 RX ADMIN — LOSARTAN POTASSIUM 12.5 MG: 25 TABLET, FILM COATED ORAL at 09:32

## 2025-04-28 RX ADMIN — DORZOLAMIDE HYDROCHLORIDE AND TIMOLOL MALEATE 1 DROP: 20; 5 SOLUTION OPHTHALMIC at 21:29

## 2025-04-28 RX ADMIN — BRIMONIDINE TARTRATE 1 DROP: 2 SOLUTION/ DROPS OPHTHALMIC at 15:15

## 2025-04-28 RX ADMIN — HYDRALAZINE HYDROCHLORIDE 10 MG: 10 TABLET ORAL at 12:32

## 2025-04-28 RX ADMIN — ATORVASTATIN CALCIUM 40 MG: 40 TABLET, FILM COATED ORAL at 21:27

## 2025-04-28 RX ADMIN — BRIMONIDINE TARTRATE 1 DROP: 2 SOLUTION/ DROPS OPHTHALMIC at 21:29

## 2025-04-28 RX ADMIN — BRIMONIDINE TARTRATE 1 DROP: 2 SOLUTION/ DROPS OPHTHALMIC at 09:32

## 2025-04-28 RX ADMIN — ASPIRIN 81 MG: 81 TABLET ORAL at 09:32

## 2025-04-28 ASSESSMENT — ACTIVITIES OF DAILY LIVING (ADL)
ADLS_ACUITY_SCORE: 60
ADLS_ACUITY_SCORE: 58
ADLS_ACUITY_SCORE: 69
ADLS_ACUITY_SCORE: 58
ADLS_ACUITY_SCORE: 69
ADLS_ACUITY_SCORE: 58
ADLS_ACUITY_SCORE: 69
ADLS_ACUITY_SCORE: 69
ADLS_ACUITY_SCORE: 58
ADLS_ACUITY_SCORE: 69
ADLS_ACUITY_SCORE: 58
ADLS_ACUITY_SCORE: 69
ADLS_ACUITY_SCORE: 69
ADLS_ACUITY_SCORE: 58
ADLS_ACUITY_SCORE: 69
ADLS_ACUITY_SCORE: 69

## 2025-04-28 NOTE — UTILIZATION REVIEW
Concurrent stay review; Secondary Review Determination -         Under the authority of the Utilization Management Committee, the utilization review process indicated a secondary review on the above patient.  The review outcome is based on review of the medical records, discussions with staff, and applying clinical experience noted on the date of the review.        (x) Outpatient shelter status is appropriate       RATIONALE FOR DETERMINATION:   74-year-old female with history of hypertension, CVA, glaucoma, admitted after mechanical fall.  Imaging shows no evidence of injury, concern for hydrocephalus, needs outpatient workup.  Therapy recommend TCU placement, psychiatry evaluated for competency and is able to make her own decisions, now waiting for placement to home with family versus TCU, does not meet criteria for inpatient stay, recommend [facility] care, communicated to Dr. Zapien    Patient delayed discharge is related to disposition, there is no medical necessity for inpatient admission at the time of this review. If there is a change in patient status, please resend for review.    The information on this document is developed by the utilization review team in order for the business office to ensure compliance.  This only denotes the appropriateness of proper admission status and does not reflect the quality of care rendered.       The definitions of Inpatient Status and Observation Status used in making the determination above are those provided in the CMS Coverage Manual, Chapter 1 and Chapter 6, section 70.4.       Sincerely,    Jose Salmon MD

## 2025-04-28 NOTE — PLAN OF CARE
0482 - 0444      Problem: Adult Inpatient Plan of Care  Goal: Plan of Care Review  Description: The Plan of Care Review/Shift note should be completed every shift.  The Outcome Evaluation is a brief statement about your assessment that the patient is improving, declining, or no change.  This information will be displayed automatically on your shiftnote.  Flowsheets (Taken 4/28/2025 0118)  Plan of Care Reviewed With: patient  Overall Patient Progress: no change  Goal: Absence of Hospital-Acquired Illness or Injury  Intervention: Identify and Manage Fall Risk  Recent Flowsheet Documentation  Taken 4/28/2025 0100 by Shantal Hodge RN  Safety Promotion/Fall Prevention:   assistive device/personal items within reach   activity supervised   increased rounding and observation   clutter free environment maintained  Intervention: Prevent Skin Injury  Recent Flowsheet Documentation  Taken 4/28/2025 0100 by Shantal Hodge RN  Body Position: position changed independently  Skin Protection: adhesive use limited  Intervention: Prevent and Manage VTE (Venous Thromboembolism) Risk  Recent Flowsheet Documentation  Taken 4/28/2025 0100 by Shantal Hodge RN  VTE Prevention/Management: SCDs off (sequential compression devices)  Intervention: Prevent Infection  Recent Flowsheet Documentation  Taken 4/28/2025 0100 by Shantal Hodge RN  Infection Prevention:   hand hygiene promoted   rest/sleep promoted   single patient room provided  Goal: Optimal Comfort and Wellbeing  Outcome: Progressing  Intervention: Provide Person-Centered Care  Recent Flowsheet Documentation  Taken 4/28/2025 0100 by Shantal Hodge RN  Trust Relationship/Rapport:   care explained   choices provided     Problem: Delirium  Goal: Improved Sleep  Outcome: Progressing         Goal Outcome Evaluation:    Plan of Care Reviewed With: patient  Overall Patient Progress: no change    Outcome Evaluation:   O2:  stable on RA.   Denies chest  pain and SOB.    Output: Voids spontaneously without difficulty to bathroom    Last BM: denies abdominal discomfort.    Activity: Assist of 1 with GB and walker   Skin: Visible skin intact   Pain: denies   CMS: AOx4.   pt able to make needs known.    Dressing: N/a   Diet: Regular diet.    LDA: No PIV   Equipment: IV pole, personal belongings,    Plan: Continue with plan of care.   Call light within reach,    Additional Info: Frequent check

## 2025-04-28 NOTE — DISCHARGE SUMMARY
[facility]  Hospitalist Discharge Summary      Date of Admission:  4/23/2025  Date of Discharge:  4/28/2025  Discharging Provider: Alayna Zapien MD  Discharge Service: Hospitalist Service, GOLD TEAM 17    Discharge Diagnoses     Fall  Unsteadiness  Intermittent confusion  Ventriculomegaly  Blindness right eye   HTN  Hx CVA  Hypokalemia  Hyperglycemia  Hx subacute angle-closure glaucoma     Clinically Significant Risk Factors          Follow-ups Needed After Discharge   Follow-up Appointments       Follow Up and recommended labs and tests      Follow up with Nursing home physician    Also will need referral to see oculopastics for evisceration/enucleation evaluation n.              Discharge Disposition   Discharged to nursing home  Condition at discharge: Stable    Hospital Course     Rolanda Molina is a 74 year old female admitted on 4/23/2025. 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        4/28  - patient  has been resistant to go to TCU   - she was seen by psychiatry and is able to make her own decisions   - now agreeable to TCU so can discharge  as soon as place found , however now as it turns out she leiva snot have TCU coverage     - utilization management recommended to place on out patient  long-term status            Fall  Unsteadiness  Intermittent confusion  Ventriculomegaly  Blindness right eye   Patient reports mechanical fall after slipping off side of bed at home.  Denies hitting her head.  CT head and C-spine without evidence of injury.  No clear syncopal symptoms however her inconsistent recollection of the timeline makes this assessment more challenging.  Denies headache, dizziness, chest pain.  Notably, CT head redemonstrates known ventriculomegaly. Patient denies incontinence, but was soaked with urine when she was found down (this may have been  situational).     - Neurology consultation appreciated.  Per neurology:  There has been some question in the past whether she may have normal pressure hydrocephalus, though I do appreciate a significant degree of atrophy on her head imaging, and I have a higher suspicion for ex-vacuo dilatation.  I do not suspect that she would derive benefit from a ventricular drain, though this could be further pursued in the outpatient setting in conjunction with neurosurgery and would require a lumbar drain trial, if the patient and her family felt strongly interested in this.     Examination is also notable for some weakness with knee flexion as well as hip flexion both of which I suspect to be due to deconditioning.      - also also did convey neurology's recommendations    -B12, folate: Normal.  A1c 5.9.  -Follow-up immunofixation labs.    -Will need updated referral for outpatient neurology   - PT/OT evaluations: Recommend TCU . Social work consulted..  - Up with assist  - physical therapy  OR rec TCU but patient  family declined, sounds like  patient  can be with her sister  who lives in [place] but needs to be able to shawna stairs    - I spoke with Bushra her daughter and seemed that patient  was better today , they are discussing next steps weather patient  will go and be with her sister   - 4/26 I spoke with patient 's daughter  Bushra    - patient  does not  qualify fr TCU as needs 3 inpatient stays        HTN  Previously on losartan and as needed hydralazine at home.  Patient and her daughter report that she stopped taking these potentially months ago after the [facility] of her PCP.  Hypertensive in ED but no evidence of endorgan damage     - jo t PTA losartan 12.5 mg daily: Titrate as needed.  - Continue as needed oral hydralazine for now,          Hx CVA  During workup for confusion and fall 2024, MRI showed evidence of multifocal strokes suggestive of embolic disease.  She was started on aspirin and Plavix with  plan for close neurology follow-up, however she was unable to get in for this and has not been taking aspirin or Plavix anytime recently.  No focal neurological deficits on exam currently.  - was started on  aspirin 81 mg daily.   - Continue PTA atorvastatin     Hypokalemia  - Replaced per  protocol        Hyperglycemia  - HgA1c was 5.9     Hx subacute angle-closure glaucoma  - Continue PTA eyedrops  - patient  was to see oculopastics for evisceration/enucleation evaluation             Consultations This Hospital Stay   PHYSICAL THERAPY ADULT IP CONSULT  OCCUPATIONAL THERAPY ADULT IP CONSULT  NEUROLOGY GENERAL ADULT IP CONSULT  CARE MANAGEMENT / SOCIAL WORK IP CONSULT  PSYCHIATRY IP CONSULT  PHYSICAL THERAPY ADULT IP CONSULT  OCCUPATIONAL THERAPY ADULT IP CONSULT    Code Status   No CPR- Pre-arrest intubation OK    Time Spent on this Encounter   I, Alayna Zapien MD, personally saw the patient today and spent greater than 30 minutes discharging this patient.       Alayna Zapien MD  [facility] MED SURG  Critical access hospital[facility] [address]  Phone: [phone]  Fax: [phone]  ______________________________________________________________________    Physical Exam   Vital Signs: Temp: 98  F (36.7  C) Temp src: Oral BP: (!) 151/99 Pulse: 72   Resp: 20 SpO2: 97 % O2 Device: None (Room air)    Weight: 163 lbs 0 oz      General appearence: awake alert  in no apparent distress        NECK : supple  RESPIRATORY: lungs clear    CARDIOVASCULAR:S1 S2 regular rate and rhythm, no rubs gallops or murmurs appreciated  GASTROINTESTINAL:soft, non-distended , non-tender , + bowel sounds,   SKIN: warm and dry, no mottling noted   NEUROLOGIC; awake alert , speech clear   EXTREMITIES: no clubbing, cyanosis or edema , moves all extremity,     Primary Care Physician   Physician No Ref-Primary    Discharge Orders      General info for SNF    Length of Stay Estimate: Short Term Care: Estimated # of Days  <30  Condition at Discharge: Declining  Level of care:skilled   Rehabilitation Potential: Fair  Admission H&P remains valid and up-to-date: Yes  Recent Chemotherapy: N/A  Use Nursing Home Standing Orders: Yes     Mantoux instructions    Give two-step Mantoux (PPD) Per Facility Policy Yes     Follow Up and recommended labs and tests    Follow up with Nursing home physician    Also will need referral to see oculopastics for evisceration/enucleation evaluation n.     Reason for your hospital stay    You were hospitalized after fall , you still need ongoing physical therapy  to get stronger     Activity - Up with nursing assistance     Physical Therapy Adult Consult    Evaluate and treat as clinically indicated.    Reason:  weakness     Occupational Therapy Adult Consult    Evaluate and treat as clinically indicated.    Reason:  weakness     Fall precautions     Diet    Follow this diet upon discharge: Current Diet:Orders Placed This Encounter      Regular Diet Adult       Significant Results and Procedures   Most Recent 3 CBC's:  Recent Labs   Lab Test 04/23/25  2049 10/19/24  0547 10/18/24  1143   WBC 7.7 9.4 7.1   HGB 12.5 14.3 15.2   MCV 80 81 81    213 206     Most Recent 3 BMP's:  Recent Labs   Lab Test 04/28/25  1219 04/28/25  0834 04/25/25  1201 04/25/25  0930 04/25/25  0736 04/25/25  0151 04/24/25  0759 04/23/25 2049 11/27/24  1013 10/19/24  0547   NA  --   --   --   --   --   --   --  137 143 138   POTASSIUM  --   --   --   --  4.2  --  3.8 3.3* 4.4 3.7   CHLORIDE  --   --   --   --   --   --   --  102 108* 104   CO2  --   --   --   --   --   --   --  24 26 20*   BUN  --   --   --   --   --   --   --  11.4 12.8 8.6   CR  --   --   --   --   --   --   --  0.83 0.95 0.93   ANIONGAP  --   --   --   --   --   --   --  11 9 14   DONNA  --   --   --   --   --   --   --  9.4 9.2 9.1   * 109* 93   < >  --    < >  --  99 74 108*    < > = values in this interval not displayed.     Most Recent 2  LFT's:  Recent Labs   Lab Test 04/23/25 2049 11/27/24  1013   AST 20 20   ALT 10 14   ALKPHOS 84 104   BILITOTAL 0.4 0.2   ,   Results for orders placed or performed during the hospital encounter of 04/23/25   CT Head w/o Contrast    Narrative    EXAM: CT HEAD W/O CONTRAST  LOCATION: [facility]  DATE: 4/23/2025    INDICATION: fall, AMS, HTN  COMPARISON: Head and neck CT angiogram 10/20/2024. Brain MRI 10/20/2024  TECHNIQUE: Routine CT Head without IV contrast. Multiplanar reformats. Dose reduction techniques were used.    FINDINGS:  INTRACRANIAL CONTENTS: No intracranial hemorrhage. Mild diffuse cerebral parenchymal volume loss. No midline shift. The basilar cisterns are patent. Small chronic infarcts involving the bilateral corona radiata and basal ganglia. Stable mild to   moderately dilated lateral and third ventricles. There is crowding of the vertex. The patient's known small extra-axial mass lesion involving the left frontal convexity is better seen on the prior brain MRI. No CT evidence for an acute infarct.    VISUALIZED ORBITS/SINUSES/MASTOIDS: Prior left cataract surgery. Visualized portions of the orbits are otherwise unremarkable. Mild chronic mucosal thickening of the left maxillary sinus. No middle ear or mastoid effusion.    BONES/SOFT TISSUES: No acute abnormality.      Impression    IMPRESSION:  1.  No intracranial hemorrhage, intra-axial mass lesions, or CT evidence for an acute infarct.  2.  Mild diffuse cerebral parenchymal volume loss. Presumed chronic hypertensive/microvascular ischemic white matter changes.  3.  Stable mild to moderately dilated lateral and third ventricles. There is crowding of the vertex. Please correlate with clinical findings of normal pressure hydrocephalus.  4.  The patient's known small extra-axial mass lesion involving the left frontal convexity is better seen on the prior brain MRI.     CT Cervical Spine w/o Contrast     Narrative    EXAM: CT CERVICAL SPINE W/O CONTRAST  LOCATION: [facility]  DATE: 4/23/2025    INDICATION: Fall, unknown head strike, AMS  COMPARISON: None.  TECHNIQUE: Routine CT Cervical Spine without IV contrast. Multiplanar reformats. Dose reduction techniques were used.    FINDINGS:  VERTEBRA: Reversal of the usual cervical lordosis. Flowing ventral osteophytes with ankylosis from C4 to C6. No acute compression fracture or posttraumatic subluxation.     CANAL/FORAMINA: Multilevel spondylosis without high grade canal stenosis.    PARASPINAL: No prevertebral edema.      Impression    IMPRESSION:  1.  No acute cervical spine fracture.     XR Chest 2 Views    Narrative    EXAM: XR CHEST 2 VIEWS  LOCATION: [facility]  DATE: 4/23/2025    INDICATION: HTN, fall  COMPARISON: Chest radiograph 10/18/2024.      Impression    IMPRESSION: Poor inspiration with bronchovascular crowding. No focal consolidation, pleural effusion or pneumothorax. Prominent heart size is likely due to low lung volumes. No displaced rib fracture.       Discharge Medications   Current Discharge Medication List        CONTINUE these medications which have CHANGED    Details   atorvastatin (LIPITOR) 40 MG tablet Take 1 tablet (40 mg) by mouth every evening.    Associated Diagnoses: Cerebrovascular accident (CVA) due to embolism of other precerebral artery (H)           CONTINUE these medications which have NOT CHANGED    Details   brimonidine (ALPHAGAN) 0.2 % ophthalmic solution Place 1 drop into the right eye 3 times daily.  Qty: 15 mL, Refills: 2    Associated Diagnoses: Chronic primary angle-closure glaucoma of right eye, severe stage      dorzolamide-timolol (COSOPT) 2-0.5 % ophthalmic solution Place 1 drop into both eyes 2 times daily.  Qty: 10 mL, Refills: 3    Associated Diagnoses: Other glaucoma of right eye      hydrALAZINE (APRESOLINE) 10 MG tablet TAKE  1 TABLET BY MOUTH EVERY 4 HOURS AS NEEDED FOR HIGH BLOOD PRESSURE  Qty: 90 tablet, Refills: 0    Associated Diagnoses: Hypertension, unspecified type      latanoprost (XALATAN) 0.005 % ophthalmic solution Place 1 drop into the right eye at bedtime.  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  Qty: 45 tablet, Refills: 3    Associated Diagnoses: Hypertension, unspecified type           STOP taking these medications       prednisoLONE acetate (PRED FORTE) 1 % ophthalmic suspension Comments:   Reason for Stopping:         sodium chloride, PF, 0.9% PF flush Comments:   Reason for Stopping:         sodium chloride, PF, 0.9% PF flush Comments:   Reason for Stopping:             Allergies   Allergies   Allergen Reactions    Penicillins      Swelling of arms and legs

## 2025-04-28 NOTE — PLAN OF CARE
Goal Outcome Evaluation:       Alert and oriented X4.    Assist of one with walker.     No IV access.     Right eye blindness.     Denies pain.     Continue plan of care.

## 2025-04-28 NOTE — PROGRESS NOTES
Care Management Follow Up    Length of Stay (days): 0    Expected Discharge Date: 04/29/2025     Concerns to be Addressed: discharge planning     Patient plan of care discussed at interdisciplinary rounds: Yes    Anticipated Discharge Disposition: Transitional Care              Anticipated Discharge Services: Transportation Services, County Worker  Anticipated Discharge DME: None    Patient/family educated on Medicare website which has current facility and service quality ratings: yes  Education Provided on the Discharge Plan: Yes  Patient/Family in Agreement with the Plan: no (Patient agreeable to referrals being sent for TCU but is apprehensive. She maintains that she wants to go to her sister's home. She states that she can do stairs but has not passed stairs with PT.)    Referrals Placed by CM/SW: Post Acute Facilities  Private pay costs discussed: Not applicable    Discussed  Partnership in Safe Discharge Planning  document with patient/family: No     Handoff Completed: No, handoff not indicated or clinically appropriate    Additional Information:  Per provider rounds, pt medically ready for discharge. Current recommendation for TCU but pt was adamantly against. Writer spoke with pt's daughter, Bushra, who explained pt's children work during the day and pt will not have anyone around during those times. Additionally, pt not a candidate for home care as she does not have a PCP to write ongoing home care orders.     Writer then met with pt at bedside to discuss safe discharge options. Eventually, pt was agreeable to short TCU stay.     Writer reached out to [facility] to see if they had reviewed pt. Unfortunately, pt's insurance requires a 3-night inpatient stay for TCU coverage.     Writer reached back out to pt's daughter, Bushra, to inform. Bushra expressed understanding and will pick pt up tomorrow morning at 1100. Provider notified.     Next Steps: BRINDA Mcintosh, RN   Nurse Coordinator    6  Med/Surg  Phone: [phone]    Social Work and Care Management Department     SEARCHABLE in [facility] - search CARE COORDINATOR   [facility] ([id number]) Saturday & Sunday; ([id number]) [facility] Recognized Holidays   Units: 6 Med Surg Vocera & 8 Med Surg Vocera Pager [phone]

## 2025-04-28 NOTE — CONSULTS
"      Initial Psychiatric Consult   Consult date: April 28, 2025         Reason for Consult, requesting source:    Capacity  Requesting source: Alayna Zapien    Labs and imaging reviewed. Patient seen and evaluated by FAYE Segura CNP          HPI:   Rolanda Molina is a 74 year old female admitted on 4/23/2025. 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 Per PT, patient was not able to complete the stairs necessary to go to her sister's home in [place]. PT states that she was unsafe attempting to navigate 1 stair with mod assist from therapist.     Patient tells me she came here after a fall ,understands that the physical therapist is recommending going to the TCU, and that she would rather go to her sisters who can take care of her. States there's a \"man\" who can help her up the stairs and there are plans to build a ramp to take care of the \"stairs problem.\" She plans to have her son take her to [place]. Understands risks of not going to TCU and working with PT are risks of recurrent falls which has multiple consequences including broken bones and possibly death. She acknoweldesgs this and still wants to go to her sisters. She asked insightful questions about what TCU entails. Alert and orietned x4. Denies depression, anxiety. Appetite and sleep ok per pt report.         Past Psychiatric History:   No formal psychiatric history, treatment, or medications         Substance Use and History:   Denies recent use        Past Medical History:   PAST MEDICAL HISTORY:   Past Medical History:   Diagnosis Date    Hypertension        PAST SURGICAL HISTORY:   Past Surgical History:   Procedure Laterality Date    CATARACT IOL, RT/LT      COLONOSCOPY N/A 08/30/2021    Procedure: Colonoscopy, With Polypectomy And Biopsy;  Surgeon: Federico Vital MD;  Location:  GI    PHACOEMULSIFICATION WITH STANDARD INTRAOCULAR LENS IMPLANT " Left 12/23/2024    Procedure: LEFT EYE COMPLEX PHACOEMULSIFICATION, CATARACT, WITH STANDARD INTRAOCULAR LENS IMPLANT INSERTION;  Surgeon: Ashu Grey MD;  [place] [facility] [place]             Family History:   FAMILY HISTORY:   Family History   Problem Relation Age of Onset    Glaucoma Mother     Diabetes Mother         Type II    Thyroid Cancer Mother     Glaucoma Sister     Colon Cancer No family hx of     Macular Degeneration No family hx of        Family Psychiatric History: denies        Social History:   SOCIAL HISTORY:   Social History     Tobacco Use    Smoking status: Every Day    Smokeless tobacco: Never    Tobacco comments:     starting smoking at age 26   Substance Use Topics    Alcohol use: Yes                Physical ROS:   The 10 point Review of Systems is negative other than noted in the HPI or here.           Medications:     Current Facility-Administered Medications   Medication Dose Route Frequency Provider Last Rate Last Admin    aspirin EC tablet 81 mg  81 mg Oral Daily Rafiq Reynoso MD   81 mg at 04/28/25 0932    atorvastatin (LIPITOR) tablet 40 mg  40 mg Oral QPM Bill Jordan MD   40 mg at 04/27/25 1943    brimonidine (ALPHAGAN) 0.2 % ophthalmic solution 1 drop  1 drop Right Eye TID Bill Jordan MD   1 drop at 04/28/25 0932    dorzolamide-timolol (COSOPT) ophthalmic solution 1 drop  1 drop Both Eyes BID Bill Jordan MD   1 drop at 04/28/25 0932    latanoprost (XALATAN) 0.005 % ophthalmic solution 1 drop  1 drop Right Eye At Bedtime Bill Jordan MD   1 drop at 04/27/25 2206    losartan (COZAAR) half-tab 12.5 mg  12.5 mg Oral Daily Bill Jordan MD   12.5 mg at 04/28/25 0932              Allergies:     Allergies   Allergen Reactions    Penicillins      Swelling of arms and legs          Labs:     Recent Results (from the past 48 hours)   Glucose by meter    Collection Time: 04/28/25  8:34 AM   Result Value Ref Range    GLUCOSE BY METER POCT 109 (H) 70 - 99  mg/dL          Physical and Psychiatric Examination:     /84 (BP Location: Right arm)   Pulse 70   Temp 98.1  F (36.7  C) (Oral)   Resp 18   Wt 73.9 kg (163 lb)   LMP  (LMP Unknown)   SpO2 98%   BMI 25.53 kg/m    Weight is 163 lbs 0 oz  Body mass index is 25.53 kg/m .    Physical Exam:  I have reviewed the physical exam as documented by by the medical team and agree with findings and assessment and have no additional findings to add at this time.    Mental Status Exam:    Appearance: awake, alert and adequately groomed  Attitude:  cooperative  Eye Contact:  good  Mood:  fine   Affect:  appropriate and in normal range and mood congruent  Speech:  clear, coherent  Language: Fluent in english   Psychomotor Behavior:  no evidence of tardive dyskinesia, dystonia, or tics  Thought Process:  logical, linear, and goal oriented  Associations:  no loose associations  Thought Content:  no evidence of suicidal ideation or homicidal ideation and no evidence of psychotic thought  Insight:  good  Judgement:  intact  Oriented to:  time, person, and place  Attention Span and Concentration:  intact  Recent and Remote Memory:  intact  Fund of Knowledge: Appropriate   Gait and Station: baseline                  DSM-5 Diagnosis:   Evaluation of capacity           Assessment:   Ella is a 74 year old female who presented following a fall. PT recommending TCU for concerns of safety on stairs, patient acknowledges and understands recommendations and is able to list possible consequences, asks appropriate follow up questions to guide her decision making, and is able to clearly state her choice. No overt psychosis, sheri, depression or substance use impairing her decision making at this time.     Capacity assessment:  This patient met criteria for capacity as evidenced by ALL FOUR criteria:   1) Remembering information provided about medical intervention indications, risks, benefits, alternatives.   2) Manipulating that  information in a rational way.   3) Evidencing a choice regarding the medical intervention.   4) Understanding consequences of the evidenced choice.     She does appear to retain capacity for discharge decisions at this time given her ability to comprehend recommendations and communicate a clear choice based in her values while acknowledging possible consequences.             Summary of Recommendations:     Patient retains capacity to decline TCU recommendation       Ly Butler, PMLAWANDAP-BC  Consult/Liaison Psychiatry   [facility]

## 2025-04-28 NOTE — PROGRESS NOTES
[facility]    Medicine Progress Note - Hospitalist Service, GOLD TEAM 17    Date of Admission:  4/23/2025    Assessment & Plan     Rolanda Molina is a 74 year old female admitted on 4/23/2025. 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       4/28  - patient  has been resistant to go to TCU   - she was seen by psychiatry and is able to make her own decisions   - now agreeable to TCU so can discharge  as soon as place found     If does not discharge  today to TCU then utilization management recommended to place on out patient  MCC status       Addendum  - now as it turns out patient  does need 3 inpatient stat to qualify for TCU  coverage       Discharge  orders placed, please update if anything changes !      Fall  Unsteadiness  Intermittent confusion  Ventriculomegaly  Blindness right eye   Patient reports mechanical fall after slipping off side of bed at home.  Denies hitting her head.  CT head and C-spine without evidence of injury.  No clear syncopal symptoms however her inconsistent recollection of the timeline makes this assessment more challenging.  Denies headache, dizziness, chest pain.  Notably, CT head redemonstrates known ventriculomegaly. Patient denies incontinence, but was soaked with urine when she was found down (this may have been situational).     - Neurology consultation appreciated.  Per neurology:  There has been some question in the past whether she may have normal pressure hydrocephalus, though I do appreciate a significant degree of atrophy on her head imaging, and I have a higher suspicion for ex-vacuo dilatation.  I do not suspect that she would derive benefit from a ventricular drain, though this could be further pursued in the outpatient setting in conjunction with neurosurgery and would require a lumbar drain trial, if the patient and her family  felt strongly interested in this.     Examination is also notable for some weakness with knee flexion as well as hip flexion both of which I suspect to be due to deconditioning.     -B12, folate: Normal.  A1c 5.9.  -Follow-up immunofixation labs.    -Will need updated referral for outpatient neurology   - PT/OT evaluations: Recommend TCU . Social work consulted..  - Up with assist  - physical therapy  OR rec TCU but patient  family declined, sounds like  patient  can be with her sister  who lives in [place] but needs to be able to shawna stairs    - I spoke with Bushra her daughter and seemed that patient  was better today , they are discussing next steps weather patient  will go and be with her sister   - 4/26 I spoke with patient 's daughter  Bushra  and daughter yobani that if TCU is recommended then they would be ok with TCU , if she can go home but if needs someone with her 24/7 then that would be with her sister in [place] and has stairs to get into the house. Kids are all working  so cannot be with her    - also also did convey neurology's recommendations     HTN  Previously on losartan and as needed hydralazine at home.  Patient and her daughter report that she stopped taking these potentially months ago after the [facility] of her PCP.  Hypertensive in ED but no evidence of endorgan damage     - Restart PTA losartan 12.5 mg daily: Titrate as needed.  - Continue as needed oral hydralazine for now,  - Monitor blood pressure        Hx CVA  During workup for confusion and fall 2024, MRI showed evidence of multifocal strokes suggestive of embolic disease.  She was started on aspirin and Plavix with plan for close neurology follow-up, however she was unable to get in for this and has not been taking aspirin or Plavix anytime recently.  No focal neurological deficits on exam currently.  - was started on  aspirin 81 mg daily.   - Continue PTA atorvastatin     Hypokalemia  - Replacement protocol       Hyperglycemia  -  HgA1c was 5.9    Hx subacute angle-closure glaucoma  - Continue PTA eyedrops  - patient  was to see oculopastics for evisceration/enucleation evaluation            Observation Goals: -diagnostic tests and consults completed and resulted, -vital signs normal or at patient baseline, -returns to baseline functional status, -safe disposition plan has been identified, Nurse to notify provider when observation goals have been met and patient is ready for discharge.  Diet: Regular Diet Adult    DVT Prophylaxis: Pneumatic Compression Devices  Ornelas Catheter: Not present  Lines: None     Cardiac Monitoring: None  Code Status: No CPR- Pre-arrest intubation OK      Clinically Significant Risk Factors Present on Admission                   # Hypertension: Noted on problem list               # Financial/Environmental Concerns: none         Social Drivers of Health    Tobacco Use: High Risk (4/23/2025)    Patient History     Smoking Tobacco Use: Every Day     Smokeless Tobacco Use: Never          Disposition Plan     Medically Ready for Discharge: Ready Now needs TCU            Alayna Zapien MD  Hospitalist Service, GOLD TEAM 17  [facility]  Securely message with [employer] (more info)  Text page via [employer] Paging/Directory   See signed in provider for up to date coverage information  ______________________________________________________________________    Interval History   Doing ok, denies any chest pain  no shortness of breath  no nausea vomiting     Physical Exam   Vital Signs: Temp: 98.4  F (36.9  C) Temp src: Oral BP: 138/60 Pulse: 81   Resp: 18 SpO2: 98 % O2 Device: None (Room air)    Weight: 163 lbs 0 oz  General appearence: awake alert  in no apparent distress      NECK : supple  RESPIRATORY: lungs clear    CARDIOVASCULAR:S1 S2 regular rate and rhythm, no rubs gallops or murmurs appreciated  GASTROINTESTINAL:soft, non-distended , non-tender , + bowel sounds,   SKIN: warm and  dry, no mottling noted   NEUROLOGIC; awake alert , speech clear   EXTREMITIES: no clubbing, cyanosis or edema , moves all extremity,        Data         Imaging results reviewed over the past 24 hrs:   No results found for this or any previous visit (from the past 24 hours).

## 2025-04-28 NOTE — H&P
[facility]    History and Physical - Hospitalist Service       Date of Admission:  4/28/2025     Assessment & Plan        Rolanda Molina is a 74 year old female admitted on 4/23/2025. 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        4/28  - patient  has been resistant to go to TCU   - she was seen by psychiatry and is able to make her own decisions   - now agreeable to TCU so can discharge  as soon as place found , however now as it turns out she leiva snot have TCU coverage      - utilization management recommended to place on out patient  long-term status so now doing admission under long-term status            Fall  Unsteadiness  Intermittent confusion  Ventriculomegaly  Blindness right eye   Patient reports mechanical fall after slipping off side of bed at home.  Denies hitting her head.  CT head and C-spine without evidence of injury.  No clear syncopal symptoms however her inconsistent recollection of the timeline makes this assessment more challenging.  Denies headache, dizziness, chest pain.  Notably, CT head redemonstrates known ventriculomegaly. Patient denies incontinence, but was soaked with urine when she was found down (this may have been situational).     - Neurology consultation appreciated.  Per neurology:  There has been some question in the past whether she may have normal pressure hydrocephalus, though I do appreciate a significant degree of atrophy on her head imaging, and I have a higher suspicion for ex-vacuo dilatation.  I do not suspect that she would derive benefit from a ventricular drain, though this could be further pursued in the outpatient setting in conjunction with neurosurgery and would require a lumbar drain trial, if the patient and her family felt strongly interested in this.     Examination is also notable for some weakness with knee flexion  as well as hip flexion both of which I suspect to be due to deconditioning.      - also also did convey neurology's recommendations     -B12, folate: Normal.  A1c 5.9.  -Follow-up immunofixation labs.    -Will need updated referral for outpatient neurology   - PT/OT evaluations: Recommend TCU . Social work consulted..  - Up with assist  - physical therapy  OR rec TCU but patient  family declined, sounds like  patient  can be with her sister  who lives in [place] but needs to be able to shawna stairs    - I spoke with Bushra her daughter and seemed that patient  was better today , they are discussing next steps weather patient  will go and be with her sister   - 4/26 I spoke with patient 's daughter  Bushra    - patient  does not  qualify fr TCU as needs 3 inpatient stays         HTN  Previously on losartan and as needed hydralazine at home.  Patient and her daughter report that she stopped taking these potentially months ago after the [facility].  Hypertensive in ED but no evidence of endorgan damage     - jo t PTA losartan 12.5 mg daily: Titrate as needed.  - Continue as needed oral hydralazine for now,           Hx CVA  During workup for confusion and fall 2024, MRI showed evidence of multifocal strokes suggestive of embolic disease.  She was started on aspirin and Plavix with plan for close neurology follow-up, however she was unable to get in for this and has not been taking aspirin or Plavix anytime recently.  No focal neurological deficits on exam currently.  - was started on  aspirin 81 mg daily.   - Continue PTA atorvastatin     Hypokalemia  - Replaced per  protocol        Hyperglycemia  - HgA1c was 5.9     Hx subacute angle-closure glaucoma  - Continue PTA eyedrops  - patient  was to see oculopastics for evisceration/enucleation evaluation                 Diet:  as tolerated   DVT Prophylaxis: Ambulate every shift  Ornelas Catheter: Not present  Lines: None     Cardiac Monitoring: None  Code Status:   no CPR pre arrest intubation ok     Clinically Significant Risk Factors Present on Admission                   # Hypertension: Noted on problem list               # Financial/Environmental Concerns:           Disposition Plan     Medically Ready for Discharge: Anticipated Tomorrow           Alayna Zapien MD  Hospitalist Service  [facility]  Securely message with [employer] (more info)  Text page via [facility] Paging/Directory     ______________________________________________________________________    Chief Complaint   Weakness     History is obtained from the patient's parent(s)    History of Present Illness Rolanda Molina is a 74 year old female admitted on 4/23/2025. 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     Past Medical History    Past Medical History:   Diagnosis Date    Hypertension        Past Surgical History   Past Surgical History:   Procedure Laterality Date    CATARACT IOL, RT/LT      COLONOSCOPY N/A 08/30/2021    Procedure: Colonoscopy, With Polypectomy And Biopsy;  Surgeon: Federico Vital MD;  Location: [facility]    PHACOEMULSIFICATION WITH STANDARD INTRAOCULAR LENS IMPLANT Left 12/23/2024    Procedure: LEFT EYE COMPLEX PHACOEMULSIFICATION, CATARACT, WITH STANDARD INTRAOCULAR LENS IMPLANT INSERTION;  Surgeon: Ashu Grey MD;  Location: [facility] OR       Prior to Admission Medications   Cannot display prior to admission medications because the patient has not been admitted in this contact.           Physical Exam   Vital Signs:                    Weight: 0 lbs 0 oz  NECK : supple  RESPIRATORY: lungs clear    CARDIOVASCULAR:S1 S2 regular rate and rhythm, no rubs gallops or murmurs appreciated  GASTROINTESTINAL:soft, non-distended , non-tender , + bowel sounds,   SKIN: warm and dry, no mottling noted   NEUROLOGIC; awake alert , speech clear   EXTREMITIES: no  clubbing, cyanosis or edema , moves all extremity,        Data         Imaging results reviewed over the past 24 hrs:   No results found for this or any previous visit (from the past 24 hours).

## 2025-04-28 NOTE — PHARMACY-ADMISSION MEDICATION HISTORY
Admission medication history completed at [facility]. Please see Pharmacist Admission Medication History note from 4/24/2025.

## 2025-04-29 VITALS
TEMPERATURE: 97.6 F | HEART RATE: 79 BPM | SYSTOLIC BLOOD PRESSURE: 158 MMHG | OXYGEN SATURATION: 96 % | RESPIRATION RATE: 18 BRPM | DIASTOLIC BLOOD PRESSURE: 106 MMHG

## 2025-04-29 PROCEDURE — 250N000013 HC RX MED GY IP 250 OP 250 PS 637: Performed by: INTERNAL MEDICINE

## 2025-04-29 PROCEDURE — 99239 HOSP IP/OBS DSCHRG MGMT >30: CPT | Performed by: STUDENT IN AN ORGANIZED HEALTH CARE EDUCATION/TRAINING PROGRAM

## 2025-04-29 RX ORDER — BRIMONIDINE TARTRATE 2 MG/ML
1 SOLUTION/ DROPS OPHTHALMIC 3 TIMES DAILY
Qty: 15 ML | Refills: 2 | Status: SHIPPED | OUTPATIENT
Start: 2025-04-29

## 2025-04-29 RX ORDER — LATANOPROST 50 UG/ML
1 SOLUTION/ DROPS OPHTHALMIC AT BEDTIME
Qty: 5 ML | Refills: 2 | Status: SHIPPED | OUTPATIENT
Start: 2025-04-29

## 2025-04-29 RX ORDER — ASPIRIN 81 MG/1
81 TABLET, COATED ORAL DAILY
Qty: 60 TABLET | Refills: 2 | Status: SHIPPED | OUTPATIENT
Start: 2025-04-29

## 2025-04-29 RX ORDER — LOSARTAN POTASSIUM 25 MG/1
12.5 TABLET ORAL DAILY
Qty: 50 TABLET | Refills: 2 | Status: SHIPPED | OUTPATIENT
Start: 2025-04-29

## 2025-04-29 RX ORDER — DORZOLAMIDE HYDROCHLORIDE AND TIMOLOL MALEATE 20; 5 MG/ML; MG/ML
1 SOLUTION/ DROPS OPHTHALMIC 2 TIMES DAILY
Qty: 10 ML | Refills: 2 | Status: SHIPPED | OUTPATIENT
Start: 2025-04-29

## 2025-04-29 RX ORDER — ATORVASTATIN CALCIUM 40 MG/1
40 TABLET, FILM COATED ORAL EVERY EVENING
Qty: 60 TABLET | Refills: 2 | Status: SHIPPED | OUTPATIENT
Start: 2025-04-29

## 2025-04-29 RX ADMIN — BRIMONIDINE TARTRATE 1 DROP: 2 SOLUTION/ DROPS OPHTHALMIC at 08:40

## 2025-04-29 RX ADMIN — ASPIRIN 81 MG CHEWABLE TABLET 81 MG: 81 TABLET CHEWABLE at 08:40

## 2025-04-29 RX ADMIN — DORZOLAMIDE HYDROCHLORIDE AND TIMOLOL MALEATE 1 DROP: 20; 5 SOLUTION OPHTHALMIC at 08:40

## 2025-04-29 RX ADMIN — LOSARTAN POTASSIUM 12.5 MG: 25 TABLET, FILM COATED ORAL at 08:40

## 2025-04-29 ASSESSMENT — ACTIVITIES OF DAILY LIVING (ADL)
ADLS_ACUITY_SCORE: 60
ADLS_ACUITY_SCORE: 60
ADLS_ACUITY_SCORE: 64
ADLS_ACUITY_SCORE: 60
ADLS_ACUITY_SCORE: 64

## 2025-04-29 NOTE — PLAN OF CARE
[id number]    Goal Outcome Evaluation:      Plan of Care Reviewed With: patient  Overall Patient Progress: no change  Overall Patient Progress: no change    VS: Stable on RA   Pain: Denies   Neuro: A&O x4. Generalized weakness   Activity: Ax1 with walker/GB   Diet: Regular with thin liquids. Takes pills whole    GI/: Mixed continent of B&B. No BM this shift   Skin: No skin issues    Shift Notes: Pt slept through most of the night and is able to make needs known. Pending discharge to TCU. No acute changes this shift

## 2025-04-29 NOTE — PROGRESS NOTES
[id number]        O2: Stable on RA Denies SOB   Output: Continent of bowel and bladder, voids spontaneously w/o difficulty   Last BM: 4/27   Activity: Assist of 1 with walker and gait belt   Skin: WDL   Pain: Denies              CMS: A&Ox4 denies chest pain, n/v, numbness/tingling, and dizziness   Dressing: none   Diet: regular   LDA: none   Equipment: Personal belongings   Plan: Call light in reach, continue POC   Additional Info:

## 2025-04-29 NOTE — CONSULTS
Care Management Discharge Note    Discharge Date: 04/29/2025       Discharge Disposition:  Home    Discharge Services:  None    Discharge DME:  None    Discharge Transportation:  Family    Private pay costs discussed: Not applicable    Does the patient's insurance plan have a 3 day qualifying hospital stay waiver?  No    PAS Confirmation Code:  NA  Patient/family educated on Medicare website which has current facility and service quality ratings:  NA    Education Provided on the Discharge Plan:  Yes  Persons Notified of Discharge Plans: Pt, family  Patient/Family in Agreement with the Plan:  Yes    Handoff Referral Completed: No, handoff not indicated or clinically appropriate    Additional Information:  Per provider rounds, pt medically ready for discharge today. Writer discussed TCU vs home care with pt and daughter. Pt does not qualify for either d/t observation status and lack of PCP. Pt and daughter Bushra expressed understanding. Writer emphasized importance of pt establishing a new PCP and the clinic can assist with home care once pt has been seen. Pt will also need a PCP for medication refills. Writer offered to send a referral to establish with a [facility] PCP but pt's daughter, Bushra declined as they have tried this in the past and pt does not answer her phone. Family will assist with scheduling an appointment.     Bushra is transporting pt home. No further Care Management needs at this time.     Colleen Mcintosh, RN   Nurse Coordinator    6 Med/Surg  Phone: [phone]    Social Work and Care Management Department     SEARCHABLE in [facility] - search CARE COORDINATOR   [facility] ([id number]) Saturday & Sunday; ([phone])  Recognized Holidays   Units: 6 Med Surg Vocera & 8 Med Surg Vocera Pager [phone]

## 2025-04-29 NOTE — DISCHARGE SUMMARY
[facility]  Hospitalist Discharge Summary      Date of Admission:  4/28/2025  Date of Discharge:  4/29/2025  Discharging Provider: Vivi Moffett MD  Discharge Service: Hospitalist Service, GOLD TEAM 17    Discharge Diagnoses   Fall  Unsteadiness  Intermittent confusion  Ventriculomegaly  Blindness right eye   HTN  Hx CVA  Hypokalemia  Hyperglycemia  Hx subacute angle-closure glaucoma    Clinically Significant Risk Factors          Follow-ups Needed After Discharge   - f/u with neurology as outpatient    Unresulted Labs Ordered in the Past 30 Days of this Admission       No orders found for last 31 day(s).        These results will be followed up by PCP    Discharge Disposition   Discharged to home  Condition at discharge: Stable    Hospital Course   Rolanda Molina is a 74 year old female admitted on 4/23/2025. 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 Pt was worked up by neurology and plan was to consider possible ventricular drain as an outpatient with NSGY referral if it was something pt and family were interested in. Pt was recommended to go to TCU, but did not meet criteria. She was transitioned to senior care care on 4/28. Discharged home on 4/29.   - neurology referral placed on d/c by Dr. Zapien      Consultations This Hospital Stay   CARE MANAGEMENT / SOCIAL WORK IP CONSULT  PHARMACY IP CONSULT    Code Status   No CPR- Pre-arrest intubation OK    Time Spent on this Encounter   I, Vivi Moffett MD, personally saw the patient today and spent greater than 30 minutes discharging this patient.       Vivi Moffett MD  [facility] MED SURG  [facility] [facility] [address]  Phone: [phone]  Fax: [phone]  ______________________________________________________________________    Physical Exam   Vital Signs: Temp: 97.6  F (36.4  C) Temp src:  Oral BP: (!) 158/106 (did it 2 times but still high) Pulse: 79   Resp: 18 SpO2: 96 % O2 Device: None (Room air)    Weight: 0 lbs 0 oz    General Appearance: NAD, sitting up in bed getting dressed  Respiratory: breathing comfortably  Cardiovascular: RRR, no m/r/g  Skin: no lesions, no bruising noted       Primary Care Physician   Physician No Ref-Primary    Discharge Orders      Hospital to Primary Care - Establish PCP Referral      Reason for your hospital stay    Ground level fall     Activity    Your activity upon discharge: activity as tolerated     Diet    Follow this diet upon discharge: Current Diet:Orders Placed This Encounter      Combination Diet Regular Diet       Significant Results and Procedures   Most Recent 3 CBC's:  Recent Labs   Lab Test 04/23/25  2049 10/19/24  0547 10/18/24  1143   WBC 7.7 9.4 7.1   HGB 12.5 14.3 15.2   MCV 80 81 81    213 206     Most Recent 3 BMP's:  Recent Labs   Lab Test 04/28/25  1219 04/28/25  0834 04/25/25  1201 04/25/25  0930 04/25/25  0736 04/25/25  0151 04/24/25  0759 04/23/25 2049 11/27/24  1013 10/19/24  0547   NA  --   --   --   --   --   --   --  137 143 138   POTASSIUM  --   --   --   --  4.2  --  3.8 3.3* 4.4 3.7   CHLORIDE  --   --   --   --   --   --   --  102 108* 104   CO2  --   --   --   --   --   --   --  24 26 20*   BUN  --   --   --   --   --   --   --  11.4 12.8 8.6   CR  --   --   --   --   --   --   --  0.83 0.95 0.93   ANIONGAP  --   --   --   --   --   --   --  11 9 14   DONNA  --   --   --   --   --   --   --  9.4 9.2 9.1   * 109* 93   < >  --    < >  --  99 74 108*    < > = values in this interval not displayed.     Most Recent 2 LFT's:  Recent Labs   Lab Test 04/23/25 2049 11/27/24  1013   AST 20 20   ALT 10 14   ALKPHOS 84 104   BILITOTAL 0.4 0.2       Discharge Medications   Current Discharge Medication List        CONTINUE these medications which have NOT CHANGED    Details   aspirin (ASA) 81 MG EC tablet Take 1 tablet (81 mg) by  mouth daily.  Qty: 30 tablet, Refills: 0    Associated Diagnoses: Cerebrovascular accident (CVA) due to embolism of other precerebral artery (H)      atorvastatin (LIPITOR) 40 MG tablet Take 1 tablet (40 mg) by mouth every evening.    Associated Diagnoses: Cerebrovascular accident (CVA) due to embolism of other precerebral artery (H)      brimonidine (ALPHAGAN) 0.2 % ophthalmic solution Place 1 drop into the right eye 3 times daily.  Qty: 15 mL, Refills: 2    Associated Diagnoses: Chronic primary angle-closure glaucoma of right eye, severe stage      dorzolamide-timolol (COSOPT) 2-0.5 % ophthalmic solution Place 1 drop into both eyes 2 times daily.  Qty: 10 mL, Refills: 3    Associated Diagnoses: Other glaucoma of right eye      hydrALAZINE (APRESOLINE) 10 MG tablet TAKE 1 TABLET BY MOUTH EVERY 4 HOURS AS NEEDED FOR HIGH BLOOD PRESSURE  Qty: 90 tablet, Refills: 0    Associated Diagnoses: Hypertension, unspecified type      latanoprost (XALATAN) 0.005 % ophthalmic solution Place 1 drop into the right eye at bedtime.  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  Qty: 45 tablet, Refills: 3    Associated Diagnoses: Hypertension, unspecified type           Allergies   Allergies   Allergen Reactions    Penicillins      Swelling of arms and legs

## 2025-04-29 NOTE — PLAN OF CARE
6MS DISCHARGE    D: Patient discharged to Home at home. Patient accompanied by Daughter.    I: Discharge prescriptions given to patient. All discharge medications and instructions reviewed with patient/daughter. Patient instructed to seek care if experiencing worsening symptoms, such as chest pain, headache, and shortness of breath. Other phone numbers to call with questions or concerns after discharge reviewed. No PIV . Education completed.    A: Patient verbalized understanding of discharge medications and instructions. Prescribed home medications given to patient.  Belongings returned to patient.    P: Patient to follow-up on primary care provider with primary care provider.

## 2025-04-29 NOTE — PLAN OF CARE
Occupational Therapy Discharge Summary    Reason for therapy discharge:    Discharged to home.    Progress towards therapy goal(s). See goals on Care Plan in [facility] electronic health record for goal details.  Goals partially met.  Barriers to achieving goals:   discharge from facility.    Therapy recommendation(s):    Continued therapy is recommended.  Rationale/Recommendations:  recommend continued HH vs OP OT to progress safety with ADLs/IADLs.

## 2025-05-01 ENCOUNTER — PATIENT OUTREACH (OUTPATIENT)
Dept: CARE COORDINATION | Facility: CLINIC | Age: 75
End: 2025-05-01
Payer: COMMERCIAL

## 2025-05-01 NOTE — PROGRESS NOTES
Connected Care Resource Center:   [facility] Resource [facility] Contact  [facility]/Voicemail     Clinical Data: Post-Discharge Outreach     Outreach attempted x 2.  Left message on patient's voicemail, providing [facility]'s central phone number of [phone] ([phone]) for questions/concerns and/or to schedule an appt with an [facility] provider, if they do not have a PCP.      Plan:  [facility] will do no further outreaches at this time.       KELSEA Del Castillo  Connected Care Resource [place], [facility]    *Connected Care Resource Team does NOT follow patient ongoing. Referrals are identified based on internal discharge reports and the outreach is to ensure patient has an understanding of their discharge instructions.

## 2025-05-20 ENCOUNTER — APPOINTMENT (OUTPATIENT)
Dept: CT IMAGING | Facility: CLINIC | Age: 75
End: 2025-05-20
Attending: PHYSICIAN ASSISTANT
Payer: COMMERCIAL

## 2025-05-20 ENCOUNTER — HOSPITAL ENCOUNTER (OUTPATIENT)
Facility: CLINIC | Age: 75
Setting detail: OBSERVATION
End: 2025-05-20
Attending: EMERGENCY MEDICINE | Admitting: STUDENT IN AN ORGANIZED HEALTH CARE EDUCATION/TRAINING PROGRAM
Payer: COMMERCIAL

## 2025-05-20 DIAGNOSIS — Z86.73 HISTORY OF CVA (CEREBROVASCULAR ACCIDENT): ICD-10-CM

## 2025-05-20 DIAGNOSIS — R62.7 FAILURE TO THRIVE IN ADULT: ICD-10-CM

## 2025-05-20 DIAGNOSIS — R41.0 DELIRIUM: ICD-10-CM

## 2025-05-20 DIAGNOSIS — M62.81 GENERALIZED MUSCLE WEAKNESS: ICD-10-CM

## 2025-05-20 DIAGNOSIS — R29.6 RECURRENT FALLS: Primary | ICD-10-CM

## 2025-05-20 LAB
ALBUMIN SERPL BCG-MCNC: 4.5 G/DL (ref 3.5–5.2)
ALP SERPL-CCNC: 94 U/L (ref 40–150)
ALT SERPL W P-5'-P-CCNC: 16 U/L (ref 0–50)
ANION GAP SERPL CALCULATED.3IONS-SCNC: 16 MMOL/L (ref 7–15)
AST SERPL W P-5'-P-CCNC: 21 U/L (ref 0–45)
B-OH-BUTYR SERPL-SCNC: 0.94 MMOL/L
BASOPHILS # BLD AUTO: 0 10E3/UL (ref 0–0.2)
BASOPHILS NFR BLD AUTO: 0 %
BILIRUB SERPL-MCNC: 0.5 MG/DL
BUN SERPL-MCNC: 18.9 MG/DL (ref 8–23)
CALCIUM SERPL-MCNC: 9.3 MG/DL (ref 8.8–10.4)
CHLORIDE SERPL-SCNC: 107 MMOL/L (ref 98–107)
CK SERPL-CCNC: 131 U/L (ref 26–192)
CREAT SERPL-MCNC: 0.84 MG/DL (ref 0.51–0.95)
EGFRCR SERPLBLD CKD-EPI 2021: 72 ML/MIN/1.73M2
EOSINOPHIL # BLD AUTO: 0 10E3/UL (ref 0–0.7)
EOSINOPHIL NFR BLD AUTO: 1 %
ERYTHROCYTE [DISTWIDTH] IN BLOOD BY AUTOMATED COUNT: 14.8 % (ref 10–15)
ETHANOL SERPL-MCNC: <0.01 G/DL
GLUCOSE SERPL-MCNC: 81 MG/DL (ref 70–99)
HCO3 SERPL-SCNC: 18 MMOL/L (ref 22–29)
HCT VFR BLD AUTO: 42.3 % (ref 35–47)
HGB BLD-MCNC: 13.1 G/DL (ref 11.7–15.7)
IMM GRANULOCYTES # BLD: 0 10E3/UL
IMM GRANULOCYTES NFR BLD: 0 %
LYMPHOCYTES # BLD AUTO: 2.1 10E3/UL (ref 0.8–5.3)
LYMPHOCYTES NFR BLD AUTO: 24 %
MCH RBC QN AUTO: 24.5 PG (ref 26.5–33)
MCHC RBC AUTO-ENTMCNC: 31 G/DL (ref 31.5–36.5)
MCV RBC AUTO: 79 FL (ref 78–100)
MONOCYTES # BLD AUTO: 0.5 10E3/UL (ref 0–1.3)
MONOCYTES NFR BLD AUTO: 6 %
NEUTROPHILS # BLD AUTO: 5.8 10E3/UL (ref 1.6–8.3)
NEUTROPHILS NFR BLD AUTO: 69 %
NRBC # BLD AUTO: 0 10E3/UL
NRBC BLD AUTO-RTO: 0 /100
PLATELET # BLD AUTO: 170 10E3/UL (ref 150–450)
POTASSIUM SERPL-SCNC: 3.7 MMOL/L (ref 3.4–5.3)
PROT SERPL-MCNC: 7.7 G/DL (ref 6.4–8.3)
RBC # BLD AUTO: 5.34 10E6/UL (ref 3.8–5.2)
SODIUM SERPL-SCNC: 141 MMOL/L (ref 135–145)
WBC # BLD AUTO: 8.5 10E3/UL (ref 4–11)

## 2025-05-20 PROCEDURE — 85025 COMPLETE CBC W/AUTO DIFF WBC: CPT | Performed by: PHYSICIAN ASSISTANT

## 2025-05-20 PROCEDURE — 70450 CT HEAD/BRAIN W/O DYE: CPT

## 2025-05-20 PROCEDURE — 36415 COLL VENOUS BLD VENIPUNCTURE: CPT | Performed by: PHYSICIAN ASSISTANT

## 2025-05-20 PROCEDURE — 250N000013 HC RX MED GY IP 250 OP 250 PS 637

## 2025-05-20 PROCEDURE — 82550 ASSAY OF CK (CPK): CPT | Performed by: PHYSICIAN ASSISTANT

## 2025-05-20 PROCEDURE — 99222 1ST HOSP IP/OBS MODERATE 55: CPT | Performed by: INTERNAL MEDICINE

## 2025-05-20 PROCEDURE — 250N000011 HC RX IP 250 OP 636

## 2025-05-20 PROCEDURE — 82010 KETONE BODYS QUAN: CPT | Performed by: EMERGENCY MEDICINE

## 2025-05-20 PROCEDURE — 99285 EMERGENCY DEPT VISIT HI MDM: CPT | Performed by: EMERGENCY MEDICINE

## 2025-05-20 PROCEDURE — 99207 PR APP CREDIT; MD BILLING SHARED VISIT: CPT | Mod: FS

## 2025-05-20 PROCEDURE — 99285 EMERGENCY DEPT VISIT HI MDM: CPT | Mod: FS | Performed by: EMERGENCY MEDICINE

## 2025-05-20 PROCEDURE — 80053 COMPREHEN METABOLIC PANEL: CPT | Performed by: PHYSICIAN ASSISTANT

## 2025-05-20 PROCEDURE — 120N000002 HC R&B MED SURG/OB UMMC

## 2025-05-20 PROCEDURE — 82077 ASSAY SPEC XCP UR&BREATH IA: CPT | Performed by: EMERGENCY MEDICINE

## 2025-05-20 PROCEDURE — 250N000009 HC RX 250

## 2025-05-20 RX ORDER — AMOXICILLIN 250 MG
1 CAPSULE ORAL 2 TIMES DAILY PRN
Status: DISCONTINUED | OUTPATIENT
Start: 2025-05-20 | End: 2025-05-25 | Stop reason: HOSPADM

## 2025-05-20 RX ORDER — BRIMONIDINE TARTRATE 2 MG/ML
1 SOLUTION/ DROPS OPHTHALMIC 3 TIMES DAILY
Status: DISCONTINUED | OUTPATIENT
Start: 2025-05-20 | End: 2025-05-25 | Stop reason: HOSPADM

## 2025-05-20 RX ORDER — ACETAMINOPHEN 650 MG/1
650 SUPPOSITORY RECTAL EVERY 4 HOURS PRN
Status: DISCONTINUED | OUTPATIENT
Start: 2025-05-20 | End: 2025-05-25 | Stop reason: HOSPADM

## 2025-05-20 RX ORDER — DEXTROSE, SODIUM CHLORIDE, SODIUM LACTATE, POTASSIUM CHLORIDE, AND CALCIUM CHLORIDE 5; .6; .31; .03; .02 G/100ML; G/100ML; G/100ML; G/100ML; G/100ML
500 INJECTION, SOLUTION INTRAVENOUS ONCE
Status: COMPLETED | OUTPATIENT
Start: 2025-05-20 | End: 2025-05-20

## 2025-05-20 RX ORDER — LABETALOL HYDROCHLORIDE 5 MG/ML
10 INJECTION, SOLUTION INTRAVENOUS ONCE
Status: DISCONTINUED | OUTPATIENT
Start: 2025-05-20 | End: 2025-05-20

## 2025-05-20 RX ORDER — ATORVASTATIN CALCIUM 20 MG/1
40 TABLET, FILM COATED ORAL EVERY EVENING
Status: DISCONTINUED | OUTPATIENT
Start: 2025-05-20 | End: 2025-05-25 | Stop reason: HOSPADM

## 2025-05-20 RX ORDER — CALCIUM CARBONATE 500 MG/1
1000 TABLET, CHEWABLE ORAL 4 TIMES DAILY PRN
Status: DISCONTINUED | OUTPATIENT
Start: 2025-05-20 | End: 2025-05-25 | Stop reason: HOSPADM

## 2025-05-20 RX ORDER — LATANOPROST 50 UG/ML
1 SOLUTION/ DROPS OPHTHALMIC AT BEDTIME
Status: DISCONTINUED | OUTPATIENT
Start: 2025-05-20 | End: 2025-05-25 | Stop reason: HOSPADM

## 2025-05-20 RX ORDER — DORZOLAMIDE HYDROCHLORIDE AND TIMOLOL MALEATE 20; 5 MG/ML; MG/ML
1 SOLUTION/ DROPS OPHTHALMIC 2 TIMES DAILY
Status: DISCONTINUED | OUTPATIENT
Start: 2025-05-20 | End: 2025-05-25 | Stop reason: HOSPADM

## 2025-05-20 RX ORDER — ASPIRIN 81 MG/1
81 TABLET ORAL DAILY
Status: DISCONTINUED | OUTPATIENT
Start: 2025-05-21 | End: 2025-05-25 | Stop reason: HOSPADM

## 2025-05-20 RX ORDER — AMOXICILLIN 250 MG
2 CAPSULE ORAL 2 TIMES DAILY PRN
Status: DISCONTINUED | OUTPATIENT
Start: 2025-05-20 | End: 2025-05-25 | Stop reason: HOSPADM

## 2025-05-20 RX ORDER — ACETAMINOPHEN 325 MG/1
650 TABLET ORAL EVERY 4 HOURS PRN
Status: DISCONTINUED | OUTPATIENT
Start: 2025-05-20 | End: 2025-05-25 | Stop reason: HOSPADM

## 2025-05-20 RX ORDER — LIDOCAINE 40 MG/G
CREAM TOPICAL
Status: DISCONTINUED | OUTPATIENT
Start: 2025-05-20 | End: 2025-05-25 | Stop reason: HOSPADM

## 2025-05-20 RX ADMIN — BRIMONIDINE TARTRATE 1 DROP: 2 SOLUTION/ DROPS OPHTHALMIC at 22:14

## 2025-05-20 RX ADMIN — DORZOLAMIDE HYDROCHLORIDE AND TIMOLOL MALEATE 1 DROP: 20; 5 SOLUTION OPHTHALMIC at 22:14

## 2025-05-20 RX ADMIN — DEXTROSE, SODIUM CHLORIDE, SODIUM LACTATE, POTASSIUM CHLORIDE, AND CALCIUM CHLORIDE 500 ML: 5; .6; .31; .03; .02 INJECTION, SOLUTION INTRAVENOUS at 22:07

## 2025-05-20 RX ADMIN — LATANOPROST 1 DROP: 50 SOLUTION OPHTHALMIC at 22:14

## 2025-05-20 RX ADMIN — ATORVASTATIN CALCIUM 40 MG: 20 TABLET, FILM COATED ORAL at 22:07

## 2025-05-20 ASSESSMENT — ACTIVITIES OF DAILY LIVING (ADL)
ADLS_ACUITY_SCORE: 58

## 2025-05-20 NOTE — ED TRIAGE NOTES
Patient reports she was getting back in bed this afternoon when she slipped and fell. Patient fell on her buttocks, denies hitting her head, denies any pain.      Triage Assessment (Adult)       Row Name 05/20/25 0910          Triage Assessment    Airway WDL WDL        Respiratory WDL    Respiratory WDL cough     Cough Frequency infrequent     Cough Type dry        Cardiac WDL    Cardiac WDL WDL        Peripheral/Neurovascular WDL    Peripheral Neurovascular WDL WDL        Cognitive/Neuro/Behavioral WDL    Cognitive/Neuro/Behavioral WDL WDL

## 2025-05-20 NOTE — ED NOTES
Bed: ED07  Expected date: 5/20/25  Expected time: 4:28 PM  Means of arrival: Ambulance  Comments:  N 737  72F  Fall out of bed, on floor for 6 hours  170/124, weak, tachy 110

## 2025-05-20 NOTE — ED PROVIDER NOTES
ED Provider Note  [facility]      History     Chief Complaint   Patient presents with    Fall     Had a fall at home, was on the floor for 6 hours. HTN (160's / 100's) BG = 98      HPI  75yo F pmhx recurrent falls, ventriculomegaly, subacute angle-closure glaucoma with right eye blindness, CVA and HTN biba s/p mechanical fall and prolonged downtime.  Patient states that she caught her foot on the floor, attempting to get in the bed, causing her to fall.  She denies head strike, LOC, AC use, neck pain.  She was, however, unable to get herself up off the floor for many hours until she was able to get a hold of her granddaughter called 911.  On presentation to the ED she denies pain or acute complaints.  Notably, patient had admission in April under similar circumstances.  During that admission she was noted to have ventriculomegaly, with neurology consulted and felt  shunt likely unnecessary.  She declined discharge to TCU.      Past Medical History  Past Medical History:   Diagnosis Date    Hypertension      Past Surgical History:   Procedure Laterality Date    CATARACT IOL, RT/LT      COLONOSCOPY N/A 08/30/2021    Procedure: Colonoscopy, With Polypectomy And Biopsy;  Surgeon: Federico Vital MD;  Location: [facility]    PHACOEMULSIFICATION WITH STANDARD INTRAOCULAR LENS IMPLANT Left 12/23/2024    Procedure: LEFT EYE COMPLEX PHACOEMULSIFICATION, CATARACT, WITH STANDARD INTRAOCULAR LENS IMPLANT INSERTION;  Surgeon: Ashu Grey MD;  Location: [facility] OR     atorvastatin (LIPITOR) 40 MG tablet  brimonidine (ALPHAGAN) 0.2 % ophthalmic solution  dorzolamide-timolol (COSOPT) 2-0.5 % ophthalmic solution  latanoprost (XALATAN) 0.005 % ophthalmic solution  losartan (COZAAR) 25 MG tablet  aspirin (ASA) 81 MG EC tablet      Allergies   Allergen Reactions    Penicillins      Swelling of arms and legs     Family History  Family History   Problem Relation Age of Onset    Glaucoma Mother      "Diabetes Mother         Type II    Thyroid Cancer Mother     Glaucoma Sister     Colon Cancer No family hx of     Macular Degeneration No family hx of      Social History   Social History     Tobacco Use    Smoking status: Every Day    Smokeless tobacco: Never    Tobacco comments:     starting smoking at age 26   Vaping Use    Vaping status: Never Used   Substance Use Topics    Alcohol use: Yes     Comment: 2-3 shots once every weekend    Drug use: Never      A medically appropriate review of systems was performed with pertinent positives and negatives noted in the HPI, and all other systems negative.    Physical Exam   BP: (!) 172/124  Pulse: 98  Temp: 98.1  F (36.7  C)  Resp: 16  Height: 170.2 cm (5' 7\")  SpO2: 98 %BP (!) 172/124   Pulse 98   Temp 98.1  F (36.7  C) (Oral)   Resp 16   Ht 1.702 m (5' 7\")   LMP  (LMP Unknown)   SpO2 98%   BMI 25.53 kg/m    Physical Exam  Constitutional:       General: She is not in acute distress.     Appearance: Normal appearance. She is not diaphoretic.   HENT:      Head: Atraumatic.      Mouth/Throat:      Mouth: Mucous membranes are moist.   Eyes:      Conjunctiva/sclera: Conjunctivae normal.   Cardiovascular:      Rate and Rhythm: Normal rate and regular rhythm.   Pulmonary:      Effort: No respiratory distress.   Abdominal:      General: Abdomen is flat.      Palpations: Abdomen is soft.      Tenderness: There is no abdominal tenderness.   Musculoskeletal:      Cervical back: Neck supple.      Comments: Moving all extremities freely  No trauma appreciated  No midline C/T/L spine TTP   Skin:     General: Skin is warm.   Neurological:      Mental Status: She is alert.      GCS: GCS eye subscore is 4. GCS verbal subscore is 5. GCS motor subscore is 6.      Cranial Nerves: Cranial nerves 2-12 are intact.      Sensory: Sensation is intact.      Motor: Motor function is intact.           ED Course, Procedures, & Data      Procedures                Results for orders placed or " performed during the hospital encounter of 05/20/25   CT Head w/o Contrast     Status: None    Narrative    EXAM: CT CERVICAL SPINE W/O CONTRAST, CT HEAD W/O CONTRAST  LOCATION: [facility]  DATE: 5/20/2025    INDICATION: Fall  COMPARISON:  April 23, 2025.  TECHNIQUE:   1) Routine CT Head without IV contrast. Multiplanar reformats. Dose reduction techniques were used.  2) Routine CT Cervical Spine without IV contrast. Multiplanar reformats. Dose reduction techniques were used.    FINDINGS:   HEAD CT:   INTRACRANIAL CONTENTS: No intracranial hemorrhage, extraaxial collection, or mass effect.  No CT evidence of acute infarct. Severe presumed chronic small vessel ischemic changes. Ventriculomegaly disproportionate to the degree of volume loss. Correlate   for normal pressure hydrocephalus.     VISUALIZED ORBITS/SINUSES/MASTOIDS: No intraorbital abnormality. No significant paranasal sinus mucosal disease. No middle ear or mastoid effusion.    BONES/SOFT TISSUES: No acute abnormality.    CERVICAL SPINE CT:   VERTEBRA: Straightening of the usual cervical lordosis. No acute compression fracture or posttraumatic subluxation.     CANAL/FORAMINA: Multilevel spondylosis without high grade canal stenosis.    PARASPINAL: No prevertebral edema.      Impression    IMPRESSION:  HEAD CT:  1.  No acute intracranial process.    CERVICAL SPINE CT:  1.  No CT evidence for acute fracture or post traumatic subluxation.   CT Cervical Spine w/o Contrast     Status: None    Narrative    EXAM: CT CERVICAL SPINE W/O CONTRAST, CT HEAD W/O CONTRAST  LOCATION: [facility]  DATE: 5/20/2025    INDICATION: Fall  COMPARISON:  April 23, 2025.  TECHNIQUE:   1) Routine CT Head without IV contrast. Multiplanar reformats. Dose reduction techniques were used.  2) Routine CT Cervical Spine without IV contrast. Multiplanar reformats. Dose reduction techniques were  used.    FINDINGS:   HEAD CT:   INTRACRANIAL CONTENTS: No intracranial hemorrhage, extraaxial collection, or mass effect.  No CT evidence of acute infarct. Severe presumed chronic small vessel ischemic changes. Ventriculomegaly disproportionate to the degree of volume loss. Correlate   for normal pressure hydrocephalus.     VISUALIZED ORBITS/SINUSES/MASTOIDS: No intraorbital abnormality. No significant paranasal sinus mucosal disease. No middle ear or mastoid effusion.    BONES/SOFT TISSUES: No acute abnormality.    CERVICAL SPINE CT:   VERTEBRA: Straightening of the usual cervical lordosis. No acute compression fracture or posttraumatic subluxation.     CANAL/FORAMINA: Multilevel spondylosis without high grade canal stenosis.    PARASPINAL: No prevertebral edema.      Impression    IMPRESSION:  HEAD CT:  1.  No acute intracranial process.    CERVICAL SPINE CT:  1.  No CT evidence for acute fracture or post traumatic subluxation.   Comprehensive metabolic panel     Status: Abnormal   Result Value Ref Range    Sodium 141 135 - 145 mmol/L    Potassium 3.7 3.4 - 5.3 mmol/L    Carbon Dioxide (CO2) 18 (L) 22 - 29 mmol/L    Anion Gap 16 (H) 7 - 15 mmol/L    Urea Nitrogen 18.9 8.0 - 23.0 mg/dL    Creatinine 0.84 0.51 - 0.95 mg/dL    GFR Estimate 72 >60 mL/min/1.73m2    Calcium 9.3 8.8 - 10.4 mg/dL    Chloride 107 98 - 107 mmol/L    Glucose 81 70 - 99 mg/dL    Alkaline Phosphatase 94 40 - 150 U/L    AST 21 0 - 45 U/L    ALT 16 0 - 50 U/L    Protein Total 7.7 6.4 - 8.3 g/dL    Albumin 4.5 3.5 - 5.2 g/dL    Bilirubin Total 0.5 <=1.2 mg/dL   CK total     Status: Normal   Result Value Ref Range     26 - 192 U/L   CBC with platelets and differential     Status: Abnormal   Result Value Ref Range    WBC Count 8.5 4.0 - 11.0 10e3/uL    RBC Count 5.34 (H) 3.80 - 5.20 10e6/uL    Hemoglobin 13.1 11.7 - 15.7 g/dL    Hematocrit 42.3 35.0 - 47.0 %    MCV 79 78 - 100 fL    MCH 24.5 (L) 26.5 - 33.0 pg    MCHC 31.0 (L) 31.5 - 36.5  g/dL    RDW 14.8 10.0 - 15.0 %    Platelet Count 170 150 - 450 10e3/uL    % Neutrophils 69 %    % Lymphocytes 24 %    % Monocytes 6 %    % Eosinophils 1 %    % Basophils 0 %    % Immature Granulocytes 0 %    NRBCs per 100 WBC 0 <1 /100    Absolute Neutrophils 5.8 1.6 - 8.3 10e3/uL    Absolute Lymphocytes 2.1 0.8 - 5.3 10e3/uL    Absolute Monocytes 0.5 0.0 - 1.3 10e3/uL    Absolute Eosinophils 0.0 0.0 - 0.7 10e3/uL    Absolute Basophils 0.0 0.0 - 0.2 10e3/uL    Absolute Immature Granulocytes 0.0 <=0.4 10e3/uL    Absolute NRBCs 0.0 10e3/uL   Ethanol Level Blood     Status: Normal   Result Value Ref Range    Ethanol Level Blood <0.01 <=0.01 g/dL   Ketone Beta-Hydroxybutyrate Quantitative,Rapid     Status: Abnormal   Result Value Ref Range    Ketone (Beta-Hydroxybutyrate) Quantitative 0.94 (H) <=0.30 mmol/L   CBC with platelets differential     Status: Abnormal    Narrative    The following orders were created for panel order CBC with platelets differential.  Procedure                               Abnormality         Status                     ---------                               -----------         ------                     CBC with platelets and ...[[id number]]  Abnormal            Final result                 Please view results for these tests on the individual orders.     Medications - No data to display  Labs Ordered and Resulted from Time of ED Arrival to Time of ED Departure   COMPREHENSIVE METABOLIC PANEL - Abnormal       Result Value    Sodium 141      Potassium 3.7      Carbon Dioxide (CO2) 18 (*)     Anion Gap 16 (*)     Urea Nitrogen 18.9      Creatinine 0.84      GFR Estimate 72      Calcium 9.3      Chloride 107      Glucose 81      Alkaline Phosphatase 94      AST 21      ALT 16      Protein Total 7.7      Albumin 4.5      Bilirubin Total 0.5     CBC WITH PLATELETS AND DIFFERENTIAL - Abnormal    WBC Count 8.5      RBC Count 5.34 (*)     Hemoglobin 13.1      Hematocrit 42.3      MCV 79      MCH 24.5  (*)     MCHC 31.0 (*)     RDW 14.8      Platelet Count 170      % Neutrophils 69      % Lymphocytes 24      % Monocytes 6      % Eosinophils 1      % Basophils 0      % Immature Granulocytes 0      NRBCs per 100 WBC 0      Absolute Neutrophils 5.8      Absolute Lymphocytes 2.1      Absolute Monocytes 0.5      Absolute Eosinophils 0.0      Absolute Basophils 0.0      Absolute Immature Granulocytes 0.0      Absolute NRBCs 0.0     KETONE BETA-HYDROXYBUTYRATE QUANTITATIVE, RAPID - Abnormal    Ketone (Beta-Hydroxybutyrate) Quantitative 0.94 (*)    CK TOTAL - Normal         ETHANOL LEVEL BLOOD - Normal    Ethanol Level Blood <0.01     ROUTINE UA WITH MICROSCOPIC REFLEX TO CULTURE     CT Cervical Spine w/o Contrast   Final Result   IMPRESSION:   HEAD CT:   1.  No acute intracranial process.      CERVICAL SPINE CT:   1.  No CT evidence for acute fracture or post traumatic subluxation.      CT Head w/o Contrast   Final Result   IMPRESSION:   HEAD CT:   1.  No acute intracranial process.      CERVICAL SPINE CT:   1.  No CT evidence for acute fracture or post traumatic subluxation.             Critical care was not performed.     Medical Decision Making  The patient's presentation was of high complexity (an acute health issue posing potential threat to life or bodily function).    The patient's evaluation involved:  review of external note(s) from 3+ sources (see separate area of note for details)  ordering and/or review of 3+ test(s) in this encounter (see separate area of note for details)  discussion of management or test interpretation with another health professional (see separate area of note for details)    The patient's management necessitated high risk (a decision regarding hospitalization).    Assessment & Plan    75yo F pmhx recurrent falls, ventriculomegaly, subacute angle-closure glaucoma with right eye blindness, CVA and HTN biba s/p mechanical fall w/ prolonged time on floor (?~8hrs).  Patient denies  injury, or acute complaints at present.  No head strike, LOC, AC use, neck pain.  Notably, patient had admission in April under similar circumstances, declining discharge to TCU.  At present, patient is hypertensive (172/124), did not take her losartan this morning.  Vitals otherwise unremarkable.  Patient is A&O, GCS 15.  Moving all extremities freely.  No trauma appreciated.  Patient's workup tonight reassuring. NCHCT and c-spine CT negative.  CK negative reassuring against rhabdo..  CBC unremarkable.  CMP only notable for mild anion gap, with mild ketones, likely 2/2 starvation ketosis as patient was down for multiple hours.  UA pending.  Given patient's multiple falls, and short period of time, will admit patient for PT evaluation and consideration of placement in assisted living.          I have reviewed the nursing notes. I have reviewed the findings, diagnosis, plan and need for follow up with the patient.    New Prescriptions    No medications on file       Final diagnoses:   Failure to thrive in adult   Recurrent falls         Marek Beavers PA-C  [facility] EMERGENCY DEPARTMENT  5/20/2025     Marek Beavers PA-C  05/20/25 2039

## 2025-05-21 ENCOUNTER — APPOINTMENT (OUTPATIENT)
Dept: PHYSICAL THERAPY | Facility: CLINIC | Age: 75
End: 2025-05-21
Payer: COMMERCIAL

## 2025-05-21 ENCOUNTER — APPOINTMENT (OUTPATIENT)
Dept: OCCUPATIONAL THERAPY | Facility: CLINIC | Age: 75
End: 2025-05-21
Payer: COMMERCIAL

## 2025-05-21 LAB
ANION GAP SERPL CALCULATED.3IONS-SCNC: 13 MMOL/L (ref 7–15)
B-OH-BUTYR SERPL-SCNC: 0.23 MMOL/L
BUN SERPL-MCNC: 13.8 MG/DL (ref 8–23)
CALCIUM SERPL-MCNC: 8.8 MG/DL (ref 8.8–10.4)
CHLORIDE SERPL-SCNC: 108 MMOL/L (ref 98–107)
CREAT SERPL-MCNC: 0.8 MG/DL (ref 0.51–0.95)
EGFRCR SERPLBLD CKD-EPI 2021: 76 ML/MIN/1.73M2
ERYTHROCYTE [DISTWIDTH] IN BLOOD BY AUTOMATED COUNT: 14.8 % (ref 10–15)
GLUCOSE SERPL-MCNC: 101 MG/DL (ref 70–99)
HCO3 SERPL-SCNC: 17 MMOL/L (ref 22–29)
HCT VFR BLD AUTO: 39.7 % (ref 35–47)
HGB BLD-MCNC: 12.7 G/DL (ref 11.7–15.7)
MCH RBC QN AUTO: 25 PG (ref 26.5–33)
MCHC RBC AUTO-ENTMCNC: 32 G/DL (ref 31.5–36.5)
MCV RBC AUTO: 78 FL (ref 78–100)
PLATELET # BLD AUTO: 96 10E3/UL (ref 150–450)
POTASSIUM SERPL-SCNC: 3.8 MMOL/L (ref 3.4–5.3)
RBC # BLD AUTO: 5.08 10E6/UL (ref 3.8–5.2)
SODIUM SERPL-SCNC: 138 MMOL/L (ref 135–145)
WBC # BLD AUTO: 6.4 10E3/UL (ref 4–11)

## 2025-05-21 PROCEDURE — 97535 SELF CARE MNGMENT TRAINING: CPT | Mod: GO

## 2025-05-21 PROCEDURE — 250N000013 HC RX MED GY IP 250 OP 250 PS 637

## 2025-05-21 PROCEDURE — 80048 BASIC METABOLIC PNL TOTAL CA: CPT

## 2025-05-21 PROCEDURE — 36415 COLL VENOUS BLD VENIPUNCTURE: CPT

## 2025-05-21 PROCEDURE — 82010 KETONE BODYS QUAN: CPT

## 2025-05-21 PROCEDURE — 99232 SBSQ HOSP IP/OBS MODERATE 35: CPT | Performed by: STUDENT IN AN ORGANIZED HEALTH CARE EDUCATION/TRAINING PROGRAM

## 2025-05-21 PROCEDURE — 97161 PT EVAL LOW COMPLEX 20 MIN: CPT | Mod: GP

## 2025-05-21 PROCEDURE — 99222 1ST HOSP IP/OBS MODERATE 55: CPT

## 2025-05-21 PROCEDURE — G0378 HOSPITAL OBSERVATION PER HR: HCPCS

## 2025-05-21 PROCEDURE — 85027 COMPLETE CBC AUTOMATED: CPT

## 2025-05-21 PROCEDURE — 97166 OT EVAL MOD COMPLEX 45 MIN: CPT | Mod: GO

## 2025-05-21 PROCEDURE — 250N000013 HC RX MED GY IP 250 OP 250 PS 637: Performed by: INTERNAL MEDICINE

## 2025-05-21 PROCEDURE — 97530 THERAPEUTIC ACTIVITIES: CPT | Mod: GP

## 2025-05-21 RX ADMIN — DORZOLAMIDE HYDROCHLORIDE AND TIMOLOL MALEATE 1 DROP: 20; 5 SOLUTION OPHTHALMIC at 08:19

## 2025-05-21 RX ADMIN — DORZOLAMIDE HYDROCHLORIDE AND TIMOLOL MALEATE 1 DROP: 20; 5 SOLUTION OPHTHALMIC at 20:49

## 2025-05-21 RX ADMIN — BRIMONIDINE TARTRATE 1 DROP: 2 SOLUTION/ DROPS OPHTHALMIC at 08:19

## 2025-05-21 RX ADMIN — LATANOPROST 1 DROP: 50 SOLUTION OPHTHALMIC at 22:14

## 2025-05-21 RX ADMIN — ASPIRIN 81 MG: 81 TABLET ORAL at 08:21

## 2025-05-21 RX ADMIN — Medication 12.5 MG: at 08:21

## 2025-05-21 RX ADMIN — ATORVASTATIN CALCIUM 40 MG: 20 TABLET, FILM COATED ORAL at 20:48

## 2025-05-21 RX ADMIN — BRIMONIDINE TARTRATE 1 DROP: 2 SOLUTION/ DROPS OPHTHALMIC at 20:49

## 2025-05-21 RX ADMIN — BRIMONIDINE TARTRATE 1 DROP: 2 SOLUTION/ DROPS OPHTHALMIC at 14:58

## 2025-05-21 ASSESSMENT — ACTIVITIES OF DAILY LIVING (ADL)
ADLS_ACUITY_SCORE: 45
ADLS_ACUITY_SCORE: 45
ADLS_ACUITY_SCORE: 38
ADLS_ACUITY_SCORE: 45
ADLS_ACUITY_SCORE: 41
DEPENDENT_IADLS:: CLEANING;LAUNDRY;MEAL PREPARATION
ADLS_ACUITY_SCORE: 41
ADLS_ACUITY_SCORE: 45
ADLS_ACUITY_SCORE: 41
ADLS_ACUITY_SCORE: 45
ADLS_ACUITY_SCORE: 41
ADLS_ACUITY_SCORE: 38
ADLS_ACUITY_SCORE: 45
ADLS_ACUITY_SCORE: 41
ADLS_ACUITY_SCORE: 45
PREVIOUS_RESPONSIBILITIES: MEAL PREP;HOUSEKEEPING;LAUNDRY;MEDICATION MANAGEMENT;FINANCES
ADLS_ACUITY_SCORE: 41
ADLS_ACUITY_SCORE: 45
ADLS_ACUITY_SCORE: 41
ADLS_ACUITY_SCORE: 41
ADLS_ACUITY_SCORE: 38

## 2025-05-21 NOTE — PROGRESS NOTES
[facility]                                                                                   OUTPATIENT OCCUPATIONAL THERAPY    PLAN OF TREATMENT FOR OUTPATIENT REHABILITATION   Patient's Last Name, First Name, Rolanda Willis YOB: 1950   Provider's Name   [facility]   Medical Record No.  [id number]     Onset Date: 05/20/25 Start of Care Date: 05/21/25     Medical Diagnosis:  Failure to thrive in adult, Recurrent falls               OT Diagnosis:  decreased I with ADL Certification Dates:  From: 05/21/25  To: 06/20/25     See note for plan of treatment, functional goals, and certification details.    I CERTIFY THE NEED FOR THESE SERVICES FURNISHED UNDER        THIS PLAN OF TREATMENT AND WHILE UNDER MY CARE (Physician co-signature of this document indicates review and certification of the therapy plan).                       05/21/25 1030   Appointment Info   Signing Clinician's Name / Credentials (OT) Herlinda Guillory MS, OTR/L, CLT   Rehab Comments (OT) R eye blind   Quick Adds   Quick Adds [facility] Auth & Certification   Living Environment   People in Home alone   Current Living Arrangements apartment;independent living facility   Home Accessibility no concerns   Transportation Anticipated family or friend will provide   Living Environment Comments Pt lives alone in IND living facility, reports having WIS with grab bars and a chair. Pt reports a low toilet at home.   Self-Care   Usual Activity Tolerance fair   Current Activity Tolerance fair   Regular Exercise No   Equipment Currently Used at Home walker, rolling;shower chair   Fall history within last six months yes   Number of times patient has fallen within last six months 3   Activity/Exercise/Self-Care Comment Pt reports using 4WW at home, though somewhat unclear if she uses in her apartment all the time. Pt reports ind with ADL at baseline though reports difficulty with all. Pt  "reports she mostly sits in her chair because it is hard for her to move around.   Instrumental Activities of Daily Living (IADL)   Previous Responsibilities meal prep;housekeeping;laundry;medication management;finances   IADL Comments Pt gets A from her dtr for driving and errands, otherwise ind with IADL per report. Pt notes she would like increased A with IADL.   General Information   Onset of Illness/Injury or Date of Surgery 05/20/25   Referring Physician Kentrell Diana PA-C   Patient/Family Therapy Goal Statement (OT) go home   Additional Occupational Profile Info/Pertinent History of Current Problem Per chart: \"Rolanda Molina is a 74 year old female admitted on 5/20/2025.  History notable for ventriculomegaly, glaucoma with blindness of right eye, CVA, hypertension, recurrent falls.  Admitted to medicine after recurrent falls at home.\"   Existing Precautions/Restrictions fall   General Observations and Info Activity: up with A   Cognitive Status Examination   Orientation Status orientation to person, place and time   Cognitive Status Comments Pt reporting concerns with her memory. She seems to lack some insight into deficits and safety awareness, for instance she reports needing assist to stand but also stating she will be fine at home despite not having assist. Pt also giving conflicting information at times. Pt expressing how hard it is to get old, and when asked how old she was, she replies, \"In my forties.\"   Visual Perception   Impact of Vision Impairment on Function (Vision) Pt blind in R eye   Pain Assessment   Patient Currently in Pain No   Range of Motion Comprehensive   Comment, General Range of Motion BUE WFL   Strength Comprehensive (MMT)   Comment, General Manual Muscle Testing (MMT) Assessment generalized deconditioning   Bed Mobility   Comment (Bed Mobility) SBA   Transfers   Transfer Comments max A STS   Balance   Balance Comments CGA FWW   Activities of Daily Living   BADL " Assessment/Intervention bathing;lower body dressing;toileting   Bathing Assessment/Intervention   Ida Level (Bathing) moderate assist (50% patient effort)   Lower Body Dressing Assessment/Training   Ida Level (Lower Body Dressing) moderate assist (50% patient effort)   Grooming Assessment/Training   Ida Level (Grooming) contact guard assist   Toileting   Ida Level (Toileting) moderate assist (50% patient effort)   Clinical Impression   Criteria for Skilled Therapeutic Interventions Met (OT) Yes, treatment indicated   OT Diagnosis decreased I with ADL   OT Problem List-Impairments impacting ADL activity tolerance impaired;balance;cognition;mobility;strength   Assessment of Occupational Performance 3-5 Performance Deficits   Identified Performance Deficits dressing, toileting, transfers, IADL   Planned Therapy Interventions (OT) strengthening;transfer training   Clinical Decision Making Complexity (OT) detailed assessment/moderate complexity   Risk & Benefits of therapy have been explained evaluation/treatment results reviewed;care plan/treatment goals reviewed;risks/benefits reviewed;current/potential barriers reviewed;participants voiced agreement with care plan;participants included;patient   Clinical Impression Comments Pt presents with impaired ADL/IADL 2/2 the above, will benefit from skilled OT to address and maximize safety and I with ADL   OT Total Evaluation Time   OT Eval, Moderate Complexity Minutes (27048) 9   Therapy Certification   Start of Care Date 05/21/25   Certification date from 05/21/25   Certification date to 06/20/25   Medical Diagnosis Failure to thrive in adult, Recurrent falls   [facility] Authorization Information   Condition type Recurrent (multiple episodes of < 3 months)   Cause of current episode Unspecified   Nature of treatment Rehabilitative   Functional ability Moderate functional limitations   Documented POC (choose all that apply) Measurable short and  "long term/discharge treatment goals related to physical and functional deficits.;Frequency of treatment visits and treatment activities to address deficit areas.;Patient agrees to program participation including home program   Briefly describe symptoms weakness, deconditioning, impaired balance affecting mobility and ADL   How did the symptoms start gradual onset   Last 24H: avg pain/symptom intensity  no pain   Past wk: avg pain/symptom intensity no pain   Frequency of Symptoms Frequently (51-75% of the time)   Symptom impact on ADLs Quite a bit   Condition change since eval No change   General health reported by patient Fair   OT Goals   Therapy Frequency (OT) 4 times/week   OT Predicted Duration/Target Date for Goal Attainment 06/20/25   OT Goals Lower Body Dressing;Toilet Transfer/Toileting;Home Management;Lower Body Bathing   OT: Lower Body Dressing Modified independent;including set-up/clothing retrieval   OT: Lower Body Bathing Modified independent   OT: Toilet Transfer/Toileting Modified independent   OT: Home Management Modified independent;with light demand household tasks   Self-Care/Home Management   Self-Care/Home Mgmt/ADL, Compensatory, Meal Prep Minutes (34850) 39   Symptoms Noted During/After Treatment (Meal Preparation/Planning Training) fatigue   Treatment Detail/Skilled Intervention Treatment initiated following evaluation. Focus on ADL routine and functional mobility to support safety and I at home. Pt ambulating in room with FWW and CGA. Pt moves very slowly with small steps, does not appear to be shuffling feet although she reports she does shuffle her feet. STS from EOB x3, max A as pt has difficulty WS forward and needs full support with buttocks cleared as she attempts to get her weight forward. Does not seem to improve with cues or rocking. Pt reports \"I'm falling\" during these times, but later stating she has no concerns with her mobility. Pt reporting her own toilet is very low. Educated " "in RTS for home, pt interested in obtaining. Pt reporting concerns with her memory. Educated in memory aids, pt somewhat resistive to this expressing concerns with finances and also \"I don't see why that would help.\" Pt up to sink with CGA FWW, some cues to locate items and navigate in BR. Pt able to brush teeth and wash face standing with CGA. Pt returned to bed with SBA FWW.   OT Discharge Planning   OT Plan LE dressing, STS including from low toilet, monitor cognition and consider med task   OT Discharge Recommendation (DC Rec) Transitional Care Facility   OT Rationale for DC Rec Pt appears well below baseline, requiring max Ax1 for STS today. Pt does not have assist at home and needs to be ind with ADL and transfers. In addition, pt with questionable cognition on observation today, question safe ability to manage meds and money at home; will continue to monitor. Pt would benefit from TCU to maximize safety and I with functional mobility and ADL. However, pt also seeming to need more support at home, may need to consider more supportive living for long term.   OT Brief overview of current status max A STS, CGA FWW ambulation   OT Total Distance Amb During Session (feet) 20   OT Equipment Needed at Discharge raised toilet seat   Total Session Time   Timed Code Treatment Minutes 39   Total Session Time (sum of timed and untimed services) 48     "

## 2025-05-21 NOTE — PLAN OF CARE
"Goal Outcome Evaluation:     Plan of Care Reviewed With: patient    Overall Patient Progress: no change    Shift [id number]:  VS: BP (!) 152/101   Pulse 82   Temp 98.1  F (36.7  C) (Oral)   Resp 17   Ht 1.702 m (5' 7\")   LMP  (LMP Unknown)   SpO2 99%   BMI 25.53 kg/m     O2: Stable On RA. No complaints of chest pain and SOB.    Output: Continent of bowel and bladder, voids without difficulties.     Last BM: 5/20/2024.    Activity: Assist of 1 with walker/gait belt.   Skin: Dry intact   Pain: Denies pain   Neuro: Pt A&Ox4, able to make needs known.   Dressing: None.   Diet: Regular diet. No complaints of  nausea/vomiting   LDA: Left PIV Saline locked.   Equipment: Personal belongings/walker   Plan: Continue POC.   Additional Info:      "

## 2025-05-21 NOTE — PROGRESS NOTES
"   05/21/25 1400   Appointment Info   Signing Clinician's Name / Credentials (PT) Brea Ma, PT, DPT   Quick Adds   Quick Adds [facility] Auth & Certification   Living Environment   People in Home facility resident   Current Living Arrangements apartment;independent living facility   Home Accessibility no concerns  (elevator access)   Transportation Anticipated family or friend will provide   Living Environment Comments Pt lives alone in IND living facility. Reports walk in shower with grab bars. No grab bars by the toilet. Pt reports that she spends most of her time in her chair.   Self-Care   Usual Activity Tolerance fair   Current Activity Tolerance poor   Regular Exercise No   Equipment Currently Used at Home walker, rolling;shower chair   Fall history within last six months yes   Number of times patient has fallen within last six months 3   Activity/Exercise/Self-Care Comment Pt has 4WW that she uses at baseline but reported to PT that she does not always use it when in her apartment. Reports she spends most of her day in her chair or in one spot.   General Information   Onset of Illness/Injury or Date of Surgery 05/20/25   Referring Physician Kentrell Diana PA-C   Patient/Family Therapy Goals Statement (PT) Return home   Pertinent History of Current Problem (include personal factors and/or comorbidities that impact the POC) Per EMR: \"Rolanda Molina is a 74 year old female admitted on 5/20/2025.  History notable for ventriculomegaly, glaucoma with blindness of right eye, CVA, hypertension, recurrent falls.  Admitted to medicine after recurrent falls at home. OT recommendation for TCU, patient declining. Will continue to work on safe dispo planning. \"   Existing Precautions/Restrictions fall   Weight-Bearing Status - LUE full weight-bearing   Weight-Bearing Status - RUE full weight-bearing   Weight-Bearing Status - LLE full weight-bearing   Weight-Bearing Status - RLE full weight-bearing   General " Observations Pt supine in bed upon PT entry, agreeable to PT evaluation and session.   Cognition   Affect/Mental Status (Cognition) WFL   Follows Commands (Cognition) WFL   Cognitive Status Comments Pt a poor historian for home set up and PLOF.   Pain Assessment   Patient Currently in Pain No   Integumentary/Edema   Integumentary/Edema no deficits were identifed   Posture    Posture Forward head position;Protracted shoulders   Range of Motion (ROM)   Range of Motion ROM is WFL   Strength (Manual Muscle Testing)   Strength (Manual Muscle Testing) Deficits observed during functional mobility   Strength Comments Grossly deconditioned, BLE strength >3/5 per functional screen, weakness L>R   Bed Mobility   Comment, (Bed Mobility) CGA bed mobility   Transfers   Comment, (Transfers) heavy maxA STS from EOB   Gait/Stairs (Locomotion)   Comment, (Gait/Stairs) Pt amb in room with CGA and FWW, decreased step lengh and gait speed, shuffling steps   Balance   Balance Comments Requires CGA and FWW in standing for balance, pt often reaching towards walls/furniture for additional UE support.   Sensory Examination   Sensory Perception patient reports no sensory changes   Coordination   Coordination no deficits were identified   Muscle Tone   Muscle Tone no deficits were identified   Clinical Impression   Criteria for Skilled Therapeutic Intervention Yes, treatment indicated   PT Diagnosis (PT) Impaired functional mobility   Influenced by the following impairments weakness, decreased activity tolerance, impaired balance   Functional limitations due to impairments bed mobility, transfers, ambulation, stairs   Clinical Presentation (PT Evaluation Complexity) stable   Clinical Presentation Rationale per clinical judgment   Clinical Decision Making (Complexity) low complexity   Planned Therapy Interventions (PT) balance training;bed mobility training;gait training;home exercise program;patient/family education;strengthening;transfer  training;progressive activity/exercise;risk factor education;home program guidelines   Risk & Benefits of therapy have been explained evaluation/treatment results reviewed;care plan/treatment goals reviewed;risks/benefits reviewed;current/potential barriers reviewed;participants voiced agreement with care plan;participants included;patient   Clinical Impression Comments Rolanda is a 74 year old female who present to IP PT with the above impairments. She would benefit from skilled IP PT to safely progress IND with functional mobility an strength.   PT Total Evaluation Time   PT Eval, Low Complexity Minutes (83738) 5   Therapy Certification   Start of care date 05/21/25   Certification date from 05/21/25   Certification date to 06/18/25   Medical Diagnosis Fall   [facility] Authorization Information   Condition type Recurrent (multiple episodes of < 3 months)   Cause of current episode Unspecified  (fall)   Nature of treatment Rehabilitative   Functional ability Moderate functional limitations   Documented POC (choose all that apply) Measurable short and long term/discharge treatment goals related to physical and functional deficits.;Frequency of treatment visits and treatment activities to address deficit areas.;Patient agrees to program participation including home program   Briefly describe symptoms weakness   How did the symptoms start fall   Last 24H: avg pain/symptom intensity  no pain   Past wk: avg pain/symptom intensity no pain   Frequency of Symptoms Intermittently (0-25% of the time)   Symptom impact on ADLs A little bit   Condition change since eval N/A (first visit)   General health reported by patient Good   Physical Therapy Goals   PT Frequency Daily   PT Predicted Duration/Target Date for Goal Attainment 06/18/25   PT Goals Bed Mobility;Transfers;Gait   PT: Bed Mobility Independent;Supine to/from sit   PT: Transfers Modified independent;Sit to/from stand;Bed to/from chair;Assistive device   PT: Gait  Modified independent;Assistive device;Rolling walker;Greater than 200 feet   PT Discharge Planning   PT Plan STS transfers, progress gait, stairs if d/c to sisters?   PT Discharge Recommendation (DC Rec) Transitional Care Facility;home with assist;home with home care physical therapy   PT Rationale for DC Rec Patient functioning below baseline requiring maxA for STS transfers and Ax1 for all mobility. Pt lives alone and is IND at baseline. Recommend transitional care for discharge in order to progress functional mobility independence and strength. However, pt currently refusing TCU. Pt would benefit from home care PT if d/c home.   PT Brief overview of current status CGA bed mobility, MaxA STS, CGA amb   PT Total Distance Amb During Session (feet) 40   Physical Therapy Time and Intention   Timed Code Treatment Minutes 40   Total Session Time (sum of timed and untimed services) 45     [facility]                                                                                   OUTPATIENT PHYSICAL THERAPY    PLAN OF TREATMENT FOR OUTPATIENT REHABILITATION   Patient's Last Name, First Name, Rolanda Willis YOB: 1950   Provider's Name   [facility]   Medical Record No.  [id number]     Onset Date: 05/20/25 Start of Care Date: 05/21/25     Medical Diagnosis:  Fall               PT Diagnosis:  Impaired functional mobility Certification Dates:  From: 05/21/25  To: 06/18/25       See note for plan of treatment, functional goals, and certification details.    I CERTIFY THE NEED FOR THESE SERVICES FURNISHED UNDER        THIS PLAN OF TREATMENT AND WHILE UNDER MY CARE (Physician co-signature of this document indicates review and certification of the therapy plan).

## 2025-05-21 NOTE — PROGRESS NOTES
" Rolanda Molina Admission Note    Reason for admission: Failure to thrive in adult  Primary team notified of pt arrival. Yes  Admitted from: [facility].  Via: Stretcher  Accompanied by: Staff  Belongings: Placed in closet; valuables sent home with family  Admission Required Doc Completed: Yes  Mobility Devices Utilized by Patient Provided (i.e. walker, wheelchair, etc.): Walker  Teaching: Orientation to unit and call light- call light within reach, use of console, meal times, when to call for the RN, and enforced importance of safety.  IV Access: Left PIV  Telemetry: No  Ht./Wt.: Completed  Code Status verified on armband: Yes  2 RN Skin Assessment Completed with: Maureen KAYE Rn.   Suction/Ambu bag/Flowmeter at bedside: No    Pt status: Patient A&Ox4, able to make needs known. Denies pain,CP/SOB. On room air. Uses call light appropriately.     BP (!) 152/101   Pulse 82   Temp 98.1  F (36.7  C) (Oral)   Resp 17   Ht 1.702 m (5' 7\")   LMP  (LMP Unknown)   SpO2 99%   BMI 25.53 kg/m  '  "

## 2025-05-21 NOTE — PHARMACY-ADMISSION MEDICATION HISTORY
Pharmacist Admission Medication History    Admission medication history is complete. The information provided in this note is only as accurate as the sources available at the time of the update.    Information Source(s): Patient, Hospital records, and CareEverywhere/SureScripts via in-person    Pertinent Information: Patient knew her meds well.   - losartan: patient reported that she did not know this was prescribed as half-tab daily and that she did not know that she could half tab meds. Losartan was Rx'd at 12.5 mg daily but patient has been taking 25 mg daily. (Does not appear this was Rx'd or intentional dose increase.)   - aspirin: patient has not been taking pta. When asked why, she reported she thought a provider told her to stop it. Appears it is Rx'd d/t h/o strokes and likely should be continued. Paged provider to clarify.     Changes made to PTA medication list:  Added: None  Deleted: None  Changed: losartan 12.5 mg to 25 mg daily (doesn't appear Rx'd this way, pt misunderstanding, see above)     Allergies reviewed with patient and updates made in EHR: yes    Medication History Completed By: Bill Vasquez RPH 5/21/2025 11:27 AM    PTA Med List   Medication Sig Last Dose/Taking    atorvastatin (LIPITOR) 40 MG tablet Take 1 tablet (40 mg) by mouth every evening. 5/19/2025 Evening    brimonidine (ALPHAGAN) 0.2 % ophthalmic solution Place 1 drop into the right eye 3 times daily. 5/20/2025    dorzolamide-timolol (COSOPT) 2-0.5 % ophthalmic solution Place 1 drop into both eyes 2 times daily. 5/20/2025    latanoprost (XALATAN) 0.005 % ophthalmic solution Place 1 drop into the right eye at bedtime. 5/19/2025 Evening    losartan (COZAAR) 25 MG tablet Take 0.5 tablets (12.5 mg) by mouth daily. (Patient taking differently: Take 25 mg by mouth daily.) 5/19/2025

## 2025-05-21 NOTE — PLAN OF CARE
"Goal Outcome Evaluation:      Plan of Care Reviewed With: patient    Overall Patient Progress: no changeOverall Patient Progress: no change    VS: BP (!) 140/109 (BP Location: Left arm, Patient Position: Sitting, Cuff Size: Adult Regular)   Pulse 97   Temp 98.6  F (37  C) (Oral)   Resp 17   Ht 1.702 m (5' 7\")   LMP  (LMP Unknown)   SpO2 99%   BMI 25.53 kg/m       O2: 99% on RA no complaints of SOB or chest pain.   Output: Voiding adequate amounts w/o pain or difficulty to bathroom.   Last BM: 5/20   Activity: Ax1 with walker and gb.    Skin: Intact.   Pain: Denies.   CMS: A&Ox4. Able to make needs known. CMS intact.   Dressing: None.   Diet: Regular, tolerating well. No complaints of nausea or vomiting.   LDA: LPIV SL.   Equipment: Personal belongings   Plan: Continue with POC.   Additional Info:              "

## 2025-05-21 NOTE — PLAN OF CARE
"Goal Outcome Evaluation:      Plan of Care Reviewed With: patient, child    Outcome Evaluation: Care mgmt following for discharge planning.  Care team presently recommends TCU, however pt not agreeable.  See care mgmt note(s) for details.        Robbie Khan, RNCC  [facility]  Units 8M/S & 10 ICU  Reachable on [employer] - Search by name or search \"RNCC\"  Phone: [phone]    "

## 2025-05-21 NOTE — DISCHARGE INSTRUCTIONS
[facility]  [phone]    [facility] is a free resource for older adults and caregivers. Their Community Support Specialists can provided resources for aging in place, transitioning to a higher-level of care, selecting an appropriate Medicare plan - ALL things that apply to adults maneuvering the aging process to ensure that all their needs are met.    [facility] can assist with:     Understanding Medicare with fair and unbiased help, so you can choose the one that best fits your needs.    You can search for housing options by visiting www.[employer] and typing in housing in the search bar or by calling the [facility] and speaking to an agent that can assist you at [phone], Monday through Friday from 8:00 a.m. to 4:30 p.m.    Planning for your future:        - Getting money and property organized by connecting with a .         - Selecting a Health Care Agent to make health care decisions for you, if you become unable to make or communicate decisions for yourself.  - Plan for long-term care services and supports, which are on-going help for someone with a chronic health condition or disability, and can be delivered in a person s home, the community or facility. Long-term care services and supports are expensive and many people are surprised to learn that Medicare covers very little. It is important that you have a plan for how you will pay for your long-term care. You can visit the [facility] s website to learn more about your options [place]/[facility]/general-public/own-your-future.    *For more information, download a copy of the [facility] s Planning Ahead booklet: https://[place].gov/[facility]-line/assets/MBA_3012_PlanningAhead_tcm1150-176380.pdf        [place]  disabilityhub[place].org/    [place] can provide information regarding several topics and connect you with further resources. Some of these topics include (but are not  limited to):    Health:  Prescriptions  Medical Assistance  Medicare  Housing  Affordable housing  Housing stabilization  Maintaining [place]  Assistive Technology  Community First Services and Supports  PCA services  Transportation  Waivers (Home and community-based waivers)  Money  SNAP  Social Security  Other  Guardianship and Alternatives  State Medical Review Team (SMRT)    Reach out to Disability Hub team members via chat on their website, or by calling them at [phone].        Applying for [facility] Benefits  Food assistance (SNAP), cash programs, emergency assistance, housing support, childcare assistance  1) You will need a device that can access the internet (smartphone, tablet, etc)  2) You will need electronic copies of documents such as bank statements, rent receipts, or medical bills  3) Visit this website: https://mnb[place]efits.[place].gov/  4) Follow the prompts to complete the application.  5) Your application submission is the earliest date your benefits can begin  6) Look for a letter in the mail or a phone call from your [facility] or [facility].  They will reach out for the interview portion.  If you are in need of further support, you can reach out to your [facility]'s Department of Human Services for assistance.      Applying for Medical Assistance ([place]) and [place] (this information is also available online at [place]hs/people-we-serve/children-and-families/health-care/health-care-programs/programs-and-services/)    This information is for people in families with children and people who are adults under age 65 who do not have children living with them and do not have a disability:    1) Apply online at auth.Crossb[employer].org/RIDP/?account_type=Individual   A) Here are some tips for completing the online application: www.[employer].org/new-customers/apply/index.jsp  2) Print application from: edocs.dhs.state.[place]/lfserver/Public/DHS-6696-ENG  3) For assistance or to request a paper application, Call  Nessa at [phone] ([phone] outside the [facility])      This information is for people with disabilities who are younger than 65 years old:    1) Apply online at auth.[place].org/RIDP/?account_type=Individual   A) Here are some tips for completing the online application: www.[place].org/new-customers/apply/index.jsp  2) Print application from: [employer]./lfserver/Public/DHS-6696-ENG  3) Call [phone] or [phone] and ask to have an application mailed to you  4) Call or go to your [facility]'s [place] office   A) [facility]  [address]  [place] [address] [phone]  B) https://[place].gov/dhs/people-we-serve/people-with-disabilities/health-care/health-care-programs/contact-us/ujnkei-vbwaby-xygzukc.jsp      This information is for people who are 65 years old or older:    1) Apply for MA using a paper application and mail the application or take it to your county or [place] office.  A) Print application from: [employer].[facility]/lfUnion [employer]/Public/DHS-6696-ENG  2) Call [phone] or [phone] and ask to have an application mailed to you  3) Call or go to your [facility]'s [place] office   A) [facility] Department  [address]  [place] [address] [phone]  B) https://[place].gov/dhs/people-we-serve/people-with-disabilities/health-care/health-care-programs/contact-us/umavza-yvkcql-zrrycje.jsp      Frequently Asked Questions about the Application Process:    1) Where can I get help applying?   A) If you have a disability: From the [place] at [phone]  B) If you are 65 years old or older: From the [facility]  at [phone]  C) If you do not have a disability and you are not 65 years or older: Call [employer] at [phone] ([phone] outside the [facility])  2) Does it matter when I apply?   Yes, the date we receive your application online or we get your paper application affects  when your coverage can start. Coverage may start:  The first day of the month we get your application,  Three months before the month we get your application,  The month after your eligibility is approved, or  The first day of the month after you pay your first premium, if you have one.  3) What happens after I submit my application?  If you apply online through [employer], we usually tell you right away if you qualify. If you apply online or with a paper application and we need more information to decide if you can get coverage, you will get a letter in the mail telling you what information we need.  4) How will I know if I can get coverage?   If you apply online at [employer], we usually tell you right away if you or any household member:  Can get coverage and if you will have to pay for it  Cannot get coverage and why  You also will get a letter telling you if you and your family members qualify. The letter also tells you how you can ask for a review of the decision(s) if you do not agree with it.  If you apply with a paper application, we will send you a letter telling you if you or any household members:  Can get coverage and if you will have to pay for it  Cannot get coverage and why  The letter also tells you how to ask for a review of a decision, if you disagree.

## 2025-05-21 NOTE — PROGRESS NOTES
[facility]    Medicine Progress Note - Hospitalist Service, GOLD TEAM 22    Date of Admission:  5/20/2025    Assessment & Plan   Rolanda Molina is a 74 year old female admitted on 5/20/2025.  History notable for ventriculomegaly, glaucoma with blindness of right eye, CVA, hypertension, recurrent falls.  Admitted to medicine after recurrent falls at home. OT recommendation for TCU, patient declining. Will continue to work on safe dispo planning.      #Recurrent falls  #Unsteadiness  #Intermittent confusion  #Right eye blindness  #Failure to thrive  History of multiple recurrent falls.  Recent admission 4/23 to 4/28 for similar concern.  Initially was resistant at the end of previous admission to pursue TCU care.  Was then readmitted day of discharge willing to pursue TCU, however ultimately did not qualify for TCU placement.  Presented to emergency department 5/20 due to mechanical fall with prolonged time on floor.  No significant trauma noted within emergency department.  Head/C-spine CT negative.  Possible TCU placement as recommended during previous admission.   - OT/PT consult  -Social work consult  - Up with assist  - Fall precautions/bed alarm  - Delirium precautions  - Patient declining TCU - consideration for MMSE per OT tomorrow. Will obtain collateral from family as well.      #Ventriculomegaly   #Possible normal pressure hydrocephalus versus ex-vacuo dilatation   History of Ventriculomegaly.  Has been assessed by [facility] neurology during admissions 10/2024, 4/2025 for the concern of possible normal pressure hydrocephalus.  Noted in April that CT head showed stable stable mild to moderately dilated lateral and third ventricles with mild cerebral parenchymal volume loss.  Neurology was unable to assess gait. Unable to definitively determine if symptoms of NPH present.  Deferred LP at that time.  Recommended checking serologies with outpatient neurology  "follow-up.  Per chart review has not get followed up with neurology  - Consulted neurology for possible updated recs     #Hypertension  Upon arrival blood pressure 172/124. Improved to 150/101.  Reports did not take a.m. losartan  - Resume PTA losartan     #History of CVA  Found to have MRI evidence of multifocal strokes suggestive of embolic disease during confusion for workup for confusion insult 2024.  Did not follow-up with neurology and was not taking DAPT.  Started on aspirin during 4/2025 admission.  - Continue PTA aspirin  -PTA atrovastatin     #Thrombocytopenia   - No clinical evidence of bleeding, no heparin given this admission.   - trend PLT, low threshold for peripheral smear if ongoing downtrend.     #Elevated serum ketones  #NAGMA   #Possible failure to thrive/possible poor p.o. intake  On admission noted to have serum ketones 0.94.  Very mild anion gap of 16. Reports adequate p.o. intake.   -IV fluid resuscitation  - Trend BMP   - Encourage p.o. intake     #History of subacute angle-closure glaucoma  #Right eye blindness  - Continue PTA eyedrops          Diet: Combination Diet Regular Diet Adult    DVT Prophylaxis: Pneumatic Compression Devices  Ornelas Catheter: Not present  Lines: None     Cardiac Monitoring: None  Code Status: No CPR- Pre-arrest intubation OK    Clinically Significant Risk Factors Present on Admission                 # Drug Induced Platelet Defect: home medication list includes an antiplatelet medication   # Hypertension: Noted on problem list           # Overweight: Estimated body mass index is 25.53 kg/m  as calculated from the following:    Height as of this encounter: 1.702 m (5' 7\").    Weight as of 4/23/25: 73.9 kg (163 lb).       # Financial/Environmental Concerns:           Social Drivers of Health    Tobacco Use: High Risk (5/20/2025)    Patient History     Smoking Tobacco Use: Every Day     Smokeless Tobacco Use: Never          Disposition Plan     Medically Ready for " Discharge: Anticipated Tomorrow             Magi Aiken DO  Hospitalist Service, GOLD TEAM 22  [facility]  Securely message with [employer] (more info)  Text page via [employer] Paging/Directory   See signed in provider for up to date coverage information  ______________________________________________________________________    Interval History   No acute events overnight. She feels well. She describes her fall as a slip and fall on slippery linoleum floor upon getting into bed with inability to get herself back up. She denies  prodrome or head or extremity injury and otherwise feels well. She does not feel unsteady on her feet with ambulation but states her son thinks she has been shuffling as of late. She has had some issues with urinary incontinence at night, denies issues with this during the day. She otherwise is tolerating PO and feels well. She is declining TCU at this time.     Physical Exam   Vital Signs: Temp: 98.1  F (36.7  C) Temp src: Oral BP: (!) 145/93 Pulse: 82   Resp: 17 SpO2: 99 %      Weight: 0 lbs 0 oz    General: sitting upright in bed, no acute distress, eating lunch   NECK: Supple, with no masses, no rigidity   CV: RRR, no m/r/g. Extremities are warm and well perfused.   LUNGS: CTAB, no w/r/c.  SKIN: Warm, well perfused. No skin rashes or abnormal lesions.  MSK: No deformities. No clubbing, cyanosis, or edema.  NEURO: Alert and appropriate. No focal deficits.    Medical Decision Making           Data     I have personally reviewed the following data over the past 24 hrs:    6.4  \   12.7   / 96 (L)     138 108 (H) 13.8 /  101 (H)   3.8 17 (L) 0.80 \     ALT: 16 AST: 21 AP: 94 TBILI: 0.5   ALB: 4.5 TOT PROTEIN: 7.7 LIPASE: N/A       Imaging results reviewed over the past 24 hrs:   Recent Results (from the past 24 hours)   CT Head w/o Contrast    Narrative    EXAM: CT CERVICAL SPINE W/O CONTRAST, CT HEAD W/O CONTRAST  LOCATION: [facility]  DATE: 5/20/2025    INDICATION: Fall  COMPARISON:  April 23, 2025.  TECHNIQUE:   1) Routine CT Head without IV contrast. Multiplanar reformats. Dose reduction techniques were used.  2) Routine CT Cervical Spine without IV contrast. Multiplanar reformats. Dose reduction techniques were used.    FINDINGS:   HEAD CT:   INTRACRANIAL CONTENTS: No intracranial hemorrhage, extraaxial collection, or mass effect.  No CT evidence of acute infarct. Severe presumed chronic small vessel ischemic changes. Ventriculomegaly disproportionate to the degree of volume loss. Correlate   for normal pressure hydrocephalus.     VISUALIZED ORBITS/SINUSES/MASTOIDS: No intraorbital abnormality. No significant paranasal sinus mucosal disease. No middle ear or mastoid effusion.    BONES/SOFT TISSUES: No acute abnormality.    CERVICAL SPINE CT:   VERTEBRA: Straightening of the usual cervical lordosis. No acute compression fracture or posttraumatic subluxation.     CANAL/FORAMINA: Multilevel spondylosis without high grade canal stenosis.    PARASPINAL: No prevertebral edema.      Impression    IMPRESSION:  HEAD CT:  1.  No acute intracranial process.    CERVICAL SPINE CT:  1.  No CT evidence for acute fracture or post traumatic subluxation.   CT Cervical Spine w/o Contrast    Narrative    EXAM: CT CERVICAL SPINE W/O CONTRAST, CT HEAD W/O CONTRAST  LOCATION: [facility]  DATE: 5/20/2025    INDICATION: Fall  COMPARISON:  April 23, 2025.  TECHNIQUE:   1) Routine CT Head without IV contrast. Multiplanar reformats. Dose reduction techniques were used.  2) Routine CT Cervical Spine without IV contrast. Multiplanar reformats. Dose reduction techniques were used.    FINDINGS:   HEAD CT:   INTRACRANIAL CONTENTS: No intracranial hemorrhage, extraaxial collection, or mass effect.  No CT evidence of acute infarct. Severe presumed chronic small vessel ischemic changes.  Ventriculomegaly disproportionate to the degree of volume loss. Correlate   for normal pressure hydrocephalus.     VISUALIZED ORBITS/SINUSES/MASTOIDS: No intraorbital abnormality. No significant paranasal sinus mucosal disease. No middle ear or mastoid effusion.    BONES/SOFT TISSUES: No acute abnormality.    CERVICAL SPINE CT:   VERTEBRA: Straightening of the usual cervical lordosis. No acute compression fracture or posttraumatic subluxation.     CANAL/FORAMINA: Multilevel spondylosis without high grade canal stenosis.    PARASPINAL: No prevertebral edema.      Impression    IMPRESSION:  HEAD CT:  1.  No acute intracranial process.    CERVICAL SPINE CT:  1.  No CT evidence for acute fracture or post traumatic subluxation.

## 2025-05-21 NOTE — H&P
[facility]    History and Physical - Hospitalist Service, GOLD TEAM        Date of Admission:  5/20/2025    Assessment & Plan      Rolanda Molina is a 74 year old female admitted on 5/20/2025.  History notable for ventriculomegaly, glaucoma with blindness of right eye, CVA, hypertension, recurrent falls.  Admitted to medicine after recurrent falls at home.    #Recurrent falls  #Unsteadiness  #Intermittent confusion  #Right eye blindness  #Failure to thrive  History of multiple recurrent falls.  Recent admission 4/23 to 4/28 for similar concern.  Initially was resistant at the end of previous admission to pursue TCU care.  Was then readmitted day of discharge willing to pursue TCU, however ultimately did not qualify for TCU placement.  Presented to emergency department 5/20 due to mechanical fall with prolonged time on floor.  No significant trauma noted within emergency department.  Head/C-spine CT negative.  Possible TCU placement as recommended during previous admission  - OT/PT consult  -Social work consult  - Up with assist  - Fall precautions/bed alarm  - Delirium precautions    #Ventriculomegaly   #Possible normal pressure hydrocephalus versus ex-vacuo dilatation   History of Ventriculomegaly.  Has been assessed by [facility] neurology during admissions 10/2024, 4/2025 for the concern of possible normal pressure hydrocephalus.  Noted in April that CT head showed stable stable mild to moderately dilated lateral and third ventricles with mild cerebral parenchymal volume loss.  Neurology was unable to assess gait April.  Unable to definitively determine if symptoms of NPH present.  Deferred LP at that time.  Recommended checking serologies with outpatient neurology follow-up.  Per chart review has not get followed up with neurology  - Consulted neurology for possible updated recs    #Hypertension  Upon arrival blood pressure 172/124. Improved to 150/101.  Reports did  "not take a.m. losartan  - Resume PTA losartan    #History of CVA  Found to have MRI evidence of multifocal strokes suggestive of embolic disease during confusion for workup for confusion insult 2024.  Did not follow-up with neurology and was not taking DAPT.  Started on aspirin during 4/2025 admission.  - Continue PTA aspirin  -PTA atrovastatin    #Elevated serum ketones  #Possible failure to thrive/possible poor p.o. intake  On admission noted to have serum ketones 0.94.  Very mild anion gap of 16. Reports adequate p.o. intake.   -IV fluid resuscitation  - Recheck BMP, ketones   - Encourage p.o. intake    #History of subacute angle-closure glaucoma  #Right eye blindness  - Continue PTA eyedrops          Diet: Regular diet  DVT Prophylaxis: Pneumatic Compression Devices  Ornelas Catheter: Not present  Lines: None     Cardiac Monitoring: None  Code Status: Full    Clinically Significant Risk Factors Present on Admission                 # Drug Induced Platelet Defect: home medication list includes an antiplatelet medication   # Hypertension: Noted on problem list           # Overweight: Estimated body mass index is 25.53 kg/m  as calculated from the following:    Height as of this encounter: 1.702 m (5' 7\").    Weight as of 4/23/25: 73.9 kg (163 lb).       # Financial/Environmental Concerns:           Disposition Plan     Medically Ready for Discharge: Anticipated Tomorrow         The patient's care was discussed with the Attending Physician, Dr. Elier Kamara.    Kentrell Diana PA-C  Hospitalist Service, [facility]  Securely message with [employer] (more info)  Text page via [employer] Paging/Directory   See signed in provider for up to date coverage information    ______________________________________________________________________    Chief Complaint   Recurrent falls    History is obtained from the patient    History of Present Illness   Rolanda Molina is a 74 " year old female admitted on 5/20/2025.  History notable for ventriculomegaly, glaucoma with blindness of right eye, CVA, hypertension, recurrent falls.  Admitted to medicine after recurrent fall at home.  Patient emergency department.  States that they fell out of their bed this a.m.  Did have to wait until granddaughter came.  Writer's initial understanding is that patient was trying to pursue TCU placement, however patient did not seem interested in speaking with the patient in the emergency department.  Agreeable to plan of being assessed by OT/PT. reports no other acute concerns.  No new neurological symptoms.       Past Medical History    Past Medical History:   Diagnosis Date    Hypertension        Past Surgical History   Past Surgical History:   Procedure Laterality Date    CATARACT IOL, RT/LT      COLONOSCOPY N/A 08/30/2021    Procedure: Colonoscopy, With Polypectomy And Biopsy;  Surgeon: Federico Vital MD;  Location: [facility]    PHACOEMULSIFICATION WITH STANDARD INTRAOCULAR LENS IMPLANT Left 12/23/2024    Procedure: LEFT EYE COMPLEX PHACOEMULSIFICATION, CATARACT, WITH STANDARD INTRAOCULAR LENS IMPLANT INSERTION;  Surgeon: Ashu Grey MD;  [place] [facility] [place]       Prior to Admission Medications   Prior to Admission Medications   Prescriptions Last Dose Informant Patient Reported? Taking?   aspirin (ASA) 81 MG EC tablet Unknown  No No   Sig: Take 1 tablet (81 mg) by mouth daily.   atorvastatin (LIPITOR) 40 MG tablet 5/19/2025  No Yes   Sig: Take 1 tablet (40 mg) by mouth every evening.   brimonidine (ALPHAGAN) 0.2 % ophthalmic solution 5/20/2025  No Yes   Sig: Place 1 drop into the right eye 3 times daily.   dorzolamide-timolol (COSOPT) 2-0.5 % ophthalmic solution 5/20/2025  No Yes   Sig: Place 1 drop into both eyes 2 times daily.   latanoprost (XALATAN) 0.005 % ophthalmic solution 5/19/2025  No Yes   Sig: Place 1 drop into the right eye at bedtime.   losartan (COZAAR) 25 MG tablet 5/19/2025  No Yes    Sig: Take 0.5 tablets (12.5 mg) by mouth daily.      Facility-Administered Medications: None          Physical Exam   Vital Signs: Temp: 98.1  F (36.7  C) Temp src: Oral BP: (!) 172/124 Pulse: 98   Resp: 16 SpO2: 98 %      Weight: 0 lbs 0 oz    Exam:  General: Appears stated age, no acute distress  Head: Normocephalic.abnormalities  Cardiovascular: S1/S2, Normal rate and rhythm   Respiratory: Normal respiratory effort, lung fields clear to auscultation  Gastrointestinal: Bowel sounds normal, no tenderness to palpation   Musculoskeletal: normal muscle tone  Neuropsych: Speaks clearly, answers questions appropriately,     Medical Decision Making       75 MINUTES SPENT BY ME on the date of service doing chart review, history, exam, documentation & further activities per the note.      Data   ------------------------- PAST 24 HR DATA REVIEWED -----------------------------------------------

## 2025-05-21 NOTE — CONSULTS
Care Management Initial Consult    General Information  Assessment completed with: Patient,    Type of CM/SW Visit: Initial Assessment    Primary Care Provider verified and updated as needed:  (Does not have PCP; new PCP appt arranged, see AVS for details)   Readmission within the last 30 days: previous discharge plan unsuccessful   Return Category: Medically unsuccessful treatment plan  Reason for Consult: discharge planning  Advance Care Planning: Advance Care Planning Reviewed: no concerns identified          Communication Assessment  Patient's communication style: spoken language (English or Bilingual)    Hearing Difficulty or Deaf: no   Wear Glasses or Blind: yes (left t home)    Cognitive  Cognitive/Neuro/Behavioral: WDL                      Living Environment:   People in home: alone     Current living Arrangements: apartment      Able to return to prior arrangements: no (Not feasible to return home at this time; TCU recommended, pt declines)       Family/Social Support:  Care provided by: self, child(kt)  Provides care for: grandchild(kt) (pt reports occasionally looking after grandkids)  Marital Status: Single  Support system: Children (pt reports having 3 daughters and 2 sons that all live locally; she reports her children visit on weekends to provide some help)          Description of Support System: Supportive, Involved    Support Assessment: Adequate social supports, Lacks necessary supervision and assistance, Lacks adequate physical care    Current Resources:   Patient receiving home care services: No        Community Resources: None  Equipment currently used at home: walker, rolling, shower chair  Supplies currently used at home: Incontinence Supplies (uses briefs overnight)    Employment/Financial:  Employment Status: retired        Financial Concerns: none (per pt statement)   Referral to Financial Worker: No       Does the patient's insurance plan have a 3 day qualifying hospital stay waiver?   Yes     Which insurance plan 3 day waiver is available? Alternative insurance waiver    Will the waiver be used for post-acute placement? Undetermined at this time    Lifestyle & Psychosocial Needs:  Social Drivers of Health     Food Insecurity: Low Risk  (4/24/2025)    Food Insecurity     Within the past 12 months, did you worry that your food would run out before you got money to buy more?: No     Within the past 12 months, did the food you bought just not last and you didn t have money to get more?: No   Depression: Not at risk (2/6/2025)    PHQ-2     PHQ-2 Score: 0   Housing Stability: Low Risk  (4/24/2025)    Housing Stability     Do you have housing? : Yes     Are you worried about losing your housing?: No   Tobacco Use: High Risk (5/20/2025)    Patient History     Smoking Tobacco Use: Every Day     Smokeless Tobacco Use: Never     Passive Exposure: Not on file   Financial Resource Strain: Low Risk  (4/24/2025)    Financial Resource Strain     Within the past 12 months, have you or your family members you live with been unable to get utilities (heat, electricity) when it was really needed?: No   Alcohol Use: Not on file   Transportation Needs: Low Risk  (4/24/2025)    Transportation Needs     Within the past 12 months, has lack of transportation kept you from medical appointments, getting your medicines, non-medical meetings or appointments, work, or from getting things that you need?: No   Physical Activity: Not on file   Interpersonal Safety: Low Risk  (5/21/2025)    Interpersonal Safety     Do you feel physically and emotionally safe where you currently live?: Yes     Within the past 12 months, have you been hit, slapped, kicked or otherwise physically hurt by someone?: No     Within the past 12 months, have you been humiliated or emotionally abused in other ways by your partner or ex-partner?: No   Stress: Not on file   Social Connections: Not on file   Health Literacy: Not on file       Functional  Status:  Prior to admission patient needed assistance:   Dependent ADLs:: Ambulation-walker  Dependent IADLs:: Cleaning, Laundry, Meal Preparation  Assesssment of Functional Status:  (Note that while pt reports being primarily independent w/ I/ADLs, she is not presently successful independently at home)    Mental Health Status:  Mental Health Status: No Current Concerns (per pt statement)       Chemical Dependency Status:  Chemical Dependency Status: No Current Concerns (per pt statement)             Values/Beliefs:  Spiritual, Cultural Beliefs, Adventist Practices, Values that affect care: no (per pt statement)               Discussed  Partnership in Safe Discharge Planning  document with patient/family: No    Additional Information:  CM/SW and SW consults received for discharge planning purposes.    10:00am - Case discussed in rounds this AM.  Per provider, PT/OT evals are needed.  Provider explained that pt was previously admitted and discharged to home w/o services, as she was not eligible for TCU placement or home care.  Pt was unsuccessful at home and re-presents to the hospital d/t recurrent falls and failure to thrive.    Reviewed chart and previous admission's care mgmt discharge note.  It was thought that pt did not qualify for TCU and home care d/t obs status and lack of PCP.  Pt has a [facility] Medicare Advantage plan, which waives 3 day IP hospital stay requirement for TCU - if recommended, pt would qualify for insurance payment of TCU, regardless of obs status.  Regarding home care, a PCP appt for sometime shortly after discharge can be arranged, and care mgmt can negotiate w/ HC agency to find an agency willing to accept pt on the contingency that she attend the appt to establish care w/ PCP.    12:40pm - Received update from provider that TCU is recommended, but pt is declining this in favor of returning home.  Provider inquired about PCA services or other supports that can be arranged.  Explained that PCA  cannot be arranged before discharge, but home care can be explored and resources can be provided for pt/family to follow up on to attempt to arrange additional supports in the long term.  Provider acknowledged, stated that if pt is cleared by neurology today, she would likely be med-ready for discharge.    Based on note from neuro provider, it seems that pt would be cleared for discharge today.    Added [facility] Line and [facility] [place] information to AVS.  Pt/family can contact these entities for further guidance on seeking additional supports/services.    Met w/ pt at bedside, introduced self and role, conducted CMA, and discussed discharge planning/recommendations.  See flowsheets for CMA data.  Confirmed home address, pt contacts, and phone numbers.  Regarding PCP, pt reported she thinks she has a PCP w/in  [facility] system, but does not know who it is.  Discussed TCU, pt continues to decline this rec.  Discussed home care, explained that it would offer much less support than TCU and each discipline may only see pt once/wk, for example.  Despite this, pt maintained that she would prefer home w/ HC instead of TCU.  Explained HC referral process and explained what PT/OT/RN/SW/HHA could assist with.  Pt was agreeable to a referral for all recommended disciplines, deferred to this writer for HC agency selection.  Explained to pt that PCP is required for home care and that she would need to go to a PCP appt.  Asked pt if she would be okay w/ this writer making an appt for her, and that if this writer cannot identify her PCP, that she be set up w/ a new one.  Pt was in agreement, did not express preferences regarding gender or location, so will find any provider closest to home w/in reasonable time frame.  Regarding level of independence and assistance received at home, pt reported that she feels she is too independent for her own good, explaining that she tries to do everything on her own.  She stated that  she has 5 children and some grandchildren (some of whom are under age 3, so she helps to watch after them from time to time).  She explained that all her children live locally, but visit on weekends d/t having to work during the week.  She explained that when they visit, they assist w/ cleaning, cooking, laundry, and other household tasks.  Pt would like other people to provide additional assistance w/ these things when family is unavailable, if possible.  To address this request, discussed [facility] and [facility] resources that were added to AVS and explained what they may be able to help with/provide guidance on.  Discussed medical assistance, pt reported she hasn't applied.  Informed her that she would need <$3k of assets, pt reported she meets this qualification.  Advised pt to apply, explained that MA can help to cover costs of additional supports (i.e., PCA, homemaking) that pt reports she cannot afford out of pocket.  Pt expressed understanding.  Informed pt that this writer will add info on applying for MA and applying for [facility] benefits to AVS, pt agreeable.  Advised pt to have family assist her in the application processes, pt felt this would be helpful.  Pt's only request at this time was r/t DME.  She reported she would like a raised toilet seat (RTS).  Informed her that this writer can follow up on that w/ the therapy teams.  Additionally, pt provided verbal permission for this writer to coordinate w/ her dtr, Bushra, as needed.    Added additional resources to AVS: Info on applying for [place] and info on applying for [facility] benefits.    Reviewed chart for PCP info - it seems likely that pt does not have an established PCP, despite her claim.    Called priority scheduling line, spoke w/ Hector, he scheduled appt to establish care w/ [facility] P[facility] (details also on AVS for pt/family reference):  Date/Time: 5/23/2025 at 11:30am (arrive by 11:10am)  Provider: Pedro Wang PA-C  Location: [address]  [facility] [facility] [address], [place] [address] ([facility])    Sent referral to [facility] for PT/OT/RN/SW/HHA.  Provided info on upcoming PCP appt.  Stated that in the event the [facility] is struggling to secure all requested services, to take whatever they can get, w/ a primary focus on PT/OT/RN.    Entered home care referral order for provider review/signature.  Included info on upcoming PCP appt for HC agency reference.    Reached out to PT/OT, notified them of potential discharge today, pt's declination of TCU, and request for RTS DME item.  OT reported that pt cannot stand on her own at this time.  OT suggested that while an additional day of therapies likely would not make a considerable difference, it would allow for some extra time to coordinate w/ family to see if they can provide further assistance for pt post-discharge.    Notified provider of info from OT, provider acknowledged, will not discharge pt today.    2:06pm - Called Bushra, left VM requesting call back to discuss discharge planning.  Did not leave pt identifying info, as VM greeting was generic.    2:42pm - Received VM from Bushra requesting call back at [phone].    CM/AC sticky note indicates pt was accepted for services by [facility].  Called Myesha at [facility] to follow up regarding SOC, was informed that [facility] reported they can accept for services provided they receive orders/documentation from the PCP appt on 5/23.    2:49pm - Called Bushra, notified her of the discharge recs and pt's declination.  Explained what has been arranged in preparation for a discharge to home.  Provided PCP appt info, emphasized importance of pt attending this appt to ensure [facility] provides services.  Bushra expressed understanding, was able to confirm that pt needed new PCP, as her previous PCP retired.  Also provided education on the resources that this writer has added to the discharge paperwork.  Bushra expressed understanding and appreciated the  information.  She explained that she agrees w/ the care team that pt should not be going home and reported that speaking to pt in a manner that gives her options/choices in terms of disposition will result in pt electing to go home.  Informed her that the care team is not able to force pt to do something she does not wish to do, but that the care team can explain why TCU is the primary rec and why return to home would not be in her best interest.  She expressed understanding of how the care team must give options to pts.  Informed her that she is welcome to speak w/ her mother about her concerns and encourage TCU, she stated she will do this.  She is open to re-connecting tomorrow to discuss discharge planning further.  She is aware that if pt continues to decline TCU, she will ultimately discharge to home w/ as much support as is able to be arranged.    3:30pm - Received update from PT that pt reported she may be able to stay w/ her sister post-discharge to get additional assistance.  Unclear who pt's sister is, as the only contact on file at this time is her daughter, Bushra.      Next Steps:  - Follow up w/ Bushra and pt for further discharge planning (pt declines TCU and reported to PT that she may be able to stay w/ her sister post-discharge; Bushra intends to attempt to convince pt to consider TCU)    - In the event pt remains hospitalized after tomorrow (5/22), reschedule appt to establish care w/ [facility]  PCP (required for home care and ideal for general post-hospitalization follow up)    - In the event pt opts to discharge to TCU:  Provide Medicare Care Compare List, collect preferences, and send referrals    - In the event pt opts to discharge to home:  Notify pt of accepting home care agency/services retained  Send message to the PCP that pt will be seeing to notify them of discharge plan and request that they provide orders/clinic visit documentation to [facility]  Send orders to [facility] at discharge  "(PT/OT/RN/SW/HHA; nursing indicated for post-hospitalization assessments, education, disease mgmt, and med mgmt)  Advise Bushra to coordinate w/ pt's other children to provide as much assistance to pt as possible            ALCIDES Grover  [facility]  Units 8M/S & 10 ICU  Reachable on [employer] - Search by name or search \"RNCC\"  Phone: [phone]    "

## 2025-05-21 NOTE — CONSULTS
[facility]    Patient Name:  Rolanda Molina  MRN:  [id number]    :  1950  Date of Service:  May 20, 2025  Primary care provider:  No Ref-Primary, Physician      Neurology consultation service was asked to see Rolanda Molina by Kentrell Diana PA-C to evaluate falls.    Chief Complaint: Fall    History of Present Illness:   Rolanda Molina is a 74F h/o HTN, glaucoma w/ R-eye blindness, CVA, ventriculomegaly with prior suspicion for ex vacuo vs less likely NPH, and recurrent admissions for falls in the setting of gait impairment of at least 1 year duration presenting after mechanical fall. Neurology is consulted regarding regarding her ongoing falls, with concern for NPH, given that she has not yet been able to follow-up outpatient as planned after a very similar admission this past April.        Per discussion with Ms. Molina, she caught her foot on the bed as she was attempting to et into, tripped, and fell without significant trauma; no head strike.  She was unable to get herself off the floor and ultimately got a hold of her granddaughter, who called 911.  CTH and CT C-spine were obtained in the ED, which showed stable ventriculomegaly and were without acute findings.      Patent lives alone and completes all the cooking and cleaning herself. She does note that it would nice to have someone help with cleaning and cares once in a while. She continues to use walker while ambulating (started using walker about 6 months ago). Denies any falls while walking. She tells me she mostly sits or lies down throughout the day. Additionally notes that she wears a brief solely at night as she frequently is incontinent of urine.     She had a very similar presentation this past April (fall while getting out of bed), CTH was obtained which showed ventriculomegaly, and concern was raised for NPH.  She was seen by our team in which exam at the time was  "notable for broad based station with impaired proprioception, basic neuropathy labs returned with prediabetic A1C (5.9), normal B12 (605), normal folate, negative serum immunofixation.  Hydrocephalus was felt to be more likely ex vacuo.    Of note, she has been previously seen for this concern by Dr. Jason of neurology while inpatient in September of last year. MRI was obtained 10/20/24 notable for subacute infarcts (now on ASA 81 and high-intensity statin; TTE and CTA without actionable findings), as well as ventriculomegaly as above.  High volume LP was planned, however patient and family decided they would like to follow-up in clinic prior to pursuing LP, but were lost to follow-up.      ROS  A comprehensive ROS was performed and pertinent findings were included in HPI.     PMH  Past Medical History:   Diagnosis Date    Hypertension      Past Surgical History:   Procedure Laterality Date    CATARACT IOL, RT/LT      COLONOSCOPY N/A 08/30/2021    Procedure: Colonoscopy, With Polypectomy And Biopsy;  Surgeon: Federico Vital MD;  Location: [facility]    PHACOEMULSIFICATION WITH STANDARD INTRAOCULAR LENS IMPLANT Left 12/23/2024    Procedure: LEFT EYE COMPLEX PHACOEMULSIFICATION, CATARACT, WITH STANDARD INTRAOCULAR LENS IMPLANT INSERTION;  Surgeon: Ashu Grey MD;  Location: [facility]       Medications   I have personally reviewed the patient's medication list.     Allergies  I have personally reviewed the patient's allergy list.     Physical Examination   Vitals: BP (!) 152/101   Pulse 96   Temp 97.6  F (36.4  C)   Resp 17   Ht 1.702 m (5' 7\")   LMP  (LMP Unknown)   SpO2 99%   BMI 25.53 kg/m    General: Lying in bed, NAD  Head: NC/AT  Eyes: no icterus, op pink and moist  Cardiac: Extremities warm, no edema.   Respiratory: non-labored on RA  Skin: No rash or lesion on exposed skin  Psych: Mood pleasant, affect congruent  Neuro:  Mental status: Awake, alert, attentive, oriented to self, time, place, and " circumstance. Language is fluent and coherent with intact comprehension of complex commands, naming and repetition.   Cranial nerves: Right eye blind, visual fields intact to left eye, EOMI, facial sensation intact, face symmetric, shoulder shrug strong, tongue/uvula midline, no dysarthria.   Motor: Normal bulk and tone. No abnormal movements. 4+/5 strength bilaterally in deltoids, 5/5 biceps, 5/5 triceps, 5/5 hand , 4+/5 hip flexors, 5/5 knee flexion, 5/5 knee extension, 5/5 plantarflexion, 5/5 dorsiflexion.   Reflexes: Normo-reflexic and symmetric biceps, brachioradialis, triceps, patellae, and achilles. No clonus, toes down-going.  Sensory: Intact to light touch and pin prick in proximal and distal aspects of all 4 extremities. Impaired vibratory sensation at distal lower extremities, <3 seconds at bilateral great toes and ~5 seconds at bilateral medial malleolus.   Coordination: FNF without ataxia or dysmetria.   Gait: Trouble with standing, needed assistance with gait belt. Wide based gait with slow, small strides, uses walker for assistance. No magnetic or shuffling of gait noted    Investigations   I have personally reviewed pertinent labs, tests, and radiological imaging. Discussion of notable findings is included under Impression.     #CTH  HEAD CT:  1.  No acute intracranial process.     CERVICAL SPINE CT:  1.  No CT evidence for acute fracture or post traumatic subluxation.    #MRI Brain  IMPRESSION:  1. Extensive confluent white matter signal abnormality in both  cerebral hemispheres, probably severe chronic small vessel ischemic  disease. Foci of restricted diffusion possibly representing subacute  infarcts in the right parietal lobe, corpus callosum, and left frontal  lobe.  2. Stable moderate ventriculomegaly, perhaps normal pressure  hydrocephalus.  3. Small presumed meningioma over the left frontal lobe measuring up  to 1.8 cm.    Impression  #Recurrent falls  #Stable ventriculomegaly on head  imaging, likely ex-vacuo dilation    Ms Molina is a 74 year old woman with a h/o HTN, glaucoma w/ R-eye blindness, recurrent falls in the setting of chronic/stable ventriculomegaly with prior suspicion for ex vacuo vs less likely NPH. She was admitted 5/20 for concern of fall while attempting to get into bed. Neurology is consulted regarding regarding her ongoing falls, with concern for NPH, given that she has not yet been able to follow-up outpatient as planned after a very similar admission this past April.    I was able to exam her gait today which is not consistent with NPH. I would expect to see a magnetic, shuffling gait with NPH and on exam today, she had a wide based gait, with small, short strides, and was able to pick her feet up off ground. She likely has multifactorial gait instability secondary to her vision loss, peripheral neuropathy, and deconditioning. The significant degree of atrophy noted on her CTH is likely causing the ventricles to appear enlarged, which makes ex-vacuo dilation more probable rather than NPH. She would likely not be a great candidate for NPH interventions such as ventricular drain. For now, would recommend PT/OT and follow up outpatient with neurology whom can follow gait long-term.    Recommendations  -PT/OT  -Follow up with outpatient neurology  -No further work up and recommendations from inpatient team, will sign off    Thank you for involving Neurology in the care of Rolanda Molina.  Please do not hesitate to call with questions/concerns (consult pager [phone]).      Patient was discussed with Dr. Avila.    Geovanna Coppola PA-C    75 MINUTES SPENT BY ME on the date of service doing chart review, history, exam, documentation & further activities per the note.

## 2025-05-22 ENCOUNTER — APPOINTMENT (OUTPATIENT)
Dept: OCCUPATIONAL THERAPY | Facility: CLINIC | Age: 75
End: 2025-05-22
Payer: COMMERCIAL

## 2025-05-22 ENCOUNTER — APPOINTMENT (OUTPATIENT)
Dept: PHYSICAL THERAPY | Facility: CLINIC | Age: 75
End: 2025-05-22
Payer: COMMERCIAL

## 2025-05-22 VITALS
BODY MASS INDEX: 25.02 KG/M2 | TEMPERATURE: 97.5 F | RESPIRATION RATE: 16 BRPM | OXYGEN SATURATION: 99 % | SYSTOLIC BLOOD PRESSURE: 126 MMHG | HEART RATE: 80 BPM | DIASTOLIC BLOOD PRESSURE: 77 MMHG | HEIGHT: 67 IN | WEIGHT: 159.4 LBS

## 2025-05-22 LAB
ALBUMIN UR-MCNC: NEGATIVE MG/DL
ANION GAP SERPL CALCULATED.3IONS-SCNC: 14 MMOL/L (ref 7–15)
APPEARANCE UR: ABNORMAL
BACTERIA #/AREA URNS HPF: ABNORMAL /HPF
BILIRUB UR QL STRIP: NEGATIVE
BUN SERPL-MCNC: 17.6 MG/DL (ref 8–23)
CALCIUM SERPL-MCNC: 9.1 MG/DL (ref 8.8–10.4)
CHLORIDE SERPL-SCNC: 106 MMOL/L (ref 98–107)
COLOR UR AUTO: ABNORMAL
CREAT SERPL-MCNC: 0.97 MG/DL (ref 0.51–0.95)
EGFRCR SERPLBLD CKD-EPI 2021: 60 ML/MIN/1.73M2
ERYTHROCYTE [DISTWIDTH] IN BLOOD BY AUTOMATED COUNT: 15 % (ref 10–15)
GLUCOSE SERPL-MCNC: 99 MG/DL (ref 70–99)
GLUCOSE UR STRIP-MCNC: NEGATIVE MG/DL
HCO3 SERPL-SCNC: 20 MMOL/L (ref 22–29)
HCT VFR BLD AUTO: 39.7 % (ref 35–47)
HGB BLD-MCNC: 12.6 G/DL (ref 11.7–15.7)
HGB UR QL STRIP: ABNORMAL
KETONES UR STRIP-MCNC: NEGATIVE MG/DL
LEUKOCYTE ESTERASE UR QL STRIP: ABNORMAL
MCH RBC QN AUTO: 25.5 PG (ref 26.5–33)
MCHC RBC AUTO-ENTMCNC: 31.7 G/DL (ref 31.5–36.5)
MCV RBC AUTO: 80 FL (ref 78–100)
MUCOUS THREADS #/AREA URNS LPF: PRESENT /LPF
NITRATE UR QL: NEGATIVE
PH UR STRIP: 5.5 [PH] (ref 5–7)
PLATELET # BLD AUTO: 180 10E3/UL (ref 150–450)
POTASSIUM SERPL-SCNC: 3.9 MMOL/L (ref 3.4–5.3)
RBC # BLD AUTO: 4.94 10E6/UL (ref 3.8–5.2)
RBC URINE: 15 /HPF
RENAL TUB EPI: <1 /HPF (ref ?–1)
SODIUM SERPL-SCNC: 140 MMOL/L (ref 135–145)
SP GR UR STRIP: 1.02 (ref 1–1.03)
SQUAMOUS EPITHELIAL: 5 /HPF
TRANSITIONAL EPI: <1 /HPF
UROBILINOGEN UR STRIP-MCNC: NORMAL MG/DL
WBC # BLD AUTO: 6 10E3/UL (ref 4–11)
WBC URINE: 65 /HPF

## 2025-05-22 PROCEDURE — G0378 HOSPITAL OBSERVATION PER HR: HCPCS

## 2025-05-22 PROCEDURE — 250N000013 HC RX MED GY IP 250 OP 250 PS 637: Performed by: INTERNAL MEDICINE

## 2025-05-22 PROCEDURE — 81003 URINALYSIS AUTO W/O SCOPE: CPT | Performed by: PHYSICIAN ASSISTANT

## 2025-05-22 PROCEDURE — 36415 COLL VENOUS BLD VENIPUNCTURE: CPT | Performed by: STUDENT IN AN ORGANIZED HEALTH CARE EDUCATION/TRAINING PROGRAM

## 2025-05-22 PROCEDURE — 85027 COMPLETE CBC AUTOMATED: CPT | Performed by: STUDENT IN AN ORGANIZED HEALTH CARE EDUCATION/TRAINING PROGRAM

## 2025-05-22 PROCEDURE — 97535 SELF CARE MNGMENT TRAINING: CPT | Mod: GO

## 2025-05-22 PROCEDURE — 97530 THERAPEUTIC ACTIVITIES: CPT | Mod: GP | Performed by: PHYSICAL THERAPIST

## 2025-05-22 PROCEDURE — 82947 ASSAY GLUCOSE BLOOD QUANT: CPT | Performed by: STUDENT IN AN ORGANIZED HEALTH CARE EDUCATION/TRAINING PROGRAM

## 2025-05-22 PROCEDURE — 99232 SBSQ HOSP IP/OBS MODERATE 35: CPT | Performed by: STUDENT IN AN ORGANIZED HEALTH CARE EDUCATION/TRAINING PROGRAM

## 2025-05-22 PROCEDURE — 87086 URINE CULTURE/COLONY COUNT: CPT | Performed by: PHYSICIAN ASSISTANT

## 2025-05-22 PROCEDURE — 250N000013 HC RX MED GY IP 250 OP 250 PS 637

## 2025-05-22 RX ADMIN — LATANOPROST 1 DROP: 50 SOLUTION OPHTHALMIC at 21:11

## 2025-05-22 RX ADMIN — Medication 12.5 MG: at 09:52

## 2025-05-22 RX ADMIN — ATORVASTATIN CALCIUM 40 MG: 20 TABLET, FILM COATED ORAL at 19:51

## 2025-05-22 RX ADMIN — BRIMONIDINE TARTRATE 1 DROP: 2 SOLUTION/ DROPS OPHTHALMIC at 09:55

## 2025-05-22 RX ADMIN — ASPIRIN 81 MG: 81 TABLET ORAL at 09:52

## 2025-05-22 RX ADMIN — BRIMONIDINE TARTRATE 1 DROP: 2 SOLUTION/ DROPS OPHTHALMIC at 13:55

## 2025-05-22 RX ADMIN — BRIMONIDINE TARTRATE 1 DROP: 2 SOLUTION/ DROPS OPHTHALMIC at 19:52

## 2025-05-22 RX ADMIN — DORZOLAMIDE HYDROCHLORIDE AND TIMOLOL MALEATE 1 DROP: 20; 5 SOLUTION OPHTHALMIC at 19:51

## 2025-05-22 RX ADMIN — DORZOLAMIDE HYDROCHLORIDE AND TIMOLOL MALEATE 1 DROP: 20; 5 SOLUTION OPHTHALMIC at 09:54

## 2025-05-22 ASSESSMENT — ACTIVITIES OF DAILY LIVING (ADL)
ADLS_ACUITY_SCORE: 45
ADLS_ACUITY_SCORE: 49
ADLS_ACUITY_SCORE: 45
ADLS_ACUITY_SCORE: 49
ADLS_ACUITY_SCORE: 45
ADLS_ACUITY_SCORE: 49
ADLS_ACUITY_SCORE: 45
ADLS_ACUITY_SCORE: 49
ADLS_ACUITY_SCORE: 45
ADLS_ACUITY_SCORE: 49
ADLS_ACUITY_SCORE: 45

## 2025-05-22 NOTE — PROGRESS NOTES
Brief Social Work Note    Spoke with hospitalist. Patient maintains that she does not want to go to TCU. She reports that she wants to go to her sister's home. Her sister, Riddhi, lives in [place], [place]. Hospitalist spoke with Riddhi and Riddhi states that she would welcome patient to her home. Hospitalist told Riddhi to speak with Bushra and patient about the logistics of this and hospital staff can touch base with patient/family tomorrow with their decision. Bushra maintains that she would prefer that patient go to TCU but understands that it is patient's choice. Hospitalist will document Riddhi's phone number in progress note.    Next Steps:    - Follow up w/ Bushra, Riddhi, and pt for further discharge planning (most specifically their decision for TCU vs home vs sister's home)     - Reschedule appt to establish care w/ [facility]  PCP (required for home care and ideal for general post-hospitalization follow up) if patient is discharging to her home     - If patient opts to discharge to TCU:  Provide Medicare Care Compare List, collect preferences, and send referrals     - If patient opts to discharge to home:  Notify pt of accepting home care agency/services retained  Send message to the PCP that pt will be seeing to notify them of discharge plan and request that they provide orders/clinic visit documentation to [facility]  Send orders to [facility] at discharge (PT/OT/RN/SW/HHA; nursing indicated for post-hospitalization assessments, education, disease mgmt, and med mgmt)  Advise Bushra to coordinate w/ pt's other children to provide as much assistance to pt as possible    - If patient discharges to sister's home, will need to explore what services and supports can (if any) be established in [place].          Anusha Kelly, NATALYW, LSW  8 Med Surg   [facility]  Phone: [phone]  Vocera: Searchable by name or group: 8 Med Surg Vocera

## 2025-05-22 NOTE — PLAN OF CARE
Goal Outcome Evaluation:      Plan of Care Reviewed With: patient    Overall Patient Progress: no changeOverall Patient Progress: no change    Outcome Evaluation: delirium precatuions maintained, bed alarm on.    A&Ox4. CMS intact. Denies numbness/tingling, nausea,vomiting, SOB, and chest pain.  Up w/WGB A1. voiding adequate amounts. LBM: 5/20. Denies pain.  Using call light appropriately. Rested throughout the night no new complaints.  Disposition: Safe placement, OT recommending TCU, patient requesting to be discharged home.  Continue with POC.

## 2025-05-22 NOTE — PROGRESS NOTES
[facility]    Medicine Progress Note - Hospitalist Service, GOLD TEAM 22    Date of Admission:  5/20/2025    Assessment & Plan   Rolanda Molina is a 74 year old female admitted on 5/20/2025.  History notable for ventriculomegaly, glaucoma with blindness of right eye, CVA, hypertension, recurrent falls.  Admitted to medicine after recurrent falls at home. OT recommendation for TCU, patient declining. Will continue to work on safe dispo planning.     Plan today:   - Follow up urine culture   - PT/OT - appreciate MMSE to support patient decision making as she continues to decline TCU  - Discussed with Son, [facility], who does not have immediate plans to travel with the patient to [place] but tells me they have been talking about this as a potential option. He prefers I discuss with daughter, Bushra. I attempted to call her but was unable to get a hold of her. Will retry this afternoon.      #Recurrent falls  #Unsteadiness  #Intermittent confusion  #Right eye blindness  #Failure to thrive  History of multiple recurrent falls.  Recent admission 4/23 to 4/28 for similar concern.  Initially was resistant at the end of previous admission to pursue TCU care.  Was then readmitted day of discharge willing to pursue TCU, however ultimately did not qualify for TCU placement.  Presented to emergency department 5/20 due to mechanical fall with prolonged time on floor.  No significant trauma noted within emergency department.  Head/C-spine CT negative.  Possible TCU placement as recommended during previous admission.   - OT/PT consult  -Social work consult  - Up with assist  - Fall precautions/bed alarm  - Delirium precautions  - Patient declining TCU - consideration for MMSE per OT tomorrow. Will obtain collateral from family as well.      #Ventriculomegaly   #Possible normal pressure hydrocephalus versus ex-vacuo dilatation   History of Ventriculomegaly.  Has been assessed by [facility]  neurology during admissions 10/2024, 4/2025 for the concern of possible normal pressure hydrocephalus.  Noted in April that CT head showed stable stable mild to moderately dilated lateral and third ventricles with mild cerebral parenchymal volume loss.  Neurology was unable to assess gait. Unable to definitively determine if symptoms of NPH present.  Deferred LP at that time.  Recommended checking serologies with outpatient neurology follow-up.  Per chart review has not get followed up with neurology  - Neurology consulted, no additional recommendations this admission. Continue to recommend outpatient follow up.      #Hypertension  Upon arrival blood pressure 172/124. Improved to 150/101.  Reports did not take a.m. losartan  - Resume PTA losartan     #History of CVA  Found to have MRI evidence of multifocal strokes suggestive of embolic disease during confusion for workup for confusion insult 2024.  Did not follow-up with neurology and was not taking DAPT.  Started on aspirin during 4/2025 admission.  - Continue PTA aspirin  -PTA atrovastatin     #Thrombocytopenia , improved   - No clinical evidence of bleeding, no heparin given this admission.   - trend PLT, low threshold for peripheral smear if ongoing downtrend.     #Elevated serum ketones, improved  #NAGMA, improving   #Possible failure to thrive/possible poor p.o. intake  On admission noted to have serum ketones 0.94.  Very mild anion gap of 16. Reports adequate p.o. intake.   -IV fluid resuscitation  - Trend BMP   - Encourage p.o. intake  - Nutrition consult; calorie counts      #History of subacute angle-closure glaucoma  #Right eye blindness  - Continue PTA eyedrops          Diet: Combination Diet Regular Diet Adult    DVT Prophylaxis: Pneumatic Compression Devices  Ornelas Catheter: Not present  Lines: None     Cardiac Monitoring: None  Code Status: No CPR- Pre-arrest intubation OK      Clinically Significant Risk Factors Present on Admission          #  "Hyperchloremia: Highest Cl = 108 mmol/L in last 2 days, will monitor as appropriate            # Drug Induced Platelet Defect: home medication list includes an antiplatelet medication   # Hypertension: Noted on problem list           # Overweight: Estimated body mass index is 25.53 kg/m  as calculated from the following:    Height as of this encounter: 1.702 m (5' 7\").    Weight as of 4/23/25: 73.9 kg (163 lb).       # Financial/Environmental Concerns: none (per pt statement)         Social Drivers of Health    Tobacco Use: High Risk (5/20/2025)    Patient History     Smoking Tobacco Use: Every Day     Smokeless Tobacco Use: Never          Disposition Plan     Medically Ready for Discharge: Ready Now             Magi Aiken DO  Hospitalist Service, GOLD TEAM 22  [facility]  Securely message with [employer] (more info)  Text page via [facility] Paging/Directory   See signed in provider for up to date coverage information  ______________________________________________________________________    Interval History   No acute events overnight. She feels well and thinks she is at her baseline. She does note she did not sleep well and feels this is contributing to her ongoing unsteadiness. She denies LH, dizziness, chest pain, dyspnea or other concerns. Feels she is eating and drinking well.     Physical Exam   Vital Signs: Temp: 98.1  F (36.7  C) Temp src: Oral BP: (!) 151/98 Pulse: 90   Resp: 16 SpO2: 99 % O2 Device: None (Room air)    Weight: 0 lbs 0 oz    General: well developed, awake, alert, no acute distress  HEENT: sclera clear, MMM  CV: RRR, no m/r/g. Extremities are warm and well perfused.   LUNGS: CTAB, no w/r/c.  SKIN: Warm, well perfused. No skin rashes or abnormal lesions.  MSK: No deformities. No clubbing, cyanosis, or edema.  NEURO: Alert and oriented. No focal deficits.    Medical Decision Making           Data     I have personally reviewed the following data " over the past 24 hrs:    6.0  \   12.6   / 180     140 106 17.6 /  99   3.9 20 (L) 0.97 (H) \

## 2025-05-22 NOTE — UTILIZATION REVIEW
Concurrent stay review; Secondary Review Determination -         Under the authority of the Utilization Management Committee, the utilization review process indicated a secondary review on the above patient.  The review outcome is based on review of the medical records, discussions with staff, and applying clinical experience noted on the date of the review.        (x) Outpatient half-way status is appropriate       RATIONALE FOR DETERMINATION:     74 year old female with ventriculomegaly, glaucoma with blindness of right eye, CVA, hypertension, and recurrent falls who was admitted on 5/20/2025 after recurrent falls at home. OT recommendation for TCU, patient declining. At present she is medically stable, awaiting safe disposition.    Patient delayed discharge is related to disposition, there is no medical necessity for inpatient admission at the time of this review. If there is a change in patient status, please resend for review.    The information on this document is developed by the utilization review team in order for the business office to ensure compliance.  This only denotes the appropriateness of proper admission status and does not reflect the quality of care rendered.       The definitions of Inpatient Status and Observation Status used in making the determination above are those provided in the CMS Coverage Manual, Chapter 1 and Chapter 6, section 70.4.       Sincerely,    Rae Johnson MD  Physician Advisor  Utilization Management    
Yes

## 2025-05-23 ENCOUNTER — APPOINTMENT (OUTPATIENT)
Dept: PHYSICAL THERAPY | Facility: CLINIC | Age: 75
End: 2025-05-23
Payer: COMMERCIAL

## 2025-05-23 LAB — BACTERIA UR CULT: NORMAL

## 2025-05-23 PROCEDURE — 250N000013 HC RX MED GY IP 250 OP 250 PS 637

## 2025-05-23 PROCEDURE — 250N000013 HC RX MED GY IP 250 OP 250 PS 637: Performed by: INTERNAL MEDICINE

## 2025-05-23 PROCEDURE — G0378 HOSPITAL OBSERVATION PER HR: HCPCS

## 2025-05-23 PROCEDURE — 99232 SBSQ HOSP IP/OBS MODERATE 35: CPT | Performed by: STUDENT IN AN ORGANIZED HEALTH CARE EDUCATION/TRAINING PROGRAM

## 2025-05-23 PROCEDURE — 97530 THERAPEUTIC ACTIVITIES: CPT | Mod: GP | Performed by: PHYSICAL THERAPIST

## 2025-05-23 RX ADMIN — DORZOLAMIDE HYDROCHLORIDE AND TIMOLOL MALEATE 1 DROP: 20; 5 SOLUTION OPHTHALMIC at 19:59

## 2025-05-23 RX ADMIN — Medication 12.5 MG: at 08:36

## 2025-05-23 RX ADMIN — BRIMONIDINE TARTRATE 1 DROP: 2 SOLUTION/ DROPS OPHTHALMIC at 08:37

## 2025-05-23 RX ADMIN — ASPIRIN 81 MG: 81 TABLET ORAL at 08:36

## 2025-05-23 RX ADMIN — BRIMONIDINE TARTRATE 1 DROP: 2 SOLUTION/ DROPS OPHTHALMIC at 14:16

## 2025-05-23 RX ADMIN — LATANOPROST 1 DROP: 50 SOLUTION OPHTHALMIC at 21:47

## 2025-05-23 RX ADMIN — DORZOLAMIDE HYDROCHLORIDE AND TIMOLOL MALEATE 1 DROP: 20; 5 SOLUTION OPHTHALMIC at 08:37

## 2025-05-23 RX ADMIN — BRIMONIDINE TARTRATE 1 DROP: 2 SOLUTION/ DROPS OPHTHALMIC at 19:59

## 2025-05-23 RX ADMIN — ATORVASTATIN CALCIUM 40 MG: 20 TABLET, FILM COATED ORAL at 19:56

## 2025-05-23 ASSESSMENT — ACTIVITIES OF DAILY LIVING (ADL)
ADLS_ACUITY_SCORE: 49

## 2025-05-23 NOTE — PLAN OF CARE
Goal Outcome Evaluation:       Patient is A & O x 4; coherent of speech, and able to make her needs known to staff; receives scheduled medications; calm, and pleasant; denied SOB, pain, and discomfort; will continue with POC; call light is within reach.    Max Wu RN

## 2025-05-23 NOTE — PLAN OF CARE
"Goal Outcome Evaluation:      Plan of Care Reviewed With: patient    Overall Patient Progress: improvingOverall Patient Progress: improving           VS: /77 (BP Location: Left arm)   Pulse 80   Temp 97.5  F (36.4  C) (Oral)   Resp 16   Ht 1.702 m (5' 7\")   Wt 72.3 kg (159 lb 6.4 oz)   LMP  (LMP Unknown)   SpO2 99%   BMI 24.97 kg/m       O2: Stable @ RA Denies SOB,Chest pain   Output: Continent of b/b   Activity: AX1 with walker GB   Skin: Intact   Pain: Denies   CMS: AXOX4   Dressing: None   Diet: Regular diet,Tolerating well   LDA: L.PIV SL   Equipment: Personal belongings   Plan: Continue with POC   Additional Info: Urine sample sent to the lab  OT Completed  Pt requesting to be discharged home           "

## 2025-05-23 NOTE — PLAN OF CARE
Goal Outcome Evaluation: [id number]      Plan of Care Reviewed With: patient      Pt slept through the night, notified by nurse during report patient does not want to be bothered overnight and would like to sleep. Pt slept through the night, no full assessment completed. Pt is still asleep. Continue with POC

## 2025-05-23 NOTE — PLAN OF CARE
"Goal Outcome Evaluation:       Patient is A & O x 4; coherent of speech, and able to make her needs known to staff; A x 1 with FWW and gait belt to ambulate in the room; receives schedule medication; ate 100% of her breakfast, and lunch; calm, and pleasant; denied SOB, pain, and discomfort; will continue with POC; call light is within reach. /65 (BP Location: Left arm)   Pulse 73   Temp 98.3  F (36.8  C) (Oral)   Resp 16   Ht 1.702 m (5' 7\")   Wt 72.3 kg (159 lb 6.4 oz)   LMP  (LMP Unknown)   SpO2 99%   BMI 24.97 kg/m      Max Wu RN                       "

## 2025-05-23 NOTE — PROGRESS NOTES
[facility]    Medicine Progress Note - Hospitalist Service, GOLD TEAM 22    Date of Admission:  5/20/2025    Assessment & Plan   Rolanda Molina is a 74 year old female admitted on 5/20/2025.  History notable for ventriculomegaly, glaucoma with blindness of right eye, CVA, hypertension, recurrent falls.  Admitted to medicine after recurrent falls at home. OT recommendation for TCU, patient declining. Will continue to work on safe dispo planning.     Plan today:   - Follow up urine culture   - Patient now amenable to TCU vs home to stay with her sister (sister is in [place] so would need to ensure she has a plan to get there).     #Recurrent falls  #Unsteadiness  #Intermittent confusion  #Right eye blindness  #Failure to thrive  History of multiple recurrent falls.  Recent admission 4/23 to 4/28 for similar concern.  Initially was resistant at the end of previous admission to pursue TCU care.  Was then readmitted day of discharge willing to pursue TCU, however ultimately did not qualify for TCU placement.  Presented to emergency department 5/20 due to mechanical fall with prolonged time on floor.  No significant trauma noted within emergency department.  Head/C-spine CT negative.  Possible TCU placement as recommended during previous admission.   - OT/PT consult  -Social work consult  - Up with assist  - Fall precautions/bed alarm  - Delirium precautions  - Patient initially declining TCU. Patient amenable as of 5/23 discussed with  and will place referrals.   - Discussed neuropsych testing outpatient with the patient's daughter if she ultimately declines TCU and goes back home.      #Ventriculomegaly   #Possible normal pressure hydrocephalus versus ex-vacuo dilatation   History of Ventriculomegaly.  Has been assessed by [facility] neurology during admissions 10/2024, 4/2025 for the concern of possible normal pressure hydrocephalus.  Noted in April that CT head showed  stable stable mild to moderately dilated lateral and third ventricles with mild cerebral parenchymal volume loss.  Neurology was unable to assess gait. Unable to definitively determine if symptoms of NPH present.  Deferred LP at that time.  Recommended checking serologies with outpatient neurology follow-up.  Per chart review has not get followed up with neurology  - Neurology consulted, no additional recommendations this admission. Continue to recommend outpatient follow up.      #Hypertension  Upon arrival blood pressure 172/124. Improved to 150/101.  Reports did not take a.m. losartan  - Resume PTA losartan     #History of CVA  Found to have MRI evidence of multifocal strokes suggestive of embolic disease during confusion for workup for confusion insult 2024.  Did not follow-up with neurology and was not taking DAPT.  Started on aspirin during 4/2025 admission.  - Continue PTA aspirin  -PTA atrovastatin     #Thrombocytopenia , improved   - No clinical evidence of bleeding, no heparin given this admission.   - trend PLT, low threshold for peripheral smear if ongoing downtrend.     #Elevated serum ketones, improved  #NAGMA, improving   #Possible failure to thrive/possible poor p.o. intake  On admission noted to have serum ketones 0.94.  Very mild anion gap of 16. Reports adequate p.o. intake.   -IV fluid resuscitation  - Trend BMP   - Encourage p.o. intake  - Nutrition consult; calorie counts      #History of subacute angle-closure glaucoma  #Right eye blindness  - Continue PTA eyedrops          Diet: Combination Diet Regular Diet Adult  Calorie Counts    DVT Prophylaxis: Pneumatic Compression Devices  Ornelas Catheter: Not present  Lines: None     Cardiac Monitoring: None  Code Status: No CPR- Pre-arrest intubation OK      Clinically Significant Risk Factors Present on Admission                 # Drug Induced Platelet Defect: home medication list includes an antiplatelet medication   # Hypertension: Noted on problem  list               # Financial/Environmental Concerns: none (per pt statement)         Social Drivers of Health    Tobacco Use: High Risk (5/20/2025)    Patient History     Smoking Tobacco Use: Every Day     Smokeless Tobacco Use: Never          Disposition Plan     Medically Ready for Discharge: Ready Now             Magi Aiken DO  Hospitalist Service, GOLD TEAM 22  [facility]  Securely message with [employer] (more info)  Text page via [facility] Paging/Directory   See signed in provider for up to date coverage information  ______________________________________________________________________    Interval History   No acute events overnight. She continues to feel well. No concerns.     Physical Exam   Vital Signs: Temp: 98.5  F (36.9  C) Temp src: Oral BP: 103/68 Pulse: 75   Resp: 16 SpO2: 100 % O2 Device: None (Room air)    Weight: 159 lbs 6.4 oz    General: well developed, awake, alert, no acute distress  HEENT: sclera clear, MMM  CV: RRR, no m/r/g. Extremities are warm and well perfused.   LUNGS: CTAB, no w/r/c.  SKIN: Warm, well perfused. No skin rashes or abnormal lesions.  MSK: No deformities. No clubbing, cyanosis, or edema.  NEURO: Alert and oriented. No focal deficits.    Medical Decision Making           Data

## 2025-05-23 NOTE — PROGRESS NOTES
"Care Management Follow Up    Length of Stay (days): 1    Expected Discharge Date: 05/24/2025     Concerns to be Addressed: discharge planning, home safety     Patient plan of care discussed at interdisciplinary rounds: Yes    Anticipated Discharge Disposition:  (TCU vs Home)     Anticipated Discharge Services:  (TCU services vs [facility] PT/OT/RN/SW/HHA)  Anticipated Discharge DME:  (Has some DME items at home (see CMA note); pt requested raised toilet seat)    Patient/family educated on Medicare website which has current facility and service quality ratings: Yes  Education Provided on the Discharge Plan: Yes  Patient/Family in Agreement with the Plan: Patient semi-committal to TCU placement, stating that she will go \"depending on where [she] get's in\"    Referrals Placed by CM/SW: Post Acute Facilities    Pending:     [facility]  [facility] - Stella Peace  P: [phone]  F: [phone]  Email: [email or web address]  5/23: Initial referral sent    [facility]  5/23: Initial referral sent    [facility]  [address] [id number] [phone]  5/23: Initial referral sent    [facility] 4th floor  [address] [place] [address]  [phone]  Admissions: [phone]  Fax: [phone]  RN to RN:  [phone]  5/23: Initial referral sent    [facility]  [address] [place] [address]  [phone]  5/23: Initial referral sent    [facility]  [address] [place], [place] [id number] [phone]  5/23: Initial referral sent    [facility]  [address] [place] [address]  Phone (Main): [phone]  Admissions: [phone]  5/23: Initial referral sent    [facility]  [address] [place] [address]  [phone]  5/23: Initial referral sent    [facility]  [address] [place] " [address]  Main: [phone]  Admissions: [phone]  5/23: Initial referral sent    [facility]   [address] [place] [address]  [phone]  5/23: Initial referral sent    [facility]  [address] [place] [address]  [phone]  5/23: Initial referral sent    [facility]  [address] [place] [address]  [phone]  5/23: Initial referral sent    [facility]  [address] [place]  [address]  P: [phone]  F: [phone]  5/23: Initial referral sent    [facility]  [address] [place] [address]  [phone]  Admissions:  [phone]  5/23: Initial referral sent    [facility]  [address] [place] [place] [address]  [phone]  5/23: Initial referral sent    [facility]  [address] [place], [place] [address] [phone]  5/23: Initial referral sent    [facility]  [address] [place] [address]  [phone]  5/23: Initial referral sent    [facility]   [address] [place] [address]  [phone]  5/23: Initial referral sent    [facility]  [address] [place] [address]  [phone]  5/23: Initial referral sent    [facility]  [address] [place] [address]  [phone];  Admissions:  [phone]   5/23: Initial referral sent    [facility]  [address] [place] [address]  [phone]  5/23: Initial referral sent    [facility]  [address] [place] [address]  [phone]  5/23: Initial referral sent    [facility] [address] [place] [address]  [phone]  5/23: Initial referral sent    Private pay costs  discussed: Not applicable    Discussed  Partnership in Safe Discharge Planning  document with patient/family: Yes    Handoff Completed: No, handoff not indicated or clinically appropriate    Additional Information:    Per hospitalist, patient is medically ready for discharge. Patient now tells hospitalist that she would be willing to go to TCU.     SW met with patient at bedside. Patient tells SW that she would be willing to go to TCU depending on the facility. SW stated that because she has been medically ready for discharge for some time, we have to send referrals to facilities that SW believes might be able to meet her needs. Discussed that SW will start with facilities closest to her home and expand from there. Patient in agreement. SW explained that patient must discharge to first accepting facility, patient verbalized understanding. She tells SW that if she doesn't like the facility she is accepted to, she will just go home or to one of her children's house. SW stated that she will need to speak with her children about this possibility. It is important to note that both children have told care management staff that patient going to their home is not an option.     SW initiated referrals (see above).    Will ask weekend/holiday  to follow up on referrals to see if placement can be found.    Next Steps:     - Follow up with TCU referrals and send additional if needed    Once placement is found:  - Complete PAS  - Discuss transportation options (including potential OOP costs) and schedule if needed     On day of discharge:  - Send orders to facility          NATALY HairstonW, LSW  8 Med Surg   [facility]  Phone: [phone]  Vocera: Searchable by name or group: 8 Med Surg Vocjane

## 2025-05-24 ENCOUNTER — APPOINTMENT (OUTPATIENT)
Dept: PHYSICAL THERAPY | Facility: CLINIC | Age: 75
End: 2025-05-24
Payer: COMMERCIAL

## 2025-05-24 PROCEDURE — 101N000002 HC CUSTODIAL CARE DAILY

## 2025-05-24 PROCEDURE — G0378 HOSPITAL OBSERVATION PER HR: HCPCS

## 2025-05-24 PROCEDURE — 97530 THERAPEUTIC ACTIVITIES: CPT | Mod: GP | Performed by: PHYSICAL THERAPIST

## 2025-05-24 PROCEDURE — 250N000013 HC RX MED GY IP 250 OP 250 PS 637

## 2025-05-24 PROCEDURE — 250N000013 HC RX MED GY IP 250 OP 250 PS 637: Performed by: INTERNAL MEDICINE

## 2025-05-24 RX ADMIN — BRIMONIDINE TARTRATE 1 DROP: 2 SOLUTION/ DROPS OPHTHALMIC at 08:16

## 2025-05-24 RX ADMIN — ASPIRIN 81 MG: 81 TABLET ORAL at 08:15

## 2025-05-24 RX ADMIN — ATORVASTATIN CALCIUM 40 MG: 20 TABLET, FILM COATED ORAL at 20:04

## 2025-05-24 RX ADMIN — DORZOLAMIDE HYDROCHLORIDE AND TIMOLOL MALEATE 1 DROP: 20; 5 SOLUTION OPHTHALMIC at 08:16

## 2025-05-24 RX ADMIN — BRIMONIDINE TARTRATE 1 DROP: 2 SOLUTION/ DROPS OPHTHALMIC at 14:14

## 2025-05-24 RX ADMIN — Medication 12.5 MG: at 08:15

## 2025-05-24 RX ADMIN — BRIMONIDINE TARTRATE 1 DROP: 2 SOLUTION/ DROPS OPHTHALMIC at 20:05

## 2025-05-24 RX ADMIN — LATANOPROST 1 DROP: 50 SOLUTION OPHTHALMIC at 22:01

## 2025-05-24 RX ADMIN — DORZOLAMIDE HYDROCHLORIDE AND TIMOLOL MALEATE 1 DROP: 20; 5 SOLUTION OPHTHALMIC at 20:05

## 2025-05-24 ASSESSMENT — ACTIVITIES OF DAILY LIVING (ADL)
ADLS_ACUITY_SCORE: 46
ADLS_ACUITY_SCORE: 49
ADLS_ACUITY_SCORE: 49
ADLS_ACUITY_SCORE: 46
ADLS_ACUITY_SCORE: 49
ADLS_ACUITY_SCORE: 46
ADLS_ACUITY_SCORE: 49
ADLS_ACUITY_SCORE: 46
ADLS_ACUITY_SCORE: 46
ADLS_ACUITY_SCORE: 49
ADLS_ACUITY_SCORE: 46
ADLS_ACUITY_SCORE: 49
ADLS_ACUITY_SCORE: 46

## 2025-05-24 NOTE — PLAN OF CARE
Goal Outcome Evaluation: [id number]      Plan of Care Reviewed With: patient    Overall Patient Progress: improvingOverall Patient Progress: improving     Pt awake and alert start of shift. Pt denied pain. Pt took all her meds eye drops and continued to watch TV. Pt noted that she does not usually see with her right eye but sees good with her left eye.     Pt currently awake, completing her morning cares independently. Continue with POC.

## 2025-05-24 NOTE — CONSULTS
Care Management Follow Up    Length of Stay (days): 1    Expected Discharge Date: 05/25/2025     Concerns to be Addressed: discharge planning     Patient plan of care discussed at interdisciplinary rounds: Yes    Anticipated Discharge Disposition: Transitional Care              Anticipated Discharge Services: None  Anticipated Discharge DME: None    Patient/family educated on Medicare website which has current facility and service quality ratings: yes  Education Provided on the Discharge Plan: Yes  Patient/Family in Agreement with the Plan: yes    Referrals Placed by CM/SW: Post Acute Facilities    Accepted:    [facility]  [address] [place], [place] [address] [phone]  5/24: Can take pt tomorrow 5/25, called admissions to confirm.    Considering:    [facility]  Patricia Mcgovern, Admission Liaison   P: [phone]  F: [phone]   E: Patricia Mcgovern, Admission Liaison   5/24 considering    [facility]  [address] [place] [address]00  P: [phone]  Adm P: [phone]  F: [phone]  5/24 considering    Pending:      [facility] [place]  [place] Liaison - Stella Peace  P: [phone]  F: [phone]  Email: [email or web address]  5/23: Initial referral sent     [facility]  [address] [address]  [phone]  5/23: Initial referral sent     [facility]  [address] [place] [address]  [phone]  5/23: Initial referral sent     [facility]  [address] [place] [address]  Phone (Main): [phone]  Admissions: [phone]  5/23: Initial referral sent     [facility]  [address] [place] [address] [phone]  5/23: Initial referral sent     [facility]  [address] [place] [address]  Main: [phone]  Admissions: [phone]  5/23: Initial referral  sent     [facility]   [address]  [place] [address]  [phone]  5/23: Initial referral sent     [facility]  [address] [place]  [address]  P: [phone]  F: [phone]  5/23: Initial referral sent     [facility]  [address] [place] [address]  [phone]  Admissions:  [phone]  5/23: Initial referral sent     [facility]  [address] [place] [address]  [phone]  5/23: Initial referral sent     [facility]  [address] [place] [address]  [phone]  5/23: Initial referral sent     [facility]  [address] [place] [address]  [phone]  5/23: Initial referral sent     [facility]   [address] [place] [address]  [phone]  5/23: Initial referral sent     [facility]  [address] [place] [address]  [phone]  5/23: Initial referral sent     [facility]  [address] [place] [address]  [phone]  5/23: Initial referral sent      Declined:  [facility]  [address] [place]  [address]  P: [phone]  P: [phone] - Admissions  F: [phone]  5/24 insurance not accepted    [facility]  [address] [place] [address]  P: [phone]  Adm P: [phone]  Adm F: [phone]  5/24 no bed available    [facility]  [address] [place] [address]  P: [phone]  Adm P: [phone]  Adm F: [phone]  5/24 no bed available     [facility]  [address] [place]  [address]  P: [phone]  P: [phone] - Admissions  F: [phone]  5/23 no bed available    [facility] 4th floor  [address] [place]  [address]  Admissions:  [phone]  Fax: [phone]  RN to RN:  [phone]  5/24: needs can be met at a community TCU      Private pay costs discussed: Not applicable    Discussed  Partnership in Safe Discharge Planning  document with patient/family: No     Handoff Completed: No, handoff not indicated or clinically appropriate    Additional Information:  Writer [facility] with [facility] and confirmed that this pt can admit to their facility tomorrow 5/25. Admissions will not be in until 10:30 AM tomorrow. Writer called pt's daughter Bushra and confirmed she can provide transportation tomorrow.     Next Steps: SW follow up for TCU planning.       KELSEA Vega  5/24/2025       Social Work and Care Management Department       SEARCHABLE in [facility] - search SOCIAL WORK       [place] (0800 - 1630) Saturday and Sunday     Units: 4A Vocera, 4C Vocera, & 4E Vocera        Units: 5A 3689-5969 Vocera, 5A 3903-1406 Vocera , BMT SW 1 BMT SW 2, BMT SW 3 & BMT SW 4  5C Off Service 5401 - 5416  5C Off Service [phone]     Units: 6A Vocera & 6B Vocera      Units: 6C Vocera     Units: 7A Vocera & 7B Vocera      Units: 7C Med Surg 7401 thru 7418 and 7C Med Surg 7502 thru 7521      Unit: [place] ED Vocera & [place] Obs Vocera     [facility] ([phone]) Saturday and Sunday      Units: 5 Ortho Vocera, 5 Med Surg Vocera & WB ED Vocera     Units: 6 Med Surg Vocera, 8 Med Surg Vocera, & 10 ICU Vocera      After hours Vocera [facility] and After Hours Vocera [place]     Please NOTE changes to times below:    **Saturday & Sunday (1630 - 2030)    **Mon-Fri (6124-5728)     **[facility] Recognized Holidays  (4341-6347)    Units: ALL   - see above VOCERA links to units

## 2025-05-24 NOTE — PROGRESS NOTES
[facility]    Medicine Progress Note - Hospitalist Service, GOLD TEAM 22    Date of Admission:  5/20/2025    Assessment & Plan   Rolanda Molina is a 74 year old female admitted on 5/20/2025.  History notable for ventriculomegaly, glaucoma with blindness of right eye, CVA, hypertension, recurrent falls.  Admitted to medicine after recurrent falls at home. OT recommendation for TCU, patient declining. Will continue to work on safe dispo planning.     Plan today:   - Patient now amenable to TCU vs home to stay with her sister (sister is in [place] so would need to ensure she has a plan to get there).   - Urine culture with mixed urogenital christian  - Transitioned to [facility] on 5/24    #Recurrent falls  #Unsteadiness  #Intermittent confusion  #Right eye blindness  #Failure to thrive  History of multiple recurrent falls.  Recent admission 4/23 to 4/28 for similar concern.  Initially was resistant at the end of previous admission to pursue TCU care.  Was then readmitted day of discharge willing to pursue TCU, however ultimately did not qualify for TCU placement.  Presented to emergency department 5/20 due to mechanical fall with prolonged time on floor.  No significant trauma noted within emergency department.  Head/C-spine CT negative.  Possible TCU placement as recommended during previous admission.   - OT/PT consult  -Social work consult  - Up with assist  - Fall precautions/bed alarm  - Delirium precautions  - Patient initially declining TCU. Patient amenable as of 5/23 discussed with JEFF[facility] and will place referrals.   - Discussed neuropsych testing outpatient with the patient's daughter if she ultimately declines TCU and goes back home.      #Ventriculomegaly   #Possible normal pressure hydrocephalus versus ex-vacuo dilatation   History of Ventriculomegaly.  Has been assessed by [facility] neurology during admissions 10/2024, 4/2025 for the concern of possible normal  pressure hydrocephalus.  Noted in April that CT head showed stable stable mild to moderately dilated lateral and third ventricles with mild cerebral parenchymal volume loss.  Neurology was unable to assess gait. Unable to definitively determine if symptoms of NPH present.  Deferred LP at that time.  Recommended checking serologies with outpatient neurology follow-up.  Per chart review has not get followed up with neurology  - Neurology consulted, no additional recommendations this admission. Continue to recommend outpatient follow up.      #Hypertension  Upon arrival blood pressure 172/124. Improved to 150/101.  Reports did not take a.m. losartan  - Resume PTA losartan     #History of CVA  Found to have MRI evidence of multifocal strokes suggestive of embolic disease during confusion for workup for confusion insult 2024.  Did not follow-up with neurology and was not taking DAPT.  Started on aspirin during 4/2025 admission.  - Continue PTA aspirin  -PTA atrovastatin     #Thrombocytopenia , improved   - No clinical evidence of bleeding, no heparin given this admission.   - trend PLT, low threshold for peripheral smear if ongoing downtrend.     #Elevated serum ketones, improved  #NAGMA, improving   #Possible failure to thrive/possible poor p.o. intake  On admission noted to have serum ketones 0.94.  Very mild anion gap of 16. Reports adequate p.o. intake.   -IV fluid resuscitation  - Trend BMP   - Encourage p.o. intake  - Nutrition consult; calorie counts      #History of subacute angle-closure glaucoma  #Right eye blindness  - Continue PTA eyedrops          Diet: Combination Diet Regular Diet Adult  Diet  Calorie Counts    DVT Prophylaxis: Pneumatic Compression Devices  [place]nelas Catheter: Not present  Lines: None     Cardiac Monitoring: None  Code Status: No CPR- Pre-arrest intubation OK      Clinically Significant Risk Factors Present on Admission                 # Drug Induced Platelet Defect: home medication list  includes an antiplatelet medication   # Hypertension: Noted on problem list               # Financial/Environmental Concerns: none (per pt statement)         Social Drivers of Health    Tobacco Use: High Risk (5/20/2025)    Patient History     Smoking Tobacco Use: Every Day     Smokeless Tobacco Use: Never          Disposition Plan     Medically Ready for Discharge: Ready Now             Magi Aiken DO  Hospitalist Service, GOLD TEAM 22  [facility]  Securely message with [employer] (more info)  Text page via [facility] Paging/Directory   See signed in provider for up to date coverage information  ______________________________________________________________________    Interval History   No acute events overnight. She feels well, no concerns.     Physical Exam   Vital Signs: Temp: 97.8  F (36.6  C) Temp src: Oral BP: 104/68 Pulse: 75   Resp: 16 SpO2: 100 % O2 Device: None (Room air)    Weight: 159 lbs 6.4 oz    General: well developed, awake, alert, no acute distress  HEENT: sclera clear, MMM  CV: RRR, no m/r/g. Extremities are warm and well perfused.   LUNGS: CTAB, no w/r/c.  SKIN: Warm, well perfused. No skin rashes or abnormal lesions.  MSK: No deformities. No clubbing, cyanosis, or edema.  NEURO: Alert and oriented. No focal deficits.    Medical Decision Making           Data

## 2025-05-24 NOTE — PLAN OF CARE
"Goal Outcome Evaluation:       Patient is A & O x 4; coherent of speech, and able to make her needs known to staff; A x 1 with ambulating in the room; ambulated in the unit hallway with PT this shift using FWW; receives scheduled medications, and eye drops; stays in bed for most time during this shift, watching TV.  Denied SOB, pain, and discomfort. Will continue with POC; ca;ll light is within. /85 (BP Location: Left arm)   Pulse 71   Temp 97.5  F (36.4  C) (Oral)   Resp 16   Ht 1.702 m (5' 7\")   Wt 74.7 kg (164 lb 10.9 oz)   LMP  (LMP Unknown)   SpO2 100%   BMI 25.79 kg/m      Max Wu RN                       "

## 2025-05-25 VITALS
HEART RATE: 72 BPM | OXYGEN SATURATION: 100 % | WEIGHT: 164.68 LBS | TEMPERATURE: 98.2 F | BODY MASS INDEX: 25.85 KG/M2 | DIASTOLIC BLOOD PRESSURE: 97 MMHG | SYSTOLIC BLOOD PRESSURE: 150 MMHG | RESPIRATION RATE: 18 BRPM | HEIGHT: 67 IN

## 2025-05-25 PROCEDURE — 250N000013 HC RX MED GY IP 250 OP 250 PS 637

## 2025-05-25 PROCEDURE — G0378 HOSPITAL OBSERVATION PER HR: HCPCS

## 2025-05-25 PROCEDURE — 250N000013 HC RX MED GY IP 250 OP 250 PS 637: Performed by: INTERNAL MEDICINE

## 2025-05-25 RX ADMIN — Medication 12.5 MG: at 08:04

## 2025-05-25 RX ADMIN — ASPIRIN 81 MG: 81 TABLET ORAL at 08:04

## 2025-05-25 RX ADMIN — DORZOLAMIDE HYDROCHLORIDE AND TIMOLOL MALEATE 1 DROP: 20; 5 SOLUTION OPHTHALMIC at 08:04

## 2025-05-25 RX ADMIN — BRIMONIDINE TARTRATE 1 DROP: 2 SOLUTION/ DROPS OPHTHALMIC at 08:05

## 2025-05-25 ASSESSMENT — ACTIVITIES OF DAILY LIVING (ADL)
ADLS_ACUITY_SCORE: 46
ADLS_ACUITY_SCORE: 46
ADLS_ACUITY_SCORE: 42
ADLS_ACUITY_SCORE: 46
ADLS_ACUITY_SCORE: 42
ADLS_ACUITY_SCORE: 46
ADLS_ACUITY_SCORE: 42
ADLS_ACUITY_SCORE: 46
ADLS_ACUITY_SCORE: 46
ADLS_ACUITY_SCORE: 42
ADLS_ACUITY_SCORE: 42

## 2025-05-25 NOTE — DISCHARGE SUMMARY
DISCHARGE SUMMARY    Pt discharging to: nursing home  Transportation: family provided  AVS given and discussed: Yes, no further questions.   Medications given: Yes, discussed. No further questions.   Belongings returned: Yes, ensured all belongings packed and sent with pt. No items in security.   Comments: Escorted safely to elevators.     Max Wu RN

## 2025-05-25 NOTE — PLAN OF CARE
Occupational Therapy Discharge Summary    Reason for therapy discharge:    Discharged to transitional care facility.    Progress towards therapy goal(s). See goals on Care Plan in [facility] electronic health record for goal details.  Goals partially met.  Barriers to achieving goals:   discharge from facility.    Therapy recommendation(s):    Continued therapy is recommended.  Rationale/Recommendations:  to maximize safety and I with ADL/IADL. Pt lives alone, reporting difficulty managing all ADL/IADL, may need to consider increased support at home.

## 2025-05-25 NOTE — PROGRESS NOTES
Care Management Discharge Note    Discharge Date: 05/25/2025       Discharge Disposition: Transitional Care    [facility]  [address] [place] [address]  P: [phone]  P: [phone] - [facility]  F: [phone]    Discharge Services: None    Discharge DME: None    Discharge Transportation:     Krishan Tomlinson to provide transportation around 1:30 PM    Private pay costs discussed: Not applicable    Does the patient's insurance plan have a 3 day qualifying hospital stay waiver?  No    PAS Confirmation Code: [id number]  Patient/family educated on Medicare website which has current facility and service quality ratings: yes    Education Provided on the Discharge Plan: Yes  Persons Notified of Discharge Plans: Provider, bed side nurse, patient, patient [facility], TCU    Patient/Family in Agreement with the Plan: yes    Handoff Referral Completed: No, handoff not indicated or clinically appropriate    Additional Information:  Writer spoke with [facility] Admissions and confirmed the pt can discharge to their TCU today. Writer sent discharge orders. Writer met with pt at bedside who is in agreement of the discharge plan. Writer presented the IMM which pt asked writer to sing on behalf of her as she had no concerns with the discharge plan. Writer scanned the signed IMM to HIM and placed it in the hard chart. Writer completed the PAS [id number] with no concerns noted. Writer called pt krishan Tomlinson and confirmed she will be giving the pt a ride around 1:30 PM.      KELSEA Vega  5/25/2025       Social Work and Care Management Department       SEARCHABLE in [facility] - search SOCIAL WORK       [place] (3024 [phone] 8209) Saturday and Sunday     Units: 4A Vocera, 4C Vocera, & 4E Vocera        Units: 5A 4164-4246 Vocera, 5A 5944-5503 Vocera , BMT SW 1 BMT SW 2, BMT SW 3 & BMT SW 4  5C Off Service 7271 - 9313  5C Off Service 3467-4924     Units: 6A Vocera & 6B Vocera       Units: 6C Vocera     Units: 7A Vocera & 7B Vocera      Units: 7C Med Surg 7401 thru 7418 and 7C Med Surg 7502 thru 7521      Unit: [place] ED Vocera & [place] Obs Vocera     [facility] ([phone]) Saturday and Sunday      Units: 5 Ortho Vocera, 5 Med Surg Vocera & WB ED Vocera     Units: 6 Med Surg Vocera, 8 Med Surg Vocera, & 10 ICU Vocera      After hours Vocera [facility] and After Hours Vocera [place]     Please NOTE changes to times below:    **Saturday & Sunday (1630 - 2030)    **Mon-Fri (4065-2108)     **[facility] Recognized Holidays  (2108-8266)    Units: ALL   - see above VOCERA links to units

## 2025-05-25 NOTE — PLAN OF CARE
"Goal Outcome Evaluation:      VS: /86 (BP Location: Left arm, Patient Position: Supine, Cuff Size: Adult Regular)   Pulse 75   Temp 98.2  F (36.8  C) (Oral)   Resp 18   Ht 1.702 m (5' 7\")   Wt 74.7 kg (164 lb 10.9 oz)   LMP  (LMP Unknown)   SpO2 100%   BMI 25.79 kg/m       O2: Stable ORA    Output: Voids without difficulties   Last BM: 5/24/25   Activity: Assist x1   Up for meals? Not this shift    Skin: Intact    Pain: Denies   CMS: AO x4   Dressing: None   Diet: Regular   LDA: None   Equipment: None   Plan: Possible discharge to TCU 5/25/25   Additional Info:              "

## 2025-05-25 NOTE — PLAN OF CARE
Physical Therapy Discharge Summary    Reason for therapy discharge:    Discharged to transitional care facility.    Progress towards therapy goal(s). See goals on Care Plan in [facility] electronic health record for goal details.  Goals partially met.  Barriers to achieving goals:   discharge from facility.    Therapy recommendation(s):    Continued therapy is recommended.  Rationale/Recommendations:  Pt would benefit from further PT to increase independence with all mobility.

## 2025-05-25 NOTE — DISCHARGE SUMMARY
"[facility]  Hospitalist Discharge Summary      Date of Admission:  5/20/2025  Date of Discharge:  5/25/2025  Discharging Provider: Magi Aiken DO  Discharge Service: Hospitalist Service, GOLD TEAM 22    Clinically Significant Risk Factors     # Overweight: Estimated body mass index is 25.79 kg/m  as calculated from the following:    Height as of this encounter: 1.702 m (5' 7\").    Weight as of this encounter: 74.7 kg (164 lb 10.9 oz).       Follow-ups Needed After Discharge   Follow-up Appointments       Hospital to Primary Care - Establish PCP Referral      Please be aware that coverage of these services is subject to the terms and limitations of your health insurance plan.  Call member services at your health plan with any benefit or coverage questions.    Schedule Primary Care visit within: 7 Days           - Neurology follow up recommended - outpatient referral placed.   - Neuropsychiatry follow up - outpatient referral placed.     Discharge Disposition   Discharged to rehabilitation facility  Condition at discharge: Stable    Hospital Course   Rolanda Molina is a 74 year old female admitted on 5/20/2025.  History notable for ventriculomegaly, glaucoma with blindness of right eye, CVA, hypertension, recurrent falls.  Admitted to medicine after recurrent falls at home. PT/OT recommendation for TCU, patient initially declined with preference to go to stay with her sister, Riddhi. However, Riddhi lives in [place] and there was no immediate plan to get her there. In light of this and after conversation with the patient's daughter, she is now amenable to TCU with goal for return home.     Neurology evaluated her here given prior question of NPH. [place] she has multiple reasons for her unsteadiness and falls including impaired vision, cerebrovascular disease with prior strokes, and question peripheral neuropathy. They do not feel  shunt would helpful and recommend " ongoing PT/OT for ongoing multifactorial gait disorder. I have placed outpatient neurology referral per recommendation as well as neuropsychiatric testing.     #Recurrent falls  #Unsteadiness  #Intermittent confusion  #Right eye blindness  #Failure to thrive  History of multiple recurrent falls.  Recent admission 4/23 to 4/28 for similar concern.  Initially was resistant at the end of previous admission to pursue TCU care.  Was then readmitted day of discharge willing to pursue TCU, however ultimately did not qualify for TCU placement.  Presented to emergency department 5/20 due to mechanical fall with prolonged time on floor.  No significant trauma noted within emergency department.  Head/C-spine CT negative. Neurology consulted with no plan for further workup or management. They do not feel  shunt would be helpful at this time but recommend outpatient follow up. No other acutely reversible process as contributing to falls or weakness identified. She is taking PO and keeping up calorically here. Urine culture negative. She remains hemodynamically stable for discharge.   - Discharge to TCU   - Continue PT/OT  - Up with assist, fall precautions     #Ventriculomegaly   #Possible normal pressure hydrocephalus versus ex-vacuo dilatation   History of Ventriculomegaly.  Has been assessed by [facility] neurology during admissions 10/2024, 4/2025 for the concern of possible normal pressure hydrocephalus.  Noted in April that CT head showed stable stable mild to moderately dilated lateral and third ventricles with mild cerebral parenchymal volume loss.  Neurology was unable to assess gait. Unable to definitively determine if symptoms of NPH present.  Deferred LP at that time.  Recommended checking serologies with outpatient neurology follow-up. She has not yet been able to see neurology outpatient.   - Neurology consulted, no additional recommendations this admission. Continue to recommend outpatient follow up. They do not feel   shunt would provide benefit.   - Outpatient neurology referral placed - hopeful she will be able to make this appointment if she is in TCU.   - Neuropsychiatry referral placed      #Hypertension  Upon arrival blood pressure 172/124. Improved to 150/101.  Reports did not take a.m. losartan  - Continue PTA losartan     #History of CVA  Found to have MRI evidence of multifocal strokes suggestive of embolic disease during confusion for workup for confusion insult 2024.  Did not follow-up with neurology and was not taking DAPT.  Started on aspirin during 4/2025 admission.  - Continue PTA aspirin  -PTA atrovastatin  - Neuropsych testing per above.      #Thrombocytopenia , improved   - No clinical evidence of bleeding, no heparin given this admission. PLT improved.     #Elevated serum ketones, improved  #NAGMA, improved   #Possible failure to thrive/possible poor p.o. intake  On admission noted to have serum ketones 0.94.  Very mild anion gap of 16. Reports adequate p.o. intake. She is taking orals well here, nutrition with no concern for malnutrition inpatient. Possible question of food access in her current living environment.   - S/p IV fluid resuscitation  - Encourage p.o. intake     #History of subacute angle-closure glaucoma  #Right eye blindness  - Continue PTA eyedrops    Consultations This Hospital Stay   NEUROLOGY GENERAL ADULT IP CONSULT  OCCUPATIONAL THERAPY ADULT IP CONSULT  PHYSICAL THERAPY ADULT IP CONSULT  SOCIAL WORK IP CONSULT  CARE MANAGEMENT / SOCIAL WORK IP CONSULT  NUTRITION SERVICES ADULT IP CONSULT  CARE MANAGEMENT / SOCIAL WORK IP CONSULT  PHARMACY IP CONSULT    Code Status   No CPR- Pre-arrest intubation OK    Time Spent on this Encounter   I, Magi Aiken DO, personally saw the patient today and spent greater than 30 minutes discharging this patient.       Magi Aiken DO  [facility] [address]  [address] [place] [id number]  Phone: [phone]  Fax:  [phone]  ______________________________________________________________________    Physical Exam   Vital Signs: Temp: 98.2  F (36.8  C) Temp src: Oral BP: 129/86 Pulse: 75   Resp: 18 SpO2: 100 % O2 Device: None (Room air)    Weight: 164 lbs 10.94 oz  General: standing upright at the bedside with walker, no acute distress.   HEENT: sclera clear, MMM  CV: RRR, no m/r/g. Extremities are warm and well perfused.   LUNGS: CTAB, no w/r/c.  SKIN: Warm, well perfused. No skin rashes or abnormal lesions.  MSK: No deformities. No clubbing, cyanosis, or edema.  NEURO: Alert and appropriate. No focal deficits.       Primary Care Physician   Physician No Ref-Primary    Discharge Orders      Home Care Referral      Adult Neuropsychology [facility] to Primary Care - Establish PCP Referral      Reason for your hospital stay    You were in the hospital after a fall without injury but with difficultly getting up independently. Neurology saw you here and have no further workup recommended while here but recommend you follow up with neurology as an outpatient. Physical and occupational therapy evaluated you here with recommendation for transitional care unit.     Activity    Your activity upon discharge: activity as tolerated     Diet    Follow this diet upon discharge: Current Diet:Orders Placed This Encounter      Calorie Counts      Combination Diet Regular Diet Adult       Significant Results and Procedures   Most Recent 3 CBC's:  Recent Labs   Lab Test 05/22/25  0902 05/21/25  0700 05/20/25  1835   WBC 6.0 6.4 8.5   HGB 12.6 12.7 13.1   MCV 80 78 79    96* 170     Most Recent 3 BMP's:  Recent Labs   Lab Test 05/22/25  0902 05/21/25  0700 05/20/25  1835    138 141   POTASSIUM 3.9 3.8 3.7   CHLORIDE 106 108* 107   CO2 20* 17* 18*   BUN 17.6 13.8 18.9   CR 0.97* 0.80 0.84   ANIONGAP 14 13 16*   DONNA 9.1 8.8 9.3   GLC 99 101* 81   ,   Results for orders placed or performed during the hospital  encounter of 05/20/25   CT Head w/o Contrast    Narrative    EXAM: CT CERVICAL SPINE W/O CONTRAST, CT HEAD W/O CONTRAST  LOCATION: [facility]  DATE: 5/20/2025    INDICATION: Fall  COMPARISON:  April 23, 2025.  TECHNIQUE:   1) Routine CT Head without IV contrast. Multiplanar reformats. Dose reduction techniques were used.  2) Routine CT Cervical Spine without IV contrast. Multiplanar reformats. Dose reduction techniques were used.    FINDINGS:   HEAD CT:   INTRACRANIAL CONTENTS: No intracranial hemorrhage, extraaxial collection, or mass effect.  No CT evidence of acute infarct. Severe presumed chronic small vessel ischemic changes. Ventriculomegaly disproportionate to the degree of volume loss. Correlate   for normal pressure hydrocephalus.     VISUALIZED ORBITS/SINUSES/MASTOIDS: No intraorbital abnormality. No significant paranasal sinus mucosal disease. No middle ear or mastoid effusion.    BONES/SOFT TISSUES: No acute abnormality.    CERVICAL SPINE CT:   VERTEBRA: Straightening of the usual cervical lordosis. No acute compression fracture or posttraumatic subluxation.     CANAL/FORAMINA: Multilevel spondylosis without high grade canal stenosis.    PARASPINAL: No prevertebral edema.      Impression    IMPRESSION:  HEAD CT:  1.  No acute intracranial process.    CERVICAL SPINE CT:  1.  No CT evidence for acute fracture or post traumatic subluxation.   CT Cervical Spine w/o Contrast    Narrative    EXAM: CT CERVICAL SPINE W/O CONTRAST, CT HEAD W/O CONTRAST  LOCATION: [facility]  DATE: 5/20/2025    INDICATION: Fall  COMPARISON:  April 23, 2025.  TECHNIQUE:   1) Routine CT Head without IV contrast. Multiplanar reformats. Dose reduction techniques were used.  2) Routine CT Cervical Spine without IV contrast. Multiplanar reformats. Dose reduction techniques were used.    FINDINGS:   HEAD CT:   INTRACRANIAL CONTENTS: No  intracranial hemorrhage, extraaxial collection, or mass effect.  No CT evidence of acute infarct. Severe presumed chronic small vessel ischemic changes. Ventriculomegaly disproportionate to the degree of volume loss. Correlate   for normal pressure hydrocephalus.     VISUALIZED ORBITS/SINUSES/MASTOIDS: No intraorbital abnormality. No significant paranasal sinus mucosal disease. No middle ear or mastoid effusion.    BONES/SOFT TISSUES: No acute abnormality.    CERVICAL SPINE CT:   VERTEBRA: Straightening of the usual cervical lordosis. No acute compression fracture or posttraumatic subluxation.     CANAL/FORAMINA: Multilevel spondylosis without high grade canal stenosis.    PARASPINAL: No prevertebral edema.      Impression    IMPRESSION:  HEAD CT:  1.  No acute intracranial process.    CERVICAL SPINE CT:  1.  No CT evidence for acute fracture or post traumatic subluxation.       Discharge Medications   Current Discharge Medication List        CONTINUE these medications which have NOT CHANGED    Details   aspirin (ASA) 81 MG EC tablet Take 1 tablet (81 mg) by mouth daily.  Qty: 60 tablet, Refills: 2    Associated Diagnoses: Cerebrovascular accident (CVA) due to embolism of other precerebral artery (H)      atorvastatin (LIPITOR) 40 MG tablet Take 1 tablet (40 mg) by mouth every evening.  Qty: 60 tablet, Refills: 2    Associated Diagnoses: Cerebrovascular accident (CVA) due to embolism of other precerebral artery (H)      brimonidine (ALPHAGAN) 0.2 % ophthalmic solution Place 1 drop into the right eye 3 times daily.  Qty: 15 mL, Refills: 2    Associated Diagnoses: Chronic primary angle-closure glaucoma of right eye, severe stage      dorzolamide-timolol (COSOPT) 2-0.5 % ophthalmic solution Place 1 drop into both eyes 2 times daily.  Qty: 10 mL, Refills: 2    Associated Diagnoses: Other glaucoma of right eye      latanoprost (XALATAN) 0.005 % ophthalmic solution Place 1 drop into the right eye at bedtime.  Qty: 5 mL,  Refills: 2    Associated Diagnoses: Chronic primary angle-closure glaucoma of right eye, severe stage      losartan (COZAAR) 25 MG tablet Take 0.5 tablets (12.5 mg) by mouth daily.  Qty: 50 tablet, Refills: 2    Associated Diagnoses: Hypertension, unspecified type           Allergies   Allergies   Allergen Reactions    Penicillins      Swelling of arms and legs

## 2025-05-28 ENCOUNTER — TRANSFERRED RECORDS (OUTPATIENT)
Dept: HEALTH INFORMATION MANAGEMENT | Facility: CLINIC | Age: 75
End: 2025-05-28
Payer: COMMERCIAL

## 2025-06-12 ENCOUNTER — OFFICE VISIT (OUTPATIENT)
Dept: FAMILY MEDICINE | Facility: CLINIC | Age: 75
End: 2025-06-12
Payer: COMMERCIAL

## 2025-06-12 VITALS
BODY MASS INDEX: 25.46 KG/M2 | OXYGEN SATURATION: 100 % | HEART RATE: 96 BPM | DIASTOLIC BLOOD PRESSURE: 87 MMHG | WEIGHT: 162.2 LBS | HEIGHT: 67 IN | TEMPERATURE: 97.1 F | RESPIRATION RATE: 18 BRPM | SYSTOLIC BLOOD PRESSURE: 131 MMHG

## 2025-06-12 DIAGNOSIS — R62.7 ADULT FAILURE TO THRIVE: ICD-10-CM

## 2025-06-12 DIAGNOSIS — I10 HYPERTENSION, UNSPECIFIED TYPE: ICD-10-CM

## 2025-06-12 DIAGNOSIS — H40.033 ANATOMICAL NARROW ANGLE BORDERLINE GLAUCOMA OF BOTH EYES: ICD-10-CM

## 2025-06-12 DIAGNOSIS — G91.2 (IDIOPATHIC) NORMAL PRESSURE HYDROCEPHALUS (H): ICD-10-CM

## 2025-06-12 DIAGNOSIS — R29.6 RECURRENT FALLS: Primary | ICD-10-CM

## 2025-06-12 DIAGNOSIS — Z86.73 HISTORY OF CVA (CEREBROVASCULAR ACCIDENT): ICD-10-CM

## 2025-06-12 PROCEDURE — 99214 OFFICE O/P EST MOD 30 MIN: CPT | Performed by: PHYSICIAN ASSISTANT

## 2025-06-12 PROCEDURE — 3079F DIAST BP 80-89 MM HG: CPT | Performed by: PHYSICIAN ASSISTANT

## 2025-06-12 PROCEDURE — 1126F AMNT PAIN NOTED NONE PRSNT: CPT | Performed by: PHYSICIAN ASSISTANT

## 2025-06-12 PROCEDURE — 3075F SYST BP GE 130 - 139MM HG: CPT | Performed by: PHYSICIAN ASSISTANT

## 2025-06-12 RX ORDER — ATORVASTATIN CALCIUM 40 MG/1
40 TABLET, FILM COATED ORAL EVERY EVENING
Qty: 60 TABLET | Refills: 2 | Status: SHIPPED | OUTPATIENT
Start: 2025-06-12

## 2025-06-12 RX ORDER — LOSARTAN POTASSIUM 25 MG/1
12.5 TABLET ORAL DAILY
Qty: 50 TABLET | Refills: 2 | Status: SHIPPED | OUTPATIENT
Start: 2025-06-12

## 2025-06-12 ASSESSMENT — PAIN SCALES - GENERAL: PAINLEVEL_OUTOF10: NO PAIN (0)

## 2025-06-12 NOTE — PROGRESS NOTES
"  Assessment & Plan     Recurrent falls  Was admitted for this and work up felt this was multifactorial.  She has follow up with neurology.  Currently at TCU    History of CVA (cerebrovascular accident)  Stable.    Adult failure to thrive  Continues PHYSICAL THERAPY/OT at TCU    Anatomical narrow angle borderline glaucoma of both eyes  Follows with ophth    (Idiopathic) normal pressure hydrocephalus (H)      Hypertension, unspecified type  At goal  - losartan (COZAAR) 25 MG tablet; Take 0.5 tablets (12.5 mg) by mouth daily.        MED REC REQUIRED  Post Medication Reconciliation Status:  Discharge medications reconciled, continue medications without change  BMI  Estimated body mass index is 25.79 kg/m  as calculated from the following:    Height as of this encounter: 1.689 m (5' 6.5\").    Weight as of this encounter: 73.6 kg (162 lb 3.2 oz).             Aakash Orantes is a 74 year old, presenting for the following health issues:  Hospital F/U        6/12/2025     2:10 PM   Additional Questions   Roomed by Eula   Accompanied by Son     History of Present Illness       Reason for visit:  Eye  Symptom onset:  More than a month  Symptoms include:  Running eyes  Symptom intensity:  Mild  Symptom progression:  Staying the same  Had these symptoms before:  No  What makes it worse:  No  What makes it better:  No She is missing 7 dose(s) of medications per week.            Hospital Follow-up Visit:    Hospital/Nursing Home/IP Rehab Facility: [facility]  Most Recent Admission Date: 5/20/2025   Most Recent Admission Diagnosis: Failure to thrive in adult - R62.7  Recurrent falls - R29.6     Most Recent Discharge Date: 5/25/2025   Most Recent Discharge Diagnosis: Failure to thrive in adult - R62.7  Recurrent falls - R29.6  Generalized muscle weakness - M62.81  Delirium - R41.0  History of CVA (cerebrovascular accident) - Z86.73   Do you have any other stressors you would " "like to discuss with your provider? No    Problems taking medications regularly:  None  Medication changes since discharge: None  Problems adhering to non-medication therapy:  None    Summary of hospitalization:  [facility] hospital discharge summary reviewed  Diagnostic Tests/Treatments reviewed.  Follow up needed: none  Other Healthcare Providers Involved in Patient s Care:         Specialist appointment - neurology and neuropsych evaluation  Update since discharge: improved.         Plan of care communicated with patient and family                   Review of Systems  Constitutional, HEENT, cardiovascular, pulmonary, gi and gu systems are negative, except as otherwise noted.      Objective    /87 (BP Location: Left arm, Patient Position: Sitting, Cuff Size: Adult Regular)   Pulse 96   Temp 97.1  F (36.2  C) (Skin)   Resp 18   Ht 1.689 m (5' 6.5\")   Wt 73.6 kg (162 lb 3.2 oz)   LMP  (LMP Unknown)   SpO2 100%   Breastfeeding No   BMI 25.79 kg/m    Body mass index is 25.79 kg/m .  Physical Exam   GENERAL: alert and no distress  EYES: Eyes grossly normal to inspection  RESP: lungs clear to auscultation - no rales, rhonchi or wheezes  CV: regular rate and rhythm, normal S1 S2, no S3 or S4, no murmur, click or rub, no peripheral edema             Signed Electronically by: Pedro Wang PA-C    "

## 2025-06-19 PROCEDURE — G0180 MD CERTIFICATION HHA PATIENT: HCPCS | Performed by: PHYSICIAN ASSISTANT

## 2025-06-25 DIAGNOSIS — Z53.9 DIAGNOSIS NOT YET DEFINED: Primary | ICD-10-CM

## 2025-08-13 ENCOUNTER — TELEPHONE (OUTPATIENT)
Dept: FAMILY MEDICINE | Facility: CLINIC | Age: 75
End: 2025-08-13
Payer: COMMERCIAL

## 2025-08-21 DIAGNOSIS — Z53.9 DIAGNOSIS NOT YET DEFINED: Primary | ICD-10-CM

## 2025-08-24 ENCOUNTER — HOSPITAL ENCOUNTER (OUTPATIENT)
Facility: CLINIC | Age: 75
Setting detail: OBSERVATION
Discharge: NURSING, REHAB OR LONG-TERM CARE | End: 2025-08-26
Attending: EMERGENCY MEDICINE | Admitting: HOSPITALIST
Payer: COMMERCIAL

## 2025-08-24 ENCOUNTER — RESULTS ONLY (OUTPATIENT)
Facility: CLINIC | Age: 75
End: 2025-08-24

## 2025-08-24 ENCOUNTER — APPOINTMENT (OUTPATIENT)
Dept: CT IMAGING | Facility: CLINIC | Age: 75
End: 2025-08-24
Attending: STUDENT IN AN ORGANIZED HEALTH CARE EDUCATION/TRAINING PROGRAM
Payer: COMMERCIAL
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
## 2025-08-24 ASSESSMENT — ACTIVITIES OF DAILY LIVING (ADL)
ADLS_ACUITY_SCORE: 57

## 2025-08-25 ENCOUNTER — APPOINTMENT (OUTPATIENT)
Dept: PHYSICAL THERAPY | Facility: CLINIC | Age: 75
End: 2025-08-25
Attending: PHYSICIAN ASSISTANT
Payer: COMMERCIAL

## 2025-08-25 ENCOUNTER — APPOINTMENT (OUTPATIENT)
Dept: OCCUPATIONAL THERAPY | Facility: CLINIC | Age: 75
End: 2025-08-25
Attending: PHYSICIAN ASSISTANT
Payer: COMMERCIAL

## 2025-08-25 ASSESSMENT — ACTIVITIES OF DAILY LIVING (ADL)
ADLS_ACUITY_SCORE: 42
ADLS_ACUITY_SCORE: 47
ADLS_ACUITY_SCORE: 42
ADLS_ACUITY_SCORE: 42
ADLS_ACUITY_SCORE: 47
ADLS_ACUITY_SCORE: 42
ADLS_ACUITY_SCORE: 44
ADLS_ACUITY_SCORE: 47
ADLS_ACUITY_SCORE: 42
ADLS_ACUITY_SCORE: 48
ADLS_ACUITY_SCORE: 44
ADLS_ACUITY_SCORE: 42
ADLS_ACUITY_SCORE: 44
ADLS_ACUITY_SCORE: 42
DEPENDENT_IADLS:: LAUNDRY;CLEANING
ADLS_ACUITY_SCORE: 42
ADLS_ACUITY_SCORE: 42
ADLS_ACUITY_SCORE: 47
ADLS_ACUITY_SCORE: 44
ADLS_ACUITY_SCORE: 44
ADLS_ACUITY_SCORE: 47
ADLS_ACUITY_SCORE: 42
ADLS_ACUITY_SCORE: 44
ADLS_ACUITY_SCORE: 44

## 2025-08-26 ENCOUNTER — APPOINTMENT (OUTPATIENT)
Dept: PHYSICAL THERAPY | Facility: CLINIC | Age: 75
End: 2025-08-26
Payer: COMMERCIAL

## 2025-08-26 ASSESSMENT — ACTIVITIES OF DAILY LIVING (ADL)
ADLS_ACUITY_SCORE: 50
ADLS_ACUITY_SCORE: 48
ADLS_ACUITY_SCORE: 50
ADLS_ACUITY_SCORE: 48
ADLS_ACUITY_SCORE: 48
ADLS_ACUITY_SCORE: 50
ADLS_ACUITY_SCORE: 50
ADLS_ACUITY_SCORE: 48
ADLS_ACUITY_SCORE: 50
ADLS_ACUITY_SCORE: 48
ADLS_ACUITY_SCORE: 50
ADLS_ACUITY_SCORE: 48

## 2025-08-27 ENCOUNTER — TELEPHONE (OUTPATIENT)
Dept: FAMILY MEDICINE | Facility: CLINIC | Age: 75
End: 2025-08-27
Payer: COMMERCIAL

## 2025-08-28 ENCOUNTER — MEDICAL CORRESPONDENCE (OUTPATIENT)
Dept: HEALTH INFORMATION MANAGEMENT | Facility: CLINIC | Age: 75
End: 2025-08-28
Payer: COMMERCIAL

## 2025-08-28 ENCOUNTER — PATIENT OUTREACH (OUTPATIENT)
Dept: CARE COORDINATION | Facility: CLINIC | Age: 75
End: 2025-08-28
Payer: COMMERCIAL

## (undated) DEVICE — EYE SHIELD PLASTIC

## (undated) DEVICE — CLIP HEMOCLIP ENDOSCOPIC INSTINCT 2.8X230CM INSC-7-230-SS

## (undated) DEVICE — CATARACT BLADE PACK 2.6MM 58001899

## (undated) DEVICE — LINEN TOWEL PACK X5 5464

## (undated) DEVICE — ENDO TRAP POLYP QUICK CATCH 710201

## (undated) DEVICE — EYE TIP IRRIGATION & ASPIRATION POLYMER CVD 0.3MM 8065751512

## (undated) DEVICE — GLOVE BIOGEL PI MICRO SZ 7.5 48575

## (undated) DEVICE — ENDO SNARE POLYPECTOMY OVAL 15MM LOOP SD-240U-15

## (undated) DEVICE — ESU GROUND PAD ADULT W/CORD E7507

## (undated) DEVICE — KIT ENDO TURNOVER/PROCEDURE W/CLEAN A SCOPE LINERS 103888

## (undated) DEVICE — EYE PACK CUSTOM ANTERIOR 30DEG TIP CENTURION PPK6682-04

## (undated) DEVICE — PACK CATARACT CUSTOM ASC SEY15CPUMC

## (undated) DEVICE — Device

## (undated) DEVICE — SOL WATER IRRIG 500ML BOTTLE 2F7113

## (undated) RX ORDER — PROPOFOL 10 MG/ML
INJECTION, EMULSION INTRAVENOUS
Status: DISPENSED
Start: 2024-12-23

## (undated) RX ORDER — ONDANSETRON 2 MG/ML
INJECTION INTRAMUSCULAR; INTRAVENOUS
Status: DISPENSED
Start: 2024-12-23

## (undated) RX ORDER — FENTANYL CITRATE 50 UG/ML
INJECTION, SOLUTION INTRAMUSCULAR; INTRAVENOUS
Status: DISPENSED
Start: 2024-12-23

## (undated) RX ORDER — FENTANYL CITRATE 50 UG/ML
INJECTION, SOLUTION INTRAMUSCULAR; INTRAVENOUS
Status: DISPENSED
Start: 2021-08-30

## (undated) RX ORDER — DEXAMETHASONE SODIUM PHOSPHATE 4 MG/ML
INJECTION, SOLUTION INTRA-ARTICULAR; INTRALESIONAL; INTRAMUSCULAR; INTRAVENOUS; SOFT TISSUE
Status: DISPENSED
Start: 2024-12-23

## (undated) RX ORDER — SIMETHICONE 40MG/0.6ML
SUSPENSION, DROPS(FINAL DOSAGE FORM)(ML) ORAL
Status: DISPENSED
Start: 2021-08-30

## (undated) RX ORDER — ACETAMINOPHEN 325 MG/1
TABLET ORAL
Status: DISPENSED
Start: 2024-12-23